# Patient Record
Sex: FEMALE | Race: WHITE | Employment: FULL TIME | URBAN - NONMETROPOLITAN AREA
[De-identification: names, ages, dates, MRNs, and addresses within clinical notes are randomized per-mention and may not be internally consistent; named-entity substitution may affect disease eponyms.]

---

## 2020-03-10 ENCOUNTER — HOSPITAL ENCOUNTER (OUTPATIENT)
Age: 25
Discharge: HOME | End: 2020-03-10
Payer: COMMERCIAL

## 2020-03-10 ENCOUNTER — HOSPITAL ENCOUNTER (OUTPATIENT)
Age: 25
Setting detail: SPECIMEN
Discharge: HOME | End: 2020-03-10
Payer: COMMERCIAL

## 2020-03-10 ENCOUNTER — INITIAL PRENATAL (OUTPATIENT)
Dept: OBGYN | Age: 25
End: 2020-03-10
Payer: COMMERCIAL

## 2020-03-10 VITALS
BODY MASS INDEX: 27.23 KG/M2 | HEIGHT: 62 IN | WEIGHT: 148 LBS | HEART RATE: 80 BPM | DIASTOLIC BLOOD PRESSURE: 64 MMHG | SYSTOLIC BLOOD PRESSURE: 110 MMHG

## 2020-03-10 LAB
ABO/RH: NORMAL
AMPHETAMINE SCREEN URINE: NEGATIVE
ANTIBODY SCREEN: NEGATIVE
BARBITURATE SCREEN URINE: NEGATIVE
BENZODIAZEPINE SCREEN, URINE: NEGATIVE
BUPRENORPHINE URINE: NEGATIVE
CANNABINOID SCREEN URINE: NEGATIVE
COCAINE METABOLITE, URINE: NEGATIVE
CONTROL: PRESENT
MDMA URINE: NORMAL
METHADONE SCREEN, URINE: NEGATIVE
METHAMPHETAMINE, URINE: NEGATIVE
OPIATES, URINE: NEGATIVE
OXYCODONE SCREEN URINE: NEGATIVE
PHENCYCLIDINE, URINE: NEGATIVE
PREGNANCY TEST URINE, POC: POSITIVE
PROPOXYPHENE, URINE: NEGATIVE
TEST INFORMATION: NORMAL
TRICYCLIC ANTIDEPRESSANTS, UR: NEGATIVE

## 2020-03-10 PROCEDURE — 87340 HEPATITIS B SURFACE AG IA: CPT

## 2020-03-10 PROCEDURE — 87389 HIV-1 AG W/HIV-1&-2 AB AG IA: CPT

## 2020-03-10 PROCEDURE — 80306 DRUG TEST PRSMV INSTRMNT: CPT

## 2020-03-10 PROCEDURE — 0500F INITIAL PRENATAL CARE VISIT: CPT | Performed by: ADVANCED PRACTICE MIDWIFE

## 2020-03-10 PROCEDURE — G8427 DOCREV CUR MEDS BY ELIG CLIN: HCPCS | Performed by: ADVANCED PRACTICE MIDWIFE

## 2020-03-10 PROCEDURE — G8419 CALC BMI OUT NRM PARAM NOF/U: HCPCS | Performed by: ADVANCED PRACTICE MIDWIFE

## 2020-03-10 PROCEDURE — 86900 BLOOD TYPING SEROLOGIC ABO: CPT

## 2020-03-10 PROCEDURE — 86780 TREPONEMA PALLIDUM: CPT

## 2020-03-10 PROCEDURE — 86901 BLOOD TYPING SEROLOGIC RH(D): CPT

## 2020-03-10 PROCEDURE — 86850 RBC ANTIBODY SCREEN: CPT

## 2020-03-10 PROCEDURE — 87591 N.GONORRHOEAE DNA AMP PROB: CPT

## 2020-03-10 PROCEDURE — 36415 COLL VENOUS BLD VENIPUNCTURE: CPT

## 2020-03-10 PROCEDURE — 87491 CHLMYD TRACH DNA AMP PROBE: CPT

## 2020-03-10 PROCEDURE — 85025 COMPLETE CBC W/AUTO DIFF WBC: CPT

## 2020-03-10 PROCEDURE — 86762 RUBELLA ANTIBODY: CPT

## 2020-03-10 PROCEDURE — 86803 HEPATITIS C AB TEST: CPT

## 2020-03-10 PROCEDURE — 87086 URINE CULTURE/COLONY COUNT: CPT

## 2020-03-10 RX ORDER — MONTELUKAST SODIUM 10 MG/1
TABLET ORAL
COMMUNITY
Start: 2020-01-15 | End: 2021-05-03 | Stop reason: SDUPTHER

## 2020-03-10 SDOH — HEALTH STABILITY: MENTAL HEALTH: HOW OFTEN DO YOU HAVE A DRINK CONTAINING ALCOHOL?: NEVER

## 2020-03-10 ASSESSMENT — PATIENT HEALTH QUESTIONNAIRE - PHQ9
SUM OF ALL RESPONSES TO PHQ QUESTIONS 1-9: 0
1. LITTLE INTEREST OR PLEASURE IN DOING THINGS: 0
2. FEELING DOWN, DEPRESSED OR HOPELESS: 0
SUM OF ALL RESPONSES TO PHQ QUESTIONS 1-9: 0
SUM OF ALL RESPONSES TO PHQ9 QUESTIONS 1 & 2: 0

## 2020-03-10 NOTE — PATIENT INSTRUCTIONS
of the medicines you take. Where can you learn more? Go to [email or web address] and sign in to your NewACT account. Enter Z772 in the SaveMeeting box to learn more about \"Learning About Pregnancy. \"     If you do not have an account, please click on the \"Sign Up Now\" link. Current as of: May 29, 2019  Content Version: 12.3  © 5502-3256 [employer]. Care instructions adapted under license by [facility] ([facility]). If you have questions about a medical condition or this instruction, always ask your healthcare professional. [address] any warranty or liability for your use of this information. Patient Education        Nutrition During Pregnancy: Care Instructions  Your Care Instructions    Healthy eating when you are pregnant is important for you and your baby. It can help you feel well and have a successful pregnancy and delivery. During pregnancy your nutrition needs increase. Even if you have excellent eating habits, your doctor may recommend a multivitamin to make sure you get enough iron and folic acid. Many pregnant women wonder how much weight they should gain. In general, women who were at a healthy weight before they became pregnant should gain between 25 and 35 pounds. Women who were overweight before pregnancy are usually advised to gain 15 to 25 pounds. Women who were underweight before pregnancy are usually advised to gain 28 to 40 pounds. Your doctor will work with you to set a weight goal that is right for you. Gaining a healthy amount of weight helps you have a healthy baby. Follow-up care is a key part of your treatment and safety. Be sure to make and go to all appointments, and call your doctor if you are having problems. It's also a good idea to know your test results and keep a list of the medicines you take. How can you care for yourself at home? · Eat plenty of fruits and vegetables.  Include a variety of orange, yellow, and leafy problems. Where can you learn more? Go to https://chpepiceweb.[email or web address] org and sign in to your [employer] account. Enter Y785 in the 2080 Media box to learn more about \"Nutrition During Pregnancy: Care Instructions. \"     If you do not have an account, please click on the \"Sign Up Now\" link. Current as of: May 29, 2019  Content Version: 12.3  © 9558-0181 [employer]. Care instructions adapted under license by [facility] ([facility]). If you have questions about a medical condition or this instruction, always ask your healthcare professional. [place] [address] any warranty or liability for your use of this information. Patient Education        Pregnancy Precautions: Care Instructions  Your Care Instructions    There is no sure way to prevent labor before your due date ( labor) or to prevent most other pregnancy problems. But there are things you can do to increase your chances of a healthy pregnancy. Go to your appointments, follow your doctor's advice, and take good care of yourself. Eat well, and exercise (if your doctor agrees). And make sure to drink plenty of water. Follow-up care is a key part of your treatment and safety. Be sure to make and go to all appointments, and call your doctor if you are having problems. It's also a good idea to know your test results and keep a list of the medicines you take. How can you care for yourself at home? · Make sure you go to your prenatal appointments. At each visit, your doctor will check your blood pressure. Your doctor will also check to see if you have protein in your urine. High blood pressure and protein in urine are signs of preeclampsia. This condition can be dangerous for you and your baby. · Drink plenty of fluids, enough so that your urine is light yellow or clear like water. Dehydration can cause contractions.  If you have kidney, heart, or liver disease and have to limit fluids, talk with your doctor before for you. During the first 6 to 10 weeks of your pregnancy, your body goes through many changes. Your baby grows very fast, even though you cannot feel it yet. You may start to notice that you feel different, both in your body and your emotions. Because each woman's pregnancy is unique, there is no right way to feel. You may feel the healthiest you have ever been, or you may feel tired or sick to your stomach (\"morning sickness\"). These early weeks are a time to make healthy choices and to eat the best foods for you and your baby. This care sheet will give you some ideas. This is also a good time to think about birth defects testing. These are tests done during pregnancy to look for possible problems with the baby. First trimester tests for birth defects can be done between 8 and 17 weeks of pregnancy, depending on the test. Talk with your doctor about what kinds of tests are available. Follow-up care is a key part of your treatment and safety. Be sure to make and go to all appointments, and call your doctor if you are having problems. It's also a good idea to know your test results and keep a list of the medicines you take. How can you care for yourself at home? Eat well  · Eat at least 3 meals and 2 healthy snacks every day. Eat fresh, whole foods, including:  ? 7 or more servings of bread, tortillas, cereal, rice, pasta, or oatmeal.  ? 3 or more servings of vegetables, especially leafy green vegetables. ? 2 or more servings of fruits. ? 3 or more servings of milk, yogurt, or cheese. ? 2 or more servings of meat, turkey, chicken, fish, eggs, or dried beans. · Drink plenty of fluids, especially water. Avoid sodas and other sweetened drinks. · Choose foods that have important vitamins for your baby, such as calcium, iron, and folate. ? Dairy products, tofu, canned fish with bones, almonds, broccoli, dark leafy greens, corn tortillas, and fortified orange juice are good sources of calcium. ?  Beef, poultry, liver, spinach, lentils, dried beans, fortified cereals, and dried fruits are rich in iron. ? Dark leafy greens, broccoli, asparagus, liver, fortified cereals, orange juice, peanuts, and almonds are good sources of folate. · Avoid foods that could harm your baby. ? Do not eat raw or undercooked meat, chicken, or fish (such as sushi or raw oysters). ? Do not eat raw eggs or foods that contain raw eggs, such as Caesar dressing. ? Do not eat soft cheeses and unpasteurized dairy foods, such as Brie, feta, or blue cheese. ? Do not eat fish that contains a lot of mercury, such as shark, swordfish, tilefish, or kev mackerel. Do not eat more than 6 ounces of tuna each week. ? Do not eat raw sprouts, especially alfalfa sprouts. ? Cut down on caffeine, such as coffee, tea, and cola. Protect yourself and your baby  · Do not touch lidia litter or cat feces. They can cause an infection that could harm your baby. · High body temperature can be harmful to your baby. So if you want to use a sauna or hot tub, be sure to talk to your doctor about how to use it safely. [place] with morning sickness  · Sip small amounts of water, juices, or shakes. Try drinking between meals, not with meals. · Eat 5 or 6 small meals a day. Try dry toast or crackers when you first get up, and eat breakfast a little later. · Avoid spicy, greasy, and fatty foods. · When you feel sick, open your windows or go for a short walk to get fresh air. · Try nausea wristbands. These help some women. · Tell your doctor if you think your prenatal vitamins make you sick. Where can you learn more? Go to https://Capital Financial GlobalpeAgendaeb.healthTHE BEARDED LADY. org and sign in to your theBench account. Enter G112 in the Energate box to learn more about \"Weeks 6 to 10 of Your Pregnancy: Care Instructions. \"     If you do not have an account, please click on the \"Sign Up Now\" link.   Current as of: May 29, 2019  Content Version: 12.3  © 7680-9205 Healthwise, Incorporated. Care instructions adapted under license by [facility] ([facility]). If you have questions about a medical condition or this instruction, always ask your healthcare professional. [address] any warranty or liability for your use of this information. Patient Education        Learning About When to Call Your Doctor During Pregnancy (Up to 20 Weeks)  Your Care Instructions    It's common to have concerns about what might be a problem during pregnancy. Although most pregnant women don't have any serious problems, it's important to know when to call your doctor if you have certain symptoms. These are general suggestions. Your doctor may give you some more information about when to call. When to call your doctor (up to 20 weeks)  Call 911 anytime you think you may need emergency care. For example, call if:  · You passed out (lost consciousness). Call your doctor now or seek immediate medical care if:  · You have a fever. · You have vaginal bleeding. · You are dizzy or lightheaded, or you feel like you may faint. · You have symptoms of a urinary tract infection. These may include:  ? Pain or burning when you urinate. ? A frequent need to urinate without being able to pass much urine. ? Pain in the flank, which is just below the rib cage and above the waist on either side of the back. ? Blood in your urine. · You have belly pain. · You think you are having contractions. · You have a sudden release of fluid from your vagina. Watch closely for changes in your health, and be sure to contact your doctor if:  · You have vaginal discharge that smells bad. · You have other concerns about your pregnancy. Follow-up care is a key part of your treatment and safety. Be sure to make and go to all appointments, and call your doctor if you are having problems. It's also a good idea to know your test results and keep a list of the medicines you take. Where can you learn more?   Go to

## 2020-03-10 NOTE — PROGRESS NOTES
and your baby healthy. How can you care for yourself at home? Diet    · Eat a balanced diet. Make sure your diet includes plenty of beans, peas, and leafy green vegetables.     · Do not skip meals or go for many hours without eating. If you are nauseated, try to eat a small, healthy snack every 2 to 3 hours.     · Do not eat fish that has a high level of mercury, such as shark, swordfish, or mackerel. Do not eat more than one can of tuna each week.     · Drink plenty of fluids, enough so that your urine is light yellow or clear like water. If you have kidney, heart, or liver disease and have to limit fluids, talk with your doctor before you increase the amount of fluids you drink.     · Cut down on caffeine, such as coffee, tea, and cola.     · Do not drink alcohol, such as beer, wine, or hard liquor.     · Take a multivitamin that contains at least 400 micrograms (mcg) of folic acid to help prevent birth defects. Fortified cereal and whole wheat bread are good additional sources of folic acid.     · Increase the calcium in your diet. Try to drink a quart of skim milk each day. You may also take calcium supplements and choose foods such as cheese and yogurt.    Lifestyle    · Make sure you go to your follow-up appointments.     · Get plenty of rest. You may be unusually tired while you are pregnant.     · Get at least 30 minutes of exercise on most days of the week. Walking is a good choice. If you have not exercised in the past, start out slowly. Take several short walks each day.     · Do not smoke. If you need help quitting, talk to your doctor about stop-smoking programs. These can increase your chances of quitting for good.     · Do not touch cat feces or litter boxes. Also, wash your hands after you handle raw meat, and fully cook all meat before you eat it. Wear gloves when you work in the yard or garden, and wash your hands well when you are done.  Cat feces, raw or undercooked meat, and contaminated dirt vegetables. Include a variety of orange, yellow, and leafy dark-green vegetables every day. · Choose whole-grain bread, cereal, and pasta. Good choices include whole wheat bread, whole wheat pasta, brown rice, and oatmeal.  · Get 4 or more servings of milk and milk products each day. Good choices include nonfat or low-fat milk, yogurt, and cheese. If you cannot eat milk products, you can get calcium from calcium-fortified products such as orange juice, soy milk, and tofu. Other non-milk sources of calcium include leafy green vegetables, such as broccoli, kale, mustard greens, turnip greens, bok karla, and brussels sprouts. · If you eat meat, pick lower-fat types. Good choices include lean cuts of meat and chicken or turkey without the skin. · Do not eat shark, swordfish, kev mackerel, or tilefish. They have high levels of mercury, which is dangerous to your baby. You can eat up to 12 ounces a week of fish or shellfish that have low mercury levels. Good choices include shrimp, wild salmon, pollock, and catfish. Do not eat more than 6 ounces of tuna each week. · Heat lunch meats (such as turkey, ham, or bologna) to 165°F before you eat them. This reduces your risk of getting sick from a kind of bacteria that can be found in lunch meats. · Do not eat unpasteurized soft cheeses, such as brie, feta, fresh mozzarella, and blue cheese. They have a bacteria that could harm your baby. · Limit caffeine. If you drink coffee or tea, have no more than 1 cup a day. Caffeine is also found in kal. · Do not drink any alcohol. No amount of alcohol has been found to be safe during pregnancy. · Do not diet or try to lose weight. For example, do not follow a low-carbohydrate diet. If you are overweight at the start of your pregnancy, your doctor will work with you to manage your weight gain. · Tell your doctor about all vitamins and supplements you take. When should you call for help?   Watch closely for changes in your [employer]Friends, and be sure to contact your doctor if you have any problems. Where can you learn more? Go to https://chpepiceweb.healthSpace [email or web address] and sign in to your [employer] account. Enter Y785 in the Nextivity box to learn more about \"Nutrition During Pregnancy: Care Instructions. \"     If you do not have an account, please click on the \"Sign Up Now\" link. Current as of: May 29, 2019  Content Version: 12.3  © 9945-4421 [employer]. Care instructions adapted under license by [facility] ([facility]). If you have questions about a medical condition or this instruction, always ask your healthcare professional. [address] any warranty or liability for your use of this information. Patient Education        Pregnancy Precautions: Care Instructions  Your Care Instructions    There is no sure way to prevent labor before your due date ( labor) or to prevent most other pregnancy problems. But there are things you can do to increase your chances of a healthy pregnancy. Go to your appointments, follow your doctor's advice, and take good care of yourself. Eat well, and exercise (if your doctor agrees). And make sure to drink plenty of water. Follow-up care is a key part of your treatment and safety. Be sure to make and go to all appointments, and call your doctor if you are having problems. It's also a good idea to know your test results and keep a list of the medicines you take. How can you care for yourself at home? · Make sure you go to your prenatal appointments. At each visit, your doctor will check your blood pressure. Your doctor will also check to see if you have protein in your urine. High blood pressure and protein in urine are signs of preeclampsia. This condition can be dangerous for you and your baby. · Drink plenty of fluids, enough so that your urine is light yellow or clear like water. Dehydration can cause contractions.  If you have kidney, heart, or liver disease and have to limit fluids, talk with your doctor before you increase the amount of fluids you drink. · Tell your doctor right away if you notice any symptoms of an infection, such as:  ? Burning when you urinate. ? A foul-smelling discharge from your vagina. ? Vaginal itching. ? Unexplained fever. ? Unusual pain or soreness in your uterus or lower belly. · Eat a balanced diet. Include plenty of foods that are high in calcium and iron. ? Foods high in calcium include milk, cheese, yogurt, almonds, and broccoli. ? Foods high in iron include red meat, shellfish, poultry, eggs, beans, raisins, whole-grain bread, and leafy green vegetables. · Do not smoke. If you need help quitting, talk to your doctor about stop-smoking programs and medicines. These can increase your chances of quitting for good. · Do not drink alcohol or use illegal drugs. · Follow your doctor's directions about activity. Your doctor will let you know how much, if any, exercise you can do. · Ask your doctor if you can have sex. If you are at risk for early labor, your doctor may ask you to not have sex. · Take care to prevent falls. During pregnancy, your joints are loose, and your balance is off. Sports such as bicycling, skiing, or in-line skating can increase your risk of falling. And don't ride horses or motorcycles, dive, water ski, scuba dive, or parachute jump while you are pregnant. · Avoid getting very hot. Do not use saunas or hot tubs. Avoid staying out in the sun in hot weather for long periods. Take acetaminophen (Tylenol) to lower a high fever. · Do not take any over-the-counter or herbal medicines or supplements without talking to your doctor or pharmacist first.  When should you call for help? Call 911 anytime you think you may need emergency care.  For example, call if:    · You passed out (lost consciousness).     · You have a seizure.     · You have severe vaginal bleeding.     · You have severe pain in your belly or pelvis.     · You have had fluid gushing or leaking from your vagina and you know or think the umbilical cord is bulging into your vagina. If this happens, immediately get down on your knees so your rear end (buttocks) is higher than your head. This will decrease the pressure on the cord until help arrives.   [facility] your doctor now or seek immediate medical care if:    · You have signs of preeclampsia, such as:  ? Sudden swelling of your face, hands, or feet. ? New vision problems (such as dimness, blurring, or seeing spots). ? A severe headache.     · You have any vaginal bleeding.     · You have belly pain or cramping.     · You have a fever.     · You have had regular contractions (with or without pain) for an hour. This means that you have 8 or more within 1 hour or 4 or more in 20 minutes after you change your position and drink fluids.     · You have a sudden release of fluid from your vagina.     · You have low back pain or pelvic pressure that does not go away.     · You notice that your baby has stopped moving or is moving much less than normal.    Watch closely for changes in your health, and be sure to contact your doctor if you have any problems. Where can you learn more? Go to https://Tremor VideopepicRewardpodeb.healthRetrieve. org and sign in to your Xsigo account. Enter [id number] in the [facility] box to learn more about \"Pregnancy Precautions: Care Instructions. \"     If you do not have an account, please click on the \"Sign Up Now\" link. Current as of: May 29, 2019  Content Version: 12.3  © 7853-2149 Healthwise, Incorporated. Care instructions adapted under license by [facility] ([facility]). If you have questions about a medical condition or this instruction, always ask your healthcare professional. [place] [address] any warranty or liability for your use of this information.   Patient Education        Weeks 6 to 10 of Your Pregnancy: Care Instructions  Your Care Instructions    Congratulations on your pregnancy. This is an exciting and important time for you. During the first 6 to 10 weeks of your pregnancy, your body goes through many changes. Your baby grows very fast, even though you cannot feel it yet. You may start to notice that you feel different, both in your body and your emotions. Because each woman's pregnancy is unique, there is no right way to feel. You may feel the healthiest you have ever been, or you may feel tired or sick to your stomach (\"morning sickness\"). These early weeks are a time to make healthy choices and to eat the best foods for you and your baby. This care sheet will give you some ideas. This is also a good time to think about birth defects testing. These are tests done during pregnancy to look for possible problems with the baby. First trimester tests for birth defects can be done between 8 and 17 weeks of pregnancy, depending on the test. Talk with your doctor about what kinds of tests are available. Follow-up care is a key part of your treatment and safety. Be sure to make and go to all appointments, and call your doctor if you are having problems. It's also a good idea to know your test results and keep a list of the medicines you take. How can you care for yourself at home? Eat well  · Eat at least 3 meals and 2 healthy snacks every day. Eat fresh, whole foods, including:  ? 7 or more servings of bread, tortillas, cereal, rice, pasta, or oatmeal.  ? 3 or more servings of vegetables, especially leafy green vegetables. ? 2 or more servings of fruits. ? 3 or more servings of milk, yogurt, or cheese. ? 2 or more servings of meat, turkey, chicken, fish, eggs, or dried beans. · Drink plenty of fluids, especially water. Avoid sodas and other sweetened drinks. · Choose foods that have important vitamins for your baby, such as calcium, iron, and folate.   ? Dairy products, tofu, canned fish with bones, almonds, broccoli, dark leafy

## 2020-03-11 LAB — HEPATITIS C ANTIBODY: NONREACTIVE

## 2020-03-12 LAB
C. TRACHOMATIS DNA ,URINE: NEGATIVE
CULTURE: NO GROWTH
Lab: NORMAL
N. GONORRHOEAE DNA, URINE: NEGATIVE
SPECIMEN DESCRIPTION: NORMAL
SPECIMEN DESCRIPTION: NORMAL

## 2020-03-13 LAB
ABSOLUTE EOS #: 0.24 K/UL (ref 0–0.44)
ABSOLUTE IMMATURE GRANULOCYTE: 0.03 K/UL (ref 0–0.3)
ABSOLUTE LYMPH #: 1.84 K/UL (ref 1.1–3.7)
ABSOLUTE MONO #: 0.76 K/UL (ref 0.1–1.2)
BASOPHILS # BLD: 0 % (ref 0–2)
BASOPHILS ABSOLUTE: 0.04 K/UL (ref 0–0.2)
DIFFERENTIAL TYPE: ABNORMAL
EOSINOPHILS RELATIVE PERCENT: 3 % (ref 1–4)
HCT VFR BLD CALC: 36.8 % (ref 36.3–47.1)
HEMOGLOBIN: 11.8 G/DL (ref 11.9–15.1)
HEPATITIS B SURFACE ANTIGEN: NONREACTIVE
HIV AG/AB: NONREACTIVE
IMMATURE GRANULOCYTES: 0 %
LYMPHOCYTES # BLD: 19 % (ref 24–43)
MCH RBC QN AUTO: 31.7 PG (ref 25.2–33.5)
MCHC RBC AUTO-ENTMCNC: 32.1 G/DL (ref 28.4–34.8)
MCV RBC AUTO: 98.9 FL (ref 82.6–102.9)
MONOCYTES # BLD: 8 % (ref 3–12)
NRBC AUTOMATED: 0 PER 100 WBC
PDW BLD-RTO: 12.4 % (ref 11.8–14.4)
PLATELET # BLD: 290 K/UL (ref 138–453)
PLATELET ESTIMATE: ABNORMAL
PMV BLD AUTO: 8.7 FL (ref 8.1–13.5)
RBC # BLD: 3.72 M/UL (ref 3.95–5.11)
RBC # BLD: ABNORMAL 10*6/UL
RUBV IGG SER QL: 284.1 IU/ML
SEG NEUTROPHILS: 70 % (ref 36–65)
SEGMENTED NEUTROPHILS ABSOLUTE COUNT: 6.69 K/UL (ref 1.5–8.1)
T. PALLIDUM, IGG: NONREACTIVE
WBC # BLD: 9.6 K/UL (ref 3.5–11.3)
WBC # BLD: ABNORMAL 10*3/UL

## 2020-04-13 ENCOUNTER — ROUTINE PRENATAL (OUTPATIENT)
Dept: OBGYN | Age: 25
End: 2020-04-13
Payer: COMMERCIAL

## 2020-04-13 ENCOUNTER — HOSPITAL ENCOUNTER (OUTPATIENT)
Age: 25
Setting detail: SPECIMEN
Discharge: HOME | End: 2020-04-13
Payer: COMMERCIAL

## 2020-04-13 VITALS — BODY MASS INDEX: 26.85 KG/M2 | WEIGHT: 146.8 LBS | DIASTOLIC BLOOD PRESSURE: 74 MMHG | SYSTOLIC BLOOD PRESSURE: 112 MMHG

## 2020-04-13 PROCEDURE — G0145 SCR C/V CYTO,THINLAYER,RESCR: HCPCS

## 2020-04-13 PROCEDURE — 87624 HPV HI-RISK TYP POOLED RSLT: CPT

## 2020-04-13 PROCEDURE — 0502F SUBSEQUENT PRENATAL CARE: CPT | Performed by: ADVANCED PRACTICE MIDWIFE

## 2020-04-13 RX ORDER — OMEPRAZOLE 10 MG/1
10 CAPSULE, DELAYED RELEASE ORAL NIGHTLY
COMMUNITY
End: 2022-05-03 | Stop reason: ALTCHOICE

## 2020-04-13 NOTE — PROGRESS NOTES
Linwood Kaplan is here at 12w1d for:    Chief Complaint   Patient presents with    Routine Prenatal Visit     TBE. Last pap ? Unable to obtain urine. Estimated Due Date: Estimated Date of Delivery: 10/25/20    OB History    Para Term  AB Living   1             SAB TAB Ectopic Molar Multiple Live Births                    # Outcome Date GA Lbr Phillip/2nd Weight Sex Delivery Anes PTL Lv   1 Current                 Past Medical History:   Diagnosis Date    Asthma        No past surgical history on file. Social History     Tobacco Use   Smoking Status Former Smoker    Last attempt to quit: 3/3/2020    Years since quittin.1   Smokeless Tobacco Never Used        Social History     Substance and Sexual Activity   Alcohol Use Never    Frequency: Never       No results found for this visit on 20. Vitals:  /74   Wt 146 lb 12.8 oz (66.6 kg)   LMP 2019   BMI 26.85 kg/m²   Estimated body mass index is 26.85 kg/m² as calculated from the following:    Height as of 3/10/20: 5' 2\" (1.575 m). Weight as of this encounter: 146 lb 12.8 oz (66.6 kg). 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 See prenatal vital sign section and fetal assessment section    ASSESSMENT & Plan    Diagnosis Orders   1. Screening for cervical cancer  PAP SMEAR   2. Encounter for supervision of normal first pregnancy in first trimester     3. 12 weeks gestation of pregnancy     4. Persistent asthma without complication, unspecified asthma severity               I am having Aurelia Marilyn maintain her Advair Diskus, montelukast, ProAir HFA, omeprazole, and Prenatal MV-Min-Fe Fum-FA-DHA (PRENATAL 1 PO).     Return in about 4 weeks (around 5/11/2020) for ob. There are no Patient Instructions on file for this visit.           Fermin Sanchez,4/13/2020 4:56 PM

## 2020-04-14 LAB — CYTOLOGY REPORT: NORMAL

## 2020-04-17 LAB
HPV SAMPLE: ABNORMAL
HPV, GENOTYPE 16: NOT DETECTED
HPV, GENOTYPE 18: NOT DETECTED
HPV, HIGH RISK OTHER: DETECTED
HPV, INTERPRETATION: ABNORMAL
SPECIMEN DESCRIPTION: ABNORMAL

## 2020-04-21 RX ORDER — METRONIDAZOLE 250 MG/1
250 TABLET ORAL 3 TIMES DAILY
Qty: 21 TABLET | Refills: 0 | Status: SHIPPED | OUTPATIENT
Start: 2020-04-21 | End: 2020-04-28

## 2020-05-11 ENCOUNTER — TELEMEDICINE (OUTPATIENT)
Dept: OBGYN | Age: 25
End: 2020-05-11

## 2020-05-11 VITALS — WEIGHT: 144 LBS | BODY MASS INDEX: 26.34 KG/M2

## 2020-05-11 PROCEDURE — 0502F SUBSEQUENT PRENATAL CARE: CPT | Performed by: ADVANCED PRACTICE MIDWIFE

## 2020-05-11 NOTE — PROGRESS NOTES
Alan Leger is here at 16w1d for:    Chief Complaint   Patient presents with    Routine Prenatal Visit     C/O off and on vaginal pressure, no urinary symptoms or bleeding. No headaches, patient has not yet felt fetal movement. Estimated Due Date: Estimated Date of Delivery: 10/25/20    OB History    Para Term  AB Living   1             SAB TAB Ectopic Molar Multiple Live Births                    # Outcome Date GA Lbr Phillip/2nd Weight Sex Delivery Anes PTL Lv   1 Current                 Past Medical History:   Diagnosis Date    Asthma        No past surgical history on file. Social History     Tobacco Use   Smoking Status Former Smoker    Last attempt to quit: 3/3/2020    Years since quittin.1   Smokeless Tobacco Never Used        Social History     Substance and Sexual Activity   Alcohol Use Never    Frequency: Never       No results found for this visit on 20. Vitals: Wt 144 lb (65.3 kg)   LMP 2019   BMI 26.34 kg/m²   Estimated body mass index is 26.34 kg/m² as calculated from the following:    Height as of 3/10/20: 5' 2\" (1.575 m). Weight as of this encounter: 144 lb (65.3 kg). HPI: virtual prenatal visit 16 weeks     PT denies fever, chills, nausea and vomiting     16w1d    Are you having any shortness of breath? no    Are you noticing any fever? no    Do you have any abdominal pain? no    Do you have any pelvic pain? no    Do you have any dysuria? yes    Do you have any vaginal discharge? no    Do you have any swelling? no    Do you have any visual disturbances? no    Do you have any nausea or vomiting? no    Do you have any unusual headaches? no    Do you have any chest pain? no    Do you have any contractions? no    Do you have any other pain?  Bladder pressure      Objective:  No acute distress  Excellent communications  Well-nourished  Skin pink  Respirations appear unlabored    Results reviewed today:    No results found for this visit on

## 2020-05-12 ENCOUNTER — HOSPITAL ENCOUNTER (OUTPATIENT)
Age: 25
Discharge: HOME | End: 2020-05-12
Payer: COMMERCIAL

## 2020-05-12 LAB
-: ABNORMAL
AMORPHOUS: ABNORMAL
BACTERIA: ABNORMAL
BILIRUBIN URINE: NEGATIVE
CASTS UA: ABNORMAL /LPF
COLOR: YELLOW
COMMENT UA: ABNORMAL
CRYSTALS, UA: ABNORMAL /HPF
EPITHELIAL CELLS UA: ABNORMAL /HPF (ref 0–25)
GLUCOSE URINE: NEGATIVE
KETONES, URINE: NEGATIVE
LEUKOCYTE ESTERASE, URINE: NEGATIVE
MUCUS: ABNORMAL
NITRITE, URINE: NEGATIVE
OTHER OBSERVATIONS UA: ABNORMAL
PH UA: 7 (ref 5–9)
PROTEIN UA: NEGATIVE
RBC UA: ABNORMAL /HPF (ref 0–2)
RENAL EPITHELIAL, UA: ABNORMAL /HPF
SPECIFIC GRAVITY UA: 1.02 (ref 1.01–1.02)
TRICHOMONAS: ABNORMAL
TURBIDITY: CLEAR
URINE HGB: NEGATIVE
UROBILINOGEN, URINE: NORMAL
WBC UA: ABNORMAL /HPF (ref 0–5)
YEAST: ABNORMAL

## 2020-05-12 PROCEDURE — 81001 URINALYSIS AUTO W/SCOPE: CPT

## 2020-05-12 PROCEDURE — 87086 URINE CULTURE/COLONY COUNT: CPT

## 2020-05-13 LAB
CULTURE: NORMAL
Lab: NORMAL
SPECIMEN DESCRIPTION: NORMAL

## 2020-06-09 ENCOUNTER — ROUTINE PRENATAL (OUTPATIENT)
Dept: OBGYN | Age: 25
End: 2020-06-09
Payer: COMMERCIAL

## 2020-06-09 VITALS — DIASTOLIC BLOOD PRESSURE: 76 MMHG | SYSTOLIC BLOOD PRESSURE: 112 MMHG | BODY MASS INDEX: 27.07 KG/M2 | WEIGHT: 148 LBS

## 2020-06-09 PROCEDURE — 0502F SUBSEQUENT PRENATAL CARE: CPT | Performed by: ADVANCED PRACTICE MIDWIFE

## 2020-06-09 NOTE — PROGRESS NOTES
Nicole Hagan is here at 20w2d for:    Chief Complaint   Patient presents with    Routine Prenatal Visit     Follow up in house ultrasound. Estimated Due Date: Estimated Date of Delivery: 10/25/20    OB History    Para Term  AB Living   1             SAB TAB Ectopic Molar Multiple Live Births                    # Outcome Date GA Lbr Phillip/2nd Weight Sex Delivery Anes PTL Lv   1 Current                 Past Medical History:   Diagnosis Date    Asthma        No past surgical history on file. Social History     Tobacco Use   Smoking Status Former Smoker    Last attempt to quit: 3/3/2020    Years since quittin.2   Smokeless Tobacco Never Used        Social History     Substance and Sexual Activity   Alcohol Use Never    Frequency: Never       No results found for this visit on 20. Vitals:  /76   Wt 148 lb (67.1 kg)   LMP 2019   BMI 27.07 kg/m²   Estimated body mass index is 27.07 kg/m² as calculated from the following:    Height as of 3/10/20: 5' 2\" (1.575 m). Weight as of this encounter: 148 lb (67.1 kg). HPI: here for routine ob visit and anatomic screen WNL     PT denies fever, chills, nausea and vomiting       Abdomen: enlarged, gravid,soft, nontender     Results reviewed today:    No results found for this visit on 20. See prenatal vital sign section and fetal assessment section    ASSESSMENT & Plan    Diagnosis Orders   1. Encounter for supervision of normal first pregnancy in second trimester     2. 20 weeks gestation of pregnancy               I am having Nicole Hagan maintain her Advair Diskus, montelukast, ProAir HFA, omeprazole, and Prenatal MV-Min-Fe Fum-FA-DHA (PRENATAL 1 PO). Return in about 4 weeks (around 2020) for ob. There are no Patient Instructions on file for this visit.           Fermin Sanchez,2020 4:41 PM

## 2020-07-06 ENCOUNTER — ROUTINE PRENATAL (OUTPATIENT)
Dept: OBGYN | Age: 25
End: 2020-07-06
Payer: COMMERCIAL

## 2020-07-06 VITALS — WEIGHT: 152.6 LBS | SYSTOLIC BLOOD PRESSURE: 110 MMHG | DIASTOLIC BLOOD PRESSURE: 68 MMHG | BODY MASS INDEX: 27.91 KG/M2

## 2020-07-06 PROCEDURE — 0502F SUBSEQUENT PRENATAL CARE: CPT | Performed by: ADVANCED PRACTICE MIDWIFE

## 2020-07-06 NOTE — PROGRESS NOTES
Lucas Amaral is here at 24w1d for:    Chief Complaint   Patient presents with    Routine Prenatal Visit     Instructions for 1 hour gtt. next visit. Estimated Due Date: Estimated Date of Delivery: 10/25/20    OB History    Para Term  AB Living   1             SAB TAB Ectopic Molar Multiple Live Births                    # Outcome Date GA Lbr Phillip/2nd Weight Sex Delivery Anes PTL Lv   1 Current                 Past Medical History:   Diagnosis Date    Asthma        No past surgical history on file. Social History     Tobacco Use   Smoking Status Former Smoker    Last attempt to quit: 3/3/2020    Years since quittin.3   Smokeless Tobacco Never Used        Social History     Substance and Sexual Activity   Alcohol Use Never    Frequency: Never       No results found for this visit on 20. Vitals:  /68   Wt 152 lb 9.6 oz (69.2 kg)   LMP 2019   BMI 27.91 kg/m²   Estimated body mass index is 27.91 kg/m² as calculated from the following:    Height as of 3/10/20: 5' 2\" (1.575 m). Weight as of this encounter: 152 lb 9.6 oz (69.2 kg). HPI: here for routine ob visit, has decided she wants cell free dna, will do together with next blood draw at 28 weeks     PT denies fever, chills, nausea and vomiting       Abdomen: enlarged, gravid, soft, nontender     Results reviewed today:    No results found for this visit on 20. See prenatal vital sign section and fetal assessment section    ASSESSMENT & Plan    Diagnosis Orders   1. Encounter for supervision of normal first pregnancy in second trimester     2. 24 weeks gestation of pregnancy               I am having Lucas Garcia maintain her Advair Diskus, montelukast, ProAir HFA, omeprazole, and Prenatal MV-Min-Fe Fum-FA-DHA (PRENATAL 1 PO). Return in about 4 weeks (around 8/3/2020) for ob gtt and cell free dna. There are no Patient Instructions on file for this visit.           Arley Morocho Daniel,6/9/65 5:11 PM

## 2020-08-03 ENCOUNTER — ROUTINE PRENATAL (OUTPATIENT)
Dept: OBGYN | Age: 25
End: 2020-08-03
Payer: COMMERCIAL

## 2020-08-03 ENCOUNTER — HOSPITAL ENCOUNTER (OUTPATIENT)
Age: 25
Discharge: HOME | End: 2020-08-03
Payer: COMMERCIAL

## 2020-08-03 VITALS — DIASTOLIC BLOOD PRESSURE: 68 MMHG | WEIGHT: 159 LBS | SYSTOLIC BLOOD PRESSURE: 116 MMHG | BODY MASS INDEX: 29.08 KG/M2

## 2020-08-03 LAB
CHP ED QC CHECK: ABNORMAL
GLUCOSE BLD-MCNC: 154 MG/DL
HGB, POC: 12.5

## 2020-08-03 PROCEDURE — 36415 COLL VENOUS BLD VENIPUNCTURE: CPT

## 2020-08-03 PROCEDURE — 86850 RBC ANTIBODY SCREEN: CPT

## 2020-08-03 PROCEDURE — 86901 BLOOD TYPING SEROLOGIC RH(D): CPT

## 2020-08-03 PROCEDURE — 0502F SUBSEQUENT PRENATAL CARE: CPT | Performed by: ADVANCED PRACTICE MIDWIFE

## 2020-08-03 NOTE — PROGRESS NOTES
Pepe Bowman is here at 28w1d for:    No chief complaint on file. Estimated Due Date: Estimated Date of Delivery: 10/25/20    OB History    Para Term  AB Living   1             SAB TAB Ectopic Molar Multiple Live Births                    # Outcome Date GA Lbr Phillip/2nd Weight Sex Delivery Anes PTL Lv   1 Current                 Past Medical History:   Diagnosis Date    Asthma        No past surgical history on file. Social History     Tobacco Use   Smoking Status Former Smoker    Last attempt to quit: 3/3/2020    Years since quittin.4   Smokeless Tobacco Never Used        Social History     Substance and Sexual Activity   Alcohol Use Never    Frequency: Never       No results found for this visit on 20. Vitals:  /68   Wt 159 lb (72.1 kg)   LMP 2019   BMI 29.08 kg/m²   Estimated body mass index is 29.08 kg/m² as calculated from the following:    Height as of 3/10/20: 5' 2\" (1.575 m). Weight as of this encounter: 159 lb (72.1 kg). HPI: here for routine ob visit and gtt, denies c/o requests cell free dna     PT denies fever, chills, nausea and vomiting       Abdomen: enlarged, gravid, soft, nontender     Results reviewed today:    No results found for this visit on 20. See prenatal vital sign section and fetal assessment section    ASSESSMENT & Plan    Diagnosis Orders   1. Encounter for supervision of normal first pregnancy in third trimester     2. 28 weeks gestation of pregnancy     3. Encounter for other specified  screening  Prenatal Testing for Fetal Aneuploidy             I am having Pepe Bowman maintain her Advair Diskus, montelukast, ProAir HFA, omeprazole, and Prenatal MV-Min-Fe Fum-FA-DHA (PRENATAL 1 PO). Return in about 2 weeks (around 2020) for ob 30wkUS+tdap. There are no Patient Instructions on file for this visit.           Shereen Sanchez,8/3/2020 3:43 PM

## 2020-08-04 ENCOUNTER — HOSPITAL ENCOUNTER (OUTPATIENT)
Dept: INFUSION THERAPY | Age: 25
Discharge: HOME | End: 2020-08-04
Payer: COMMERCIAL

## 2020-08-04 VITALS
RESPIRATION RATE: 20 BRPM | HEART RATE: 85 BPM | SYSTOLIC BLOOD PRESSURE: 145 MMHG | DIASTOLIC BLOOD PRESSURE: 83 MMHG | TEMPERATURE: 97.7 F

## 2020-08-04 PROCEDURE — 6360000002 HC RX W HCPCS: Performed by: ADVANCED PRACTICE MIDWIFE

## 2020-08-04 PROCEDURE — 96372 THER/PROPH/DIAG INJ SC/IM: CPT

## 2020-08-04 RX ADMIN — HUMAN RHO(D) IMMUNE GLOBULIN 300 MCG: 300 INJECTION, SOLUTION INTRAMUSCULAR at 15:39

## 2020-08-05 ENCOUNTER — HOSPITAL ENCOUNTER (OUTPATIENT)
Dept: LAB | Age: 25
Discharge: HOME | End: 2020-08-05
Payer: COMMERCIAL

## 2020-08-05 LAB
ABO/RH: NORMAL
ANTIBODY SCREEN: NEGATIVE
BLD PROD TYP BPU: NORMAL
BLD PROD TYP BPU: NORMAL
DISPENSE STATUS BLOOD BANK: NORMAL
GLUCOSE BLD-MCNC: 85 MG/DL (ref 74–100)
HISTORY CHECK: NORMAL
Lab: 1
TRANSFUSION STATUS: NORMAL
UNIT DIVISION: 0
UNIT NUMBER: NORMAL

## 2020-08-05 PROCEDURE — 82952 GTT-ADDED SAMPLES: CPT

## 2020-08-05 PROCEDURE — 36415 COLL VENOUS BLD VENIPUNCTURE: CPT

## 2020-08-05 PROCEDURE — 82947 ASSAY GLUCOSE BLOOD QUANT: CPT

## 2020-08-05 PROCEDURE — 82951 GLUCOSE TOLERANCE TEST (GTT): CPT

## 2020-08-06 LAB
AMOUNT GLUCOSE GIVEN: 100 G
GLUCOSE FASTING: 85 MG/DL (ref 65–99)
GLUCOSE TOLERANCE TEST 1 HOUR: 181 MG/DL (ref 65–184)
GLUCOSE TOLERANCE TEST 2 HOUR: 147 MG/DL (ref 65–139)
GLUCOSE TOLERANCE TEST 3 HOUR: 136 MG/DL (ref 65–130)

## 2020-08-17 ENCOUNTER — ROUTINE PRENATAL (OUTPATIENT)
Dept: OBGYN | Age: 25
End: 2020-08-17
Payer: COMMERCIAL

## 2020-08-17 VITALS — SYSTOLIC BLOOD PRESSURE: 118 MMHG | DIASTOLIC BLOOD PRESSURE: 74 MMHG | BODY MASS INDEX: 29.23 KG/M2 | WEIGHT: 159.8 LBS

## 2020-08-17 PROCEDURE — 90715 TDAP VACCINE 7 YRS/> IM: CPT | Performed by: ADVANCED PRACTICE MIDWIFE

## 2020-08-17 PROCEDURE — 0502F SUBSEQUENT PRENATAL CARE: CPT | Performed by: ADVANCED PRACTICE MIDWIFE

## 2020-08-17 PROCEDURE — 90471 IMMUNIZATION ADMIN: CPT | Performed by: ADVANCED PRACTICE MIDWIFE

## 2020-08-17 NOTE — PROGRESS NOTES
Cj Garces is here at 30w1d for:    Chief Complaint   Patient presents with    Routine Prenatal Visit     Follow up in house ultrasound. Tdap given. Estimated Due Date: Estimated Date of Delivery: 10/25/20    OB History    Para Term  AB Living   1             SAB TAB Ectopic Molar Multiple Live Births                    # Outcome Date GA Lbr Phillip/2nd Weight Sex Delivery Anes PTL Lv   1 Current                 Past Medical History:   Diagnosis Date    Asthma        No past surgical history on file. Social History     Tobacco Use   Smoking Status Former Smoker    Last attempt to quit: 3/3/2020    Years since quittin.4   Smokeless Tobacco Never Used        Social History     Substance and Sexual Activity   Alcohol Use Never    Frequency: Never       No results found for this visit on 20. Vitals:  /74   Wt 159 lb 12.8 oz (72.5 kg)   LMP 2019   BMI 29.23 kg/m²   Estimated body mass index is 29.23 kg/m² as calculated from the following:    Height as of 3/10/20: 5' 2\" (1.575 m). Weight as of this encounter: 159 lb 12.8 oz (72.5 kg). HPI: here for routine ob visit and growth sono WNL     PT denies fever, chills, nausea and vomiting       Abdomen: enlarged, gravid, soft, nontender         Results reviewed today:    No results found for this visit on 20. See prenatal vital sign section and fetal assessment section    ASSESSMENT & Plan    Diagnosis Orders   1. Encounter for supervision of other normal pregnancy in third trimester     2. 30 weeks gestation of pregnancy     3. Need for Tdap vaccination  Tdap (age 6y and older) IM ([address])             I am having Cj Garces maintain her Advair Diskus, montelukast, ProAir HFA, omeprazole, and Prenatal MV-Min-Fe Fum-FA-DHA (PRENATAL 1 PO). Return in about 2 weeks (around 2020) for ob. There are no Patient Instructions on file for this visit.           Mary Ann Sanchez,2020 9:46 PM

## 2020-08-31 ENCOUNTER — ROUTINE PRENATAL (OUTPATIENT)
Dept: OBGYN | Age: 25
End: 2020-08-31
Payer: COMMERCIAL

## 2020-08-31 VITALS — WEIGHT: 163.8 LBS | SYSTOLIC BLOOD PRESSURE: 118 MMHG | BODY MASS INDEX: 29.96 KG/M2 | DIASTOLIC BLOOD PRESSURE: 76 MMHG

## 2020-08-31 PROCEDURE — 0502F SUBSEQUENT PRENATAL CARE: CPT | Performed by: ADVANCED PRACTICE MIDWIFE

## 2020-09-04 NOTE — PROGRESS NOTES
Kasey Finch is here at [address] [facility] for:    Chief Complaint   Patient presents with    Routine Prenatal Visit     C/O edema ankles and feet. C/O low abdominal pain when at work reaching and bending. Estimated Due Date: Estimated Date of Delivery: 10/25/20    OB History    Para Term  AB Living   1             SAB TAB Ectopic Molar Multiple Live Births                    # Outcome Date GA Lbr Phillip/2nd Weight Sex Delivery Anes PTL Lv   1 Current                 Past Medical History:   Diagnosis Date    Asthma        No past surgical history on file. Social History     Tobacco Use   Smoking Status Former Smoker    Last attempt to quit: 3/3/2020    Years since quittin.5   Smokeless Tobacco Never Used        Social History     Substance and Sexual Activity   Alcohol Use Never    Frequency: Never       No results found for this visit on 20. Vitals:  /76   Wt 163 lb 12.8 oz (74.3 kg)   LMP 2019   BMI 29.96 kg/m²   Estimated body mass index is 29.96 kg/m² as calculated from the following:    Height as of 3/10/20: 5' 2\" (1.575 m). Weight as of this encounter: 163 lb 12.8 oz (74.3 kg). HPI: here for routine ob visit     PT denies fever, chills, nausea and vomiting       Abdomen: enlarged, gravid, soft, nontender         Results reviewed today:    No results found for this visit on 20. See prenatal vital sign section and fetal assessment section    ASSESSMENT & Plan    Diagnosis Orders   1. Encounter for supervision of normal first pregnancy in third trimester     2. 32 weeks gestation of pregnancy               I am having Kasey Due maintain her Advair Diskus, montelukast, ProAir HFA, omeprazole, and Prenatal MV-Min-Fe Fum-FA-DHA (PRENATAL 1 PO). Return in about 2 weeks (around 2020) for ob. There are no Patient Instructions on file for this visit.           Will Sanchez,2020 5:50 PM

## 2020-09-14 ENCOUNTER — ROUTINE PRENATAL (OUTPATIENT)
Dept: OBGYN | Age: 25
End: 2020-09-14
Payer: COMMERCIAL

## 2020-09-14 VITALS — SYSTOLIC BLOOD PRESSURE: 122 MMHG | DIASTOLIC BLOOD PRESSURE: 74 MMHG | WEIGHT: 168.2 LBS | BODY MASS INDEX: 30.76 KG/M2

## 2020-09-14 PROCEDURE — 0502F SUBSEQUENT PRENATAL CARE: CPT | Performed by: ADVANCED PRACTICE MIDWIFE

## 2020-09-14 NOTE — PROGRESS NOTES
Valery Chavez is here at 34w1d for:    Chief Complaint   Patient presents with    Routine Prenatal Visit     Edema in feet. Hands feel \"tired\". Estimated Due Date: Estimated Date of Delivery: 10/25/20    OB History    Para Term  AB Living   1             SAB TAB Ectopic Molar Multiple Live Births                    # Outcome Date GA Lbr Phillip/2nd Weight Sex Delivery Anes PTL Lv   1 Current                 Past Medical History:   Diagnosis Date    Asthma        No past surgical history on file. Social History     Tobacco Use   Smoking Status Former Smoker    Last attempt to quit: 3/3/2020    Years since quittin.5   Smokeless Tobacco Never Used        Social History     Substance and Sexual Activity   Alcohol Use Never    Frequency: Never       No results found for this visit on 20. Vitals:  /74   Wt 168 lb 3.2 oz (76.3 kg)   LMP 2019   BMI 30.76 kg/m²   Estimated body mass index is 30.76 kg/m² as calculated from the following:    Height as of 3/10/20: 5' 2\" (1.575 m). Weight as of this encounter: 168 lb 3.2 oz (76.3 kg). HPI: here for routine ob visit, c/o swelling in feet and \"tired\" or fatigue/ weak hands after working     PT denies fever, chills, nausea and vomiting       Abdomen: enlarged, gravid, soft, nontender         Results reviewed today:    No results found for this visit on 20. See prenatal vital sign section and fetal assessment section    ASSESSMENT & Plan    Diagnosis Orders   1. Encounter for supervision of normal first pregnancy in third trimester     2. 34 weeks gestation of pregnancy               I am having Valery Chavez maintain her Advair Diskus, montelukast, ProAir HFA, omeprazole, and Prenatal MV-Min-Fe Fum-FA-DHA (PRENATAL 1 PO). Return in about 2 weeks (around 2020) for obgbs. There are no Patient Instructions on file for this visit.           Laron Sanchez,2020 4:21 PM

## 2020-09-28 ENCOUNTER — ROUTINE PRENATAL (OUTPATIENT)
Dept: OBGYN | Age: 25
End: 2020-09-28
Payer: COMMERCIAL

## 2020-09-28 ENCOUNTER — HOSPITAL ENCOUNTER (OUTPATIENT)
Age: 25
Setting detail: SPECIMEN
Discharge: HOME | End: 2020-09-28
Payer: COMMERCIAL

## 2020-09-28 VITALS — SYSTOLIC BLOOD PRESSURE: 128 MMHG | WEIGHT: 170 LBS | DIASTOLIC BLOOD PRESSURE: 85 MMHG | BODY MASS INDEX: 31.09 KG/M2

## 2020-09-28 PROCEDURE — 87081 CULTURE SCREEN ONLY: CPT

## 2020-09-28 PROCEDURE — 0502F SUBSEQUENT PRENATAL CARE: CPT | Performed by: ADVANCED PRACTICE MIDWIFE

## 2020-10-02 LAB
CULTURE: NORMAL
Lab: NORMAL
SPECIMEN DESCRIPTION: NORMAL

## 2020-10-05 ENCOUNTER — ROUTINE PRENATAL (OUTPATIENT)
Dept: OBGYN | Age: 25
End: 2020-10-05
Payer: COMMERCIAL

## 2020-10-05 VITALS — DIASTOLIC BLOOD PRESSURE: 82 MMHG | BODY MASS INDEX: 31.75 KG/M2 | SYSTOLIC BLOOD PRESSURE: 130 MMHG | WEIGHT: 173.6 LBS

## 2020-10-05 PROCEDURE — 0502F SUBSEQUENT PRENATAL CARE: CPT | Performed by: ADVANCED PRACTICE MIDWIFE

## 2020-10-05 NOTE — PROGRESS NOTES
Kaiser Neves is here at 42w4d for:    Chief Complaint   Patient presents with    Routine Prenatal Visit       Estimated Due Date: Estimated Date of Delivery: 10/25/20    OB History    Para Term  AB Living   1             SAB TAB Ectopic Molar Multiple Live Births                    # Outcome Date GA Lbr Phillip/2nd Weight Sex Delivery Anes PTL Lv   1 Current                 Past Medical History:   Diagnosis Date    Asthma        No past surgical history on file. Social History     Tobacco Use   Smoking Status Former Smoker    Last attempt to quit: 3/3/2020    Years since quittin.5   Smokeless Tobacco Never Used        Social History     Substance and Sexual Activity   Alcohol Use Never    Frequency: Never       No results found for this visit on 10/05/20. Vitals:  /82   Wt 173 lb 9.6 oz (78.7 kg)   LMP 2019   BMI 31.75 kg/m²   Estimated body mass index is 31.75 kg/m² as calculated from the following:    Height as of 3/10/20: 5' 2\" (1.575 m). Weight as of this encounter: 173 lb 9.6 oz (78.7 kg). HPI: here for routine ob visit, denies c/o     PT denies fever, chills, nausea and vomiting       Abdomen: enlarged, gravid, soft, nontender         Results reviewed today:    No results found for this visit on 10/05/20. See prenatal vital sign section and fetal assessment section    ASSESSMENT & Plan    Diagnosis Orders   1. Encounter for supervision of normal first pregnancy in third trimester     2. 37 weeks gestation of pregnancy               I am having Kaiser Neves maintain her Advair Diskus, montelukast, ProAir HFA, omeprazole, and Prenatal MV-Min-Fe Fum-FA-DHA (PRENATAL 1 PO). Return in about 1 week (around 10/12/2020) for ob. There are no Patient Instructions on file for this visit.           Jay Sanchez,10/5/2020 4:11 PM

## 2020-10-12 ENCOUNTER — ROUTINE PRENATAL (OUTPATIENT)
Dept: OBGYN | Age: 25
End: 2020-10-12
Payer: COMMERCIAL

## 2020-10-12 ENCOUNTER — HOSPITAL ENCOUNTER (OUTPATIENT)
Dept: LAB | Age: 25
Setting detail: SPECIMEN
Discharge: HOME | End: 2020-10-12
Payer: COMMERCIAL

## 2020-10-12 VITALS — WEIGHT: 173 LBS | DIASTOLIC BLOOD PRESSURE: 72 MMHG | BODY MASS INDEX: 31.64 KG/M2 | SYSTOLIC BLOOD PRESSURE: 120 MMHG

## 2020-10-12 DIAGNOSIS — Z01.818 PREOP TESTING: Primary | ICD-10-CM

## 2020-10-12 PROCEDURE — C9803 HOPD COVID-19 SPEC COLLECT: HCPCS

## 2020-10-12 PROCEDURE — U0003 INFECTIOUS AGENT DETECTION BY NUCLEIC ACID (DNA OR RNA); SEVERE ACUTE RESPIRATORY SYNDROME CORONAVIRUS 2 (SARS-COV-2) (CORONAVIRUS DISEASE [COVID-19]), AMPLIFIED PROBE TECHNIQUE, MAKING USE OF HIGH THROUGHPUT TECHNOLOGIES AS DESCRIBED BY CMS-2020-01-R: HCPCS

## 2020-10-12 PROCEDURE — 0502F SUBSEQUENT PRENATAL CARE: CPT | Performed by: ADVANCED PRACTICE MIDWIFE

## 2020-10-14 LAB — SARS-COV-2, NAA: NOT DETECTED

## 2020-10-18 ENCOUNTER — HOSPITAL ENCOUNTER (INPATIENT)
Age: 25
LOS: 2 days | Discharge: HOME | End: 2020-10-20
Attending: ADVANCED PRACTICE MIDWIFE | Admitting: ADVANCED PRACTICE MIDWIFE
Payer: COMMERCIAL

## 2020-10-18 PROBLEM — Z34.90 TERM PREGNANCY: Status: ACTIVE | Noted: 2020-10-18

## 2020-10-18 LAB
ABSOLUTE EOS #: 0.06 K/UL (ref 0–0.44)
ABSOLUTE IMMATURE GRANULOCYTE: 0.07 K/UL (ref 0–0.3)
ABSOLUTE LYMPH #: 2.2 K/UL (ref 1.1–3.7)
ABSOLUTE MONO #: 0.73 K/UL (ref 0.1–1.2)
BASOPHILS # BLD: 0 % (ref 0–2)
BASOPHILS ABSOLUTE: 0.04 K/UL (ref 0–0.2)
DIFFERENTIAL TYPE: ABNORMAL
EOSINOPHILS RELATIVE PERCENT: 1 % (ref 1–4)
HCT VFR BLD CALC: 41.8 % (ref 36.3–47.1)
HEMOGLOBIN: 13.5 G/DL (ref 11.9–15.1)
IMMATURE GRANULOCYTES: 1 %
LYMPHOCYTES # BLD: 17 % (ref 24–43)
MCH RBC QN AUTO: 32.9 PG (ref 25.2–33.5)
MCHC RBC AUTO-ENTMCNC: 32.3 G/DL (ref 28.4–34.8)
MCV RBC AUTO: 102 FL (ref 82.6–102.9)
MONOCYTES # BLD: 6 % (ref 3–12)
NRBC AUTOMATED: 0 PER 100 WBC
PDW BLD-RTO: 13.1 % (ref 11.8–14.4)
PLATELET # BLD: 254 K/UL (ref 138–453)
PLATELET ESTIMATE: ABNORMAL
PMV BLD AUTO: 10 FL (ref 8.1–13.5)
RBC # BLD: 4.1 M/UL (ref 3.95–5.11)
RBC # BLD: ABNORMAL 10*6/UL
SEG NEUTROPHILS: 75 % (ref 36–65)
SEGMENTED NEUTROPHILS ABSOLUTE COUNT: 10.17 K/UL (ref 1.5–8.1)
WBC # BLD: 13.3 K/UL (ref 3.5–11.3)
WBC # BLD: ABNORMAL 10*3/UL

## 2020-10-18 PROCEDURE — 0UQMXZZ REPAIR VULVA, EXTERNAL APPROACH: ICD-10-PCS | Performed by: ADVANCED PRACTICE MIDWIFE

## 2020-10-18 PROCEDURE — 85025 COMPLETE CBC W/AUTO DIFF WBC: CPT

## 2020-10-18 PROCEDURE — 59400 OBSTETRICAL CARE: CPT | Performed by: ADVANCED PRACTICE MIDWIFE

## 2020-10-18 PROCEDURE — 2500000003 HC RX 250 WO HCPCS: Performed by: ADVANCED PRACTICE MIDWIFE

## 2020-10-18 PROCEDURE — 7200000001 HC VAGINAL DELIVERY

## 2020-10-18 PROCEDURE — 88307 TISSUE EXAM BY PATHOLOGIST: CPT

## 2020-10-18 PROCEDURE — 6370000000 HC RX 637 (ALT 250 FOR IP): Performed by: ADVANCED PRACTICE MIDWIFE

## 2020-10-18 PROCEDURE — 1220000000 HC SEMI PRIVATE OB R&B

## 2020-10-18 RX ORDER — CARBOPROST TROMETHAMINE 250 UG/ML
250 INJECTION, SOLUTION INTRAMUSCULAR PRN
Status: DISCONTINUED | OUTPATIENT
Start: 2020-10-18 | End: 2020-10-20 | Stop reason: HOSPADM

## 2020-10-18 RX ORDER — SODIUM CHLORIDE 0.9 % (FLUSH) 0.9 %
10 SYRINGE (ML) INJECTION PRN
Status: DISCONTINUED | OUTPATIENT
Start: 2020-10-18 | End: 2020-10-20 | Stop reason: HOSPADM

## 2020-10-18 RX ORDER — LIDOCAINE HYDROCHLORIDE 10 MG/ML
30 INJECTION, SOLUTION EPIDURAL; INFILTRATION; INTRACAUDAL; PERINEURAL PRN
Status: COMPLETED | OUTPATIENT
Start: 2020-10-18 | End: 2020-10-18

## 2020-10-18 RX ORDER — SODIUM CHLORIDE, SODIUM LACTATE, POTASSIUM CHLORIDE, CALCIUM CHLORIDE 600; 310; 30; 20 MG/100ML; MG/100ML; MG/100ML; MG/100ML
INJECTION, SOLUTION INTRAVENOUS PRN
Status: DISCONTINUED | OUTPATIENT
Start: 2020-10-18 | End: 2020-10-18

## 2020-10-18 RX ORDER — ALBUTEROL SULFATE 90 UG/1
2 AEROSOL, METERED RESPIRATORY (INHALATION) EVERY 4 HOURS PRN
Status: DISCONTINUED | OUTPATIENT
Start: 2020-10-18 | End: 2020-10-20 | Stop reason: HOSPADM

## 2020-10-18 RX ORDER — SODIUM CHLORIDE 0.9 % (FLUSH) 0.9 %
10 SYRINGE (ML) INJECTION PRN
Status: DISCONTINUED | OUTPATIENT
Start: 2020-10-18 | End: 2020-10-18

## 2020-10-18 RX ORDER — ACETAMINOPHEN 325 MG/1
650 TABLET ORAL EVERY 4 HOURS PRN
Status: DISCONTINUED | OUTPATIENT
Start: 2020-10-18 | End: 2020-10-18

## 2020-10-18 RX ORDER — MONTELUKAST SODIUM 10 MG/1
10 TABLET ORAL NIGHTLY
Status: DISCONTINUED | OUTPATIENT
Start: 2020-10-18 | End: 2020-10-20 | Stop reason: HOSPADM

## 2020-10-18 RX ORDER — IBUPROFEN 800 MG/1
800 TABLET ORAL EVERY 8 HOURS
Status: DISCONTINUED | OUTPATIENT
Start: 2020-10-18 | End: 2020-10-20 | Stop reason: HOSPADM

## 2020-10-18 RX ORDER — METHYLERGONOVINE MALEATE 0.2 MG/ML
200 INJECTION INTRAVENOUS
Status: ACTIVE | OUTPATIENT
Start: 2020-10-18 | End: 2020-10-18

## 2020-10-18 RX ORDER — DOCUSATE SODIUM 100 MG/1
100 CAPSULE, LIQUID FILLED ORAL 2 TIMES DAILY PRN
Status: DISCONTINUED | OUTPATIENT
Start: 2020-10-18 | End: 2020-10-20 | Stop reason: HOSPADM

## 2020-10-18 RX ORDER — FENTANYL CITRATE 50 UG/ML
25 INJECTION, SOLUTION INTRAMUSCULAR; INTRAVENOUS
Status: DISCONTINUED | OUTPATIENT
Start: 2020-10-18 | End: 2020-10-18

## 2020-10-18 RX ORDER — ACETAMINOPHEN 325 MG/1
650 TABLET ORAL EVERY 4 HOURS PRN
Status: DISCONTINUED | OUTPATIENT
Start: 2020-10-18 | End: 2020-10-20 | Stop reason: HOSPADM

## 2020-10-18 RX ORDER — SODIUM CHLORIDE 0.9 % (FLUSH) 0.9 %
10 SYRINGE (ML) INJECTION EVERY 12 HOURS SCHEDULED
Status: DISCONTINUED | OUTPATIENT
Start: 2020-10-18 | End: 2020-10-20 | Stop reason: HOSPADM

## 2020-10-18 RX ORDER — MODIFIED LANOLIN
OINTMENT (GRAM) TOPICAL PRN
Status: DISCONTINUED | OUTPATIENT
Start: 2020-10-18 | End: 2020-10-20 | Stop reason: HOSPADM

## 2020-10-18 RX ORDER — BUDESONIDE AND FORMOTEROL FUMARATE DIHYDRATE 160; 4.5 UG/1; UG/1
1 AEROSOL RESPIRATORY (INHALATION) 2 TIMES DAILY
Status: DISCONTINUED | OUTPATIENT
Start: 2020-10-18 | End: 2020-10-20 | Stop reason: HOSPADM

## 2020-10-18 RX ORDER — MISOPROSTOL 100 UG/1
900 TABLET ORAL PRN
Status: DISCONTINUED | OUTPATIENT
Start: 2020-10-18 | End: 2020-10-20 | Stop reason: HOSPADM

## 2020-10-18 RX ORDER — ONDANSETRON 2 MG/ML
4 INJECTION INTRAMUSCULAR; INTRAVENOUS EVERY 6 HOURS PRN
Status: DISCONTINUED | OUTPATIENT
Start: 2020-10-18 | End: 2020-10-18

## 2020-10-18 RX ORDER — METHYLERGONOVINE MALEATE 0.2 MG/ML
200 INJECTION INTRAVENOUS PRN
Status: DISCONTINUED | OUTPATIENT
Start: 2020-10-18 | End: 2020-10-20 | Stop reason: HOSPADM

## 2020-10-18 RX ORDER — SEVOFLURANE 250 ML/250ML
1 LIQUID RESPIRATORY (INHALATION) CONTINUOUS PRN
Status: DISCONTINUED | OUTPATIENT
Start: 2020-10-18 | End: 2020-10-18

## 2020-10-18 RX ORDER — SODIUM CHLORIDE 0.9 % (FLUSH) 0.9 %
10 SYRINGE (ML) INJECTION EVERY 12 HOURS SCHEDULED
Status: DISCONTINUED | OUTPATIENT
Start: 2020-10-18 | End: 2020-10-18

## 2020-10-18 RX ADMIN — IBUPROFEN 800 MG: 800 TABLET, FILM COATED ORAL at 22:39

## 2020-10-18 RX ADMIN — LIDOCAINE HYDROCHLORIDE 30 ML: 10 INJECTION, SOLUTION EPIDURAL; INFILTRATION; INTRACAUDAL; PERINEURAL at 20:50

## 2020-10-18 RX ADMIN — MONTELUKAST SODIUM 10 MG: 10 TABLET, FILM COATED ORAL at 23:38

## 2020-10-18 ASSESSMENT — PAIN DESCRIPTION - DESCRIPTORS: DESCRIPTORS: CRAMPING;ACHING

## 2020-10-19 PROCEDURE — 6370000000 HC RX 637 (ALT 250 FOR IP): Performed by: ADVANCED PRACTICE MIDWIFE

## 2020-10-19 PROCEDURE — 1220000000 HC SEMI PRIVATE OB R&B

## 2020-10-19 RX ADMIN — IBUPROFEN 800 MG: 800 TABLET, FILM COATED ORAL at 07:24

## 2020-10-19 RX ADMIN — IBUPROFEN 800 MG: 800 TABLET, FILM COATED ORAL at 23:34

## 2020-10-19 RX ADMIN — ACETAMINOPHEN 650 MG: 325 TABLET, FILM COATED ORAL at 13:05

## 2020-10-19 ASSESSMENT — PAIN DESCRIPTION - DESCRIPTORS: DESCRIPTORS: CRAMPING;SORE

## 2020-10-19 ASSESSMENT — PAIN SCALES - GENERAL
PAINLEVEL_OUTOF10: 2
PAINLEVEL_OUTOF10: 3
PAINLEVEL_OUTOF10: 2

## 2020-10-19 NOTE — PROGRESS NOTES
RESPIRATORY ASSESSMENT PROTOCOL                                                                                              Patient Name: Vee Kaplan Room#: 0210/0210-01 : 1995     Admitting diagnosis: Term pregnancy [Z34.90]       Medical History:   Past Medical History:   Diagnosis Date    Asthma        PATIENT ASSESSMENT    LABORATORY DATA  Hematology:   Lab Results   Component Value Date    WBC 13.3 10/18/2020    RBC 4.10 10/18/2020    HGB 13.5 10/18/2020    HCT 41.8 10/18/2020     10/18/2020     Chemistry:  No results found for: PHART, EWF6DKC, PO2ART, P4EVQGAK, UQG5SIN, PBEA    Blood Culture:   Sputum Culture:     VITALS  Pulse: 65   Resp: 18  BP: (!) 128/59       Temp: 97.5 °F (36.4 °C)  Comment:   SKIN COLOR  [x] Normal  [] Pale  [] Dusky  [] Cyanotic      RESPIRATORY PATTERN  [x] Normal  [] Dyspnea  [] Cheyne-Maloney  [] Kussmaul  [] Biots  AMBULATORY  [x] Yes  [] No  [] With Assistance    PEAK FLOW  Predicted:     Personal Best:        VITAL CAPACITY  Predicted value:  ml  Actual Value:  ml  30% of Predicted:  ml  Patient Acuity 0 1 2 3 4 Score   Level of Concious (LOC) [x]  Alert & Oriented or Pt normal LOC []  Confused;follows directions []  Confused & uncooper-ative []  Obtunded []  Comatose 0   Respiratory Rate  (RR) [x]  Reg. rate & pattern. 12 - 20 bpm  []  Increased RR. Greater than 20 bpm   []  SOB w/ exertion or RR greater than 24 bpm []  Access- ory muscle use at rest. Abn.  resp. []  SOB at rest.   0   Bilateral Breath Sounds (BBS) [x]  Clear []  Diminish-ed bases  []  Diminish-ed t/o, or rales   []  Sporadic, scattered wheezes or rhonchi []  Persistentwheezes and, or absent BBS 0   Cough [x]  Strong, effective, & non-prod. []  Effective & prod. Less than 25 ml (2 TBSP) over past 24 hrs []  Ineffective & non-prod to less than 25 ML over past 24 hrs []  Ineffective and, or greater than 25 ml sputum prod. past 24 hrs. []  Nonspon- taneous;  Requires suctioning 0   Pulmonary

## 2020-10-19 NOTE — L&D DELIVERY NOTE
Mother's Information    Labor Events     labor?:  No  Rupture type:  Spontaneous=SROM  Fluid color:  Clear     Mother Delivery Information    Episiotomy:  None  Lacerations:  None  # of Repair Packets:  1  Vaginal Delivery Est. Blood Loss (mL):  300  Surgical or Additional Est. Blood Loss (mL):  0 (View Only):  Edit in Flowsheets   Combined Est. Blood Loss (mL):  300        Challen, Baby Boy Aurelia [[id number]]    Labor Events     labor?:  No   steroids?:  None  Cervical ripening date/time:     Antibiotics received during labor?:  No  Rupture date/time: 10/18/20 20:39:00   Rupture type:  Spontaneous=SROM  Fluid color:  Clear  Induction:  None  Augmentation:  None  Labor complications:  Precipitous Labor          Labor Event Times    Labor onset date/time:     Dilation complete date/time:   10/18/20 2014   Start pushing: 10/18/2020 2025   Decision time (emergent ):        Anesthesia    Method:  Local     Assisted Delivery Details    Forceps attempted?:  No  Vacuum extractor attempted?:  No     Document Additional Attempt       Document Additional Attempt             Shoulder Dystocia    Shoulder dystocia present?:  No  Add Second Maneuver  Add Third Maneuver  Add Fourth Maneuver  Add Fifth Maneuver  Add Sixth Maneuver  Add Seventh Maneuver  Add Eighth Maneuver  Add Ninth Maneuver     [place] Presentation    Presentation:  Vertex  _:  Occiput  _:  Anterior      Information    Head delivery date/time:  10/18/2020 20:39:00   Changing the 's delivery date/time could affect patient care.:     Delivery date/time:   10/18/20 2039   Delivery type:  Vaginal, Spontaneous    Details:         Delivery Providers    Delivering clinician:  FAUSTINA Santana CNM   Provider Role    COOKIE Albert RN       Cord    Vessels:  3 Vessels  Complications:  None  Delayed cord clamping?:  Yes  Cord clamped date/time:  10/18/2020 2046  Cord blood disposition:  Lab  Gases sent?:  No  Stem cell collection (by provider): No     Placenta    Date/time:  10/18/2020 20:48:04  Removal:  Spontaneous  Appearance:  Intact  Disposition:  Lab     Delivery Resuscitation    Method:  Bulb Suction, Stimulation     Apgars    Living status:  Living  Apgars   1 Minute:   5 Minute:   10 Minute 15 Minute 20 Minute   Skin Color: 1  1       Heart Rate: 2  2       Reflex Irritability: 2  2       Muscle Tone: 2  2       Respiratory Effort: 2  2       Total: 9  9               Apgars Assigned ByDenece Cameron     Skin to Skin    Skin to skin initiation date/time: 10/18/20 20:39:00   Skin to skin with:   Mother  Skin to skin end date/time:        [place] Measurements       Delivery Information    Episiotomy:  None  Perineal lacerations:  None    Labial laceration:  left Repaired:  Yes   Vaginal laceration:  No    Cervical laceration:  No    Vaginal delivery est. blood loss (mL):  300  Surgical or additional est. blood loss (mL):  0 (View Only):  Edit in Flowsheets   Combined est. blood loss (mL):  300     Vaginal Delivery Counts    Initial count personnel:  Katty Figures  Initial count verified by:  SSMITHCNM   4x4:   Needles:   Instruments:   Lap Pads:   Sponges:     Initial counts:          Final counts:             Other Procedures    Procedures:  None

## 2020-10-19 NOTE — H&P
connections     Talks on phone: Not on file     Gets together: Not on file     Attends [employer] service: Not on file     Active member of club or organization: Not on file     Attends meetings of clubs or organizations: Not on file     Relationship status: Not on file    Intimate partner violence     Fear of current or ex partner: Not on file     Emotionally abused: Not on file     Physically abused: Not on file     Forced sexual activity: Not on file   Other Topics Concern    Not on file   Social History Narrative    Not on file     Family History:   History reviewed. No pertinent family history. Medications Prior to Admission:  Medications Prior to Admission: omeprazole (PRILOSEC) 10 MG delayed release capsule, Take 10 mg by mouth daily  Prenatal MV-Min-Fe Fum-FA-DHA (PRENATAL 1 PO), Take by mouth  montelukast (SINGULAIR) 10 MG tablet, TAKE 1 TABLET BY MOUTH ONCE DAILY IN THE EVENING  ADVAIR DISKUS 250-50 MCG/DOSE AEPB, INHALE 1 DOSE BY MOUTH TWICE DAILY  PROAIR  (90 Base) MCG/ACT inhaler, INHALE 2 PUFFS BY MOUTH EVERY 4 TO 6 HOURS AS NEEDED    REVIEW OF SYSTEMS:    CONSTITUTIONAL:  negative  RESPIRATORY:  negative  CARDIOVASCULAR:  negative  GASTROINTESTINAL:  negative  ALLERGIC/IMMUNOLOGIC:  negative  NEUROLOGICAL:  negative  BEHAVIOR/PSYCH:  negative    PHYSICAL EXAM:  Vitals:    10/18/20 2011 10/18/20 2100   BP: 125/80 131/79   Pulse: 101 85   Resp: 24    Temp: 97.5 °F (36.4 °C)    TempSrc: Oral      General appearance:  awake, alert, cooperative, no apparent distress, and appears stated age  Neurologic:  Awake, alert, oriented to name, place and time. Lungs:  No increased work of breathing, good air exchange  Abdomen:  Soft, non tender, gravid, consistent with her gestational age, EFW by Leopald's manouever was 6 1/2 lbs   Fetal heart rate:  Reassuring.   Pelvis:  Adequate pelvis  Cervix: complete with BBOW on admission  Contraction frequency:  2-4 minutes    Membranes:  Intact    Labs:   CBC:

## 2020-10-20 VITALS
DIASTOLIC BLOOD PRESSURE: 72 MMHG | SYSTOLIC BLOOD PRESSURE: 114 MMHG | RESPIRATION RATE: 16 BRPM | TEMPERATURE: 97.8 F | HEART RATE: 82 BPM

## 2020-10-20 PROBLEM — Z34.90 TERM PREGNANCY: Status: RESOLVED | Noted: 2020-10-18 | Resolved: 2020-10-20

## 2020-10-20 LAB — SURGICAL PATHOLOGY REPORT: NORMAL

## 2020-10-20 PROCEDURE — 99024 POSTOP FOLLOW-UP VISIT: CPT | Performed by: ADVANCED PRACTICE MIDWIFE

## 2020-10-20 NOTE — FLOWSHEET NOTE
Patient off unit in stable condition. Departure Mode: with significant other.     Mobility at Departure: ambulatory    Discharged to: private residence    Time of Discharge: [phone]

## 2020-10-20 NOTE — DISCHARGE SUMMARY
Obstetrical Discharge Form        Gestational Age:39w0d    Antepartum complications: none    Date of Delivery: 10/18/20    Type of Delivery:        Delivered By:  Rubia WEEMS CNM    Assisted By:N/A}      Baby: male    Anesthesia:  local lidocaine      Intrapartum complications: precipitous labor    Feeding method: breast    Blood type: O NEGATIVE      Rubella:    Rubella Antibody, IGG   Date Value Ref Range Status   03/10/2020 284.1 IU/mL Final     Comment:                 REFERENCE RANGE:  <5.0       NON-REACTIVE (non-immune)  5.0 TO 9.9 EQUIVOCAL  >=10.0     REACTIVE     (immune)             T. Pallidium, IGG:    T. pallidum, IgG   Date Value Ref Range Status   03/10/2020 NONREACTIVE NONREACTIVE Final     Comment:           T. pallidum antibodies are not detected. There is no serological evidence of infection with T. pallidum (early primary syphilis   cannot be excluded). Retest in 2-4 weeks if syphilis is clinically suspect.              Hepatitis B Surface Antigen:   Hepatitis B Surface Ag   Date Value Ref Range Status   03/10/2020 NONREACTIVE NONREACTIVE Final     HIV:  No results found for: [id number]    Results for orders placed or performed during the hospital encounter of 10/18/20   CBC auto differential   Result Value Ref Range    WBC 13.3 (H) 3.5 - 11.3 k/uL    RBC 4.10 3.95 - 5.11 m/uL    Hemoglobin 13.5 11.9 - 15.1 g/dL    Hematocrit 41.8 36.3 - 47.1 %    .0 82.6 - 102.9 fL    MCH 32.9 25.2 - 33.5 pg    MCHC 32.3 28.4 - 34.8 g/dL    RDW 13.1 11.8 - 14.4 %    Platelets 927 246 - 190 k/uL    MPV 10.0 8.1 - 13.5 fL    NRBC Automated 0.0 0.0 per 100 WBC    Differential Type NOT REPORTED     Seg Neutrophils 75 (H) 36 - 65 %    Lymphocytes 17 (L) 24 - 43 %    Monocytes 6 3 - 12 %    Eosinophils % 1 1 - 4 %    Basophils 0 0 - 2 %    Immature Granulocytes 1 (H) 0 %    Segs Absolute 10.17 (H) 1.50 - 8.10 k/uL    Absolute Lymph # 2.20 1.10 - 3.70 k/uL    Absolute Mono # 0.73 0.10 - 1.20 k/uL

## 2020-11-02 ENCOUNTER — POSTPARTUM VISIT (OUTPATIENT)
Dept: OBGYN | Age: 25
End: 2020-11-02
Payer: COMMERCIAL

## 2020-11-02 VITALS
BODY MASS INDEX: 29.57 KG/M2 | HEIGHT: 60 IN | DIASTOLIC BLOOD PRESSURE: 60 MMHG | WEIGHT: 150.6 LBS | SYSTOLIC BLOOD PRESSURE: 122 MMHG

## 2020-11-02 PROCEDURE — 90686 IIV4 VACC NO PRSV 0.5 ML IM: CPT | Performed by: ADVANCED PRACTICE MIDWIFE

## 2020-11-02 PROCEDURE — 90471 IMMUNIZATION ADMIN: CPT | Performed by: ADVANCED PRACTICE MIDWIFE

## 2020-11-02 PROCEDURE — 0503F POSTPARTUM CARE VISIT: CPT | Performed by: ADVANCED PRACTICE MIDWIFE

## 2020-11-02 NOTE — PROGRESS NOTES
breastfeeding. How many Hours of sleep do you get a night: 6-8    Do you have a normal appetite: good    Any problems or pain: no    Do you feel like you coping well: yes    Is baby sleeping:fair    How is baby eating: good    How did first pediatrician visit go: good    EPPDS:1    No results found for this visit on 11/02/20. Nurse: Elizabeth OGDEN    HPI:  PT presents for Post partum exam Follow up 2 weeks after delivery. Patient desires flu shot. Objective   No acute distress  Excellent communications  Well-nourished  Assessment and Plan          Diagnosis Orders   1. Postpartum care following vaginal delivery               I am having Dayana Jones maintain her Advair Diskus, montelukast, ProAir HFA, omeprazole, and Prenatal MV-Min-Fe Fum-FA-DHA (PRENATAL 1 PO). Return in about 4 weeks (around 11/30/2020), or postpartum. There are no Patient Instructions on file for this visit. Over 50% of time spent on counseling and care coordination on: see assessment and plan,  She was also counseled on her preventative health maintenance recommendations and follow-up.         FF time: 15 min      Polo Sanchez,11/2/2020 11:42 AM

## 2020-11-09 RX ORDER — BREAST PUMP
EACH MISCELLANEOUS
Qty: 1 EACH | Refills: 0 | Status: SHIPPED | OUTPATIENT
Start: 2020-11-09 | End: 2022-03-22 | Stop reason: ALTCHOICE

## 2020-11-30 ENCOUNTER — POSTPARTUM VISIT (OUTPATIENT)
Dept: OBGYN | Age: 25
End: 2020-11-30
Payer: COMMERCIAL

## 2020-11-30 VITALS
SYSTOLIC BLOOD PRESSURE: 122 MMHG | DIASTOLIC BLOOD PRESSURE: 74 MMHG | HEIGHT: 62 IN | BODY MASS INDEX: 27.75 KG/M2 | WEIGHT: 150.8 LBS

## 2020-11-30 LAB — HGB, POC: 12.5

## 2020-11-30 PROCEDURE — 0503F POSTPARTUM CARE VISIT: CPT | Performed by: ADVANCED PRACTICE MIDWIFE

## 2020-11-30 RX ORDER — ACETAMINOPHEN AND CODEINE PHOSPHATE 120; 12 MG/5ML; MG/5ML
1 SOLUTION ORAL DAILY
Qty: 28 TABLET | Refills: 12 | Status: SHIPPED | OUTPATIENT
Start: 2020-11-30 | End: 2022-03-22 | Stop reason: ALTCHOICE

## 2020-11-30 NOTE — PROGRESS NOTES
POSTPARTUM EXAM    Date of service: 2020    Reg Loredo  Is a 30 y.o. single female    PT's PCP is: Levi Chapman DO     : 1995     OB History    Para Term  AB Living   1 1 1     1   SAB TAB Ectopic Molar Multiple Live Births           0 1      # Outcome Date GA Lbr Phillip/2nd Weight Sex Delivery Anes PTL Lv   1 Term 10/18/20 39w0d / 00:25 6 lb 2 oz (2.778 kg) M Vag-Spont Local N ZENON      Complications: Precipitous Labor        Social History     Tobacco Use   Smoking Status Former Smoker    Last attempt to quit: 3/3/2020    Years since quittin.7   Smokeless Tobacco Never Used         Social History     Substance and Sexual Activity   Alcohol Use Never    Frequency: Never         Delivery date 10/18/2020    Type of delivery:  Spontaneous vaginal delivery    Laceration:Yes,     Infant gender: male    Infant name: Tye    Are you breast or bottle feeding? Breast    Have you been sexually active since delivery? No    Vital Signs: Blood pressure 122/74, height 5' 2\" (1.575 m), weight 150 lb 12.8 oz (68.4 kg), last menstrual period 2019, currently breastfeeding. Labs:    Blood Type and Rh: O NEGATIVE          Allergies: Patient has no known allergies. Current Outpatient Medications:     Misc.  Devices (BREAST PUMP) MISC, Medela double pump - plans breastfeeding, Disp: 1 each, Rfl: 0    omeprazole (PRILOSEC) 10 MG delayed release capsule, Take 10 mg by mouth daily, Disp: , Rfl:     Prenatal MV-Min-Fe Fum-FA-DHA (PRENATAL 1 PO), Take by mouth, Disp: , Rfl:     ADVAIR DISKUS 250-50 MCG/DOSE AEPB, INHALE 1 DOSE BY MOUTH TWICE DAILY, Disp: , Rfl:     montelukast (SINGULAIR) 10 MG tablet, TAKE 1 TABLET BY MOUTH ONCE DAILY IN THE EVENING, Disp: , Rfl:     PROAIR  (90 Base) MCG/ACT inhaler, INHALE 2 PUFFS BY MOUTH EVERY 4 TO 6 HOURS AS NEEDED, Disp: , Rfl:     Last Yearly:      Last pap:     Last HPV:     Chief Complaint   Patient presents with    Postpartum Care     Postpartum exam. patient had vaginal delivery 10/18/2020. Last pap 174539 ASCUS, + HPV. Discuss cycle control. Patient is breastfeeding. How many Hours of sleep do you get a night: 6-8    Do you have a normal appetite: yes    Any problems or pain: no    Do you feel like you coping well: yes    Is baby sleeping:yes    How is baby eating: good    How did first pediatrician visit go: good    EPPDS:1    Results for orders placed or performed in visit on 11/30/20   POCT hemoglobin   Result Value Ref Range    Hemoglobin 12.5          Nurse: Elizabeth OGDEN    HPI:  PT presents for Post partum exam Follow up 6 week after delivery. Objective   No acute distress  Excellent communications  Well-nourished     Pelvic Exam: GENITAL/URINARY:  External Genitalia:  General appearance; normal, Hair distribution; normal, Lesions absent, patient had repair of labia at 1 oclock that is not approximated but is completely healed and not bothering patient                                                           Assessment and Plan          Diagnosis Orders   1. Postpartum care and examination  POCT hemoglobin             I am having Aurelia Marilyn maintain her Advair Diskus, montelukast, ProAir HFA, omeprazole, Prenatal MV-Min-Fe Fum-FA-DHA (PRENATAL 1 PO), and Breast Pump. Return schedule colpo with Dr. Shivani Appiah. She was also counseled on her preventative health maintenance recommendations and follow-up. There are no Patient Instructions on file for this visit.     Anay Sanchez,11/30/2020 11:09 AM

## 2021-01-12 ENCOUNTER — PROCEDURE VISIT (OUTPATIENT)
Dept: OBGYN | Age: 26
End: 2021-01-12
Payer: COMMERCIAL

## 2021-01-12 ENCOUNTER — HOSPITAL ENCOUNTER (OUTPATIENT)
Age: 26
Setting detail: SPECIMEN
Discharge: HOME | End: 2021-01-12
Payer: COMMERCIAL

## 2021-01-12 VITALS
DIASTOLIC BLOOD PRESSURE: 61 MMHG | BODY MASS INDEX: 28.71 KG/M2 | HEIGHT: 62 IN | WEIGHT: 156 LBS | SYSTOLIC BLOOD PRESSURE: 110 MMHG

## 2021-01-12 DIAGNOSIS — R87.810 ASCUS WITH POSITIVE HIGH RISK HPV CERVICAL: ICD-10-CM

## 2021-01-12 DIAGNOSIS — R87.610 ASCUS WITH POSITIVE HIGH RISK HPV CERVICAL: ICD-10-CM

## 2021-01-12 DIAGNOSIS — R87.810 ASCUS WITH POSITIVE HIGH RISK HPV CERVICAL: Primary | ICD-10-CM

## 2021-01-12 DIAGNOSIS — R87.610 ASCUS WITH POSITIVE HIGH RISK HPV CERVICAL: Primary | ICD-10-CM

## 2021-01-12 PROCEDURE — 57454 BX/CURETT OF CERVIX W/SCOPE: CPT | Performed by: OBSTETRICS & GYNECOLOGY

## 2021-01-12 PROCEDURE — 88305 TISSUE EXAM BY PATHOLOGIST: CPT

## 2021-01-12 NOTE — PROGRESS NOTES
Colposcopy Procedure Note    Indications: Pap smear 9 months ago showed: ASCUS with POSITIVE high risk HPV. The prior pap showed no abnormalities. Prior cervical/vaginal disease: normal exam without visible pathology. Prior cervical treatment: no treatment. Procedure Details   The risks and benefits of the procedure and Written informed consent obtained. Speculum placed in vagina and excellent visualization of cervix achieved, cervix swabbed x 3 with acetic acid solution. Findings:  Cervix: acetowhite lesion(s) noted at 12 o'clock; cervix swabbed with Lugol's solution, SCJ visualized - lesion at 12 o'clock, endocervical curettage performed, cervical biopsies taken at 12 o'clock, specimen labelled and sent to pathology and hemostasis achieved with silver nitrate. Vaginal inspection: normal without visible lesions. Vulvar colposcopy: vulvar colposcopy not performed. Specimens: ecc, 12    Complications: none. Plan:  Specimens labelled and sent to Pathology. Will base further treatment on Pathology findings. Treatment options discussed with patient.

## 2021-01-14 LAB — SURGICAL PATHOLOGY REPORT: NORMAL

## 2021-04-01 ENCOUNTER — TELEPHONE (OUTPATIENT)
Dept: OBGYN | Age: 26
End: 2021-04-01

## 2021-04-01 DIAGNOSIS — Z00.00 WELLNESS EXAMINATION: Primary | ICD-10-CM

## 2021-04-14 ENCOUNTER — PATIENT MESSAGE (OUTPATIENT)
Dept: OBGYN | Age: 26
End: 2021-04-14

## 2021-04-14 DIAGNOSIS — J45.20 MILD INTERMITTENT ASTHMA WITHOUT COMPLICATION: Primary | ICD-10-CM

## 2021-04-15 ENCOUNTER — TELEPHONE (OUTPATIENT)
Dept: OBGYN | Age: 26
End: 2021-04-15

## 2021-04-15 RX ORDER — MONTELUKAST SODIUM 10 MG/1
10 TABLET ORAL NIGHTLY
Qty: 30 TABLET | Refills: 5 | Status: SHIPPED | OUTPATIENT
Start: 2021-04-15 | End: 2021-05-03 | Stop reason: ALTCHOICE

## 2021-04-19 DIAGNOSIS — J30.9 ALLERGIC RHINITIS, UNSPECIFIED SEASONALITY, UNSPECIFIED TRIGGER: Primary | ICD-10-CM

## 2021-04-19 RX ORDER — MONTELUKAST SODIUM 10 MG/1
10 TABLET ORAL NIGHTLY
Qty: 90 TABLET | Refills: 2 | Status: CANCELLED | OUTPATIENT
Start: 2021-04-19

## 2021-04-20 ENCOUNTER — HOSPITAL ENCOUNTER (OUTPATIENT)
Age: 26
Discharge: HOME | End: 2021-04-20
Payer: COMMERCIAL

## 2021-04-20 DIAGNOSIS — Z00.00 WELLNESS EXAMINATION: ICD-10-CM

## 2021-04-20 LAB
CHOLESTEROL/HDL RATIO: 3.1
CHOLESTEROL: 195 MG/DL
GLUCOSE FASTING: 105 MG/DL (ref 70–99)
HDLC SERPL-MCNC: 62 MG/DL
LDL CHOLESTEROL: 117 MG/DL (ref 0–130)
TRIGL SERPL-MCNC: 79 MG/DL
VLDLC SERPL CALC-MCNC: NORMAL MG/DL (ref 1–30)

## 2021-04-20 PROCEDURE — 80061 LIPID PANEL: CPT

## 2021-04-20 PROCEDURE — 83036 HEMOGLOBIN GLYCOSYLATED A1C: CPT

## 2021-04-20 PROCEDURE — 36415 COLL VENOUS BLD VENIPUNCTURE: CPT

## 2021-04-20 PROCEDURE — 82947 ASSAY GLUCOSE BLOOD QUANT: CPT

## 2021-04-21 LAB
ESTIMATED AVERAGE GLUCOSE: 111 MG/DL
HBA1C MFR BLD: 5.5 % (ref 4–6)

## 2021-05-03 ENCOUNTER — OFFICE VISIT (OUTPATIENT)
Dept: FAMILY MEDICINE CLINIC | Age: 26
End: 2021-05-03
Payer: COMMERCIAL

## 2021-05-03 VITALS
HEIGHT: 62 IN | WEIGHT: 158 LBS | SYSTOLIC BLOOD PRESSURE: 120 MMHG | OXYGEN SATURATION: 98 % | DIASTOLIC BLOOD PRESSURE: 70 MMHG | BODY MASS INDEX: 29.08 KG/M2 | HEART RATE: 84 BPM

## 2021-05-03 DIAGNOSIS — J45.20 MILD INTERMITTENT ASTHMA WITHOUT COMPLICATION: Primary | ICD-10-CM

## 2021-05-03 DIAGNOSIS — R63.8 UNABLE TO LOSE WEIGHT: ICD-10-CM

## 2021-05-03 DIAGNOSIS — Z13.0 SCREENING FOR DEFICIENCY ANEMIA: ICD-10-CM

## 2021-05-03 PROCEDURE — G8419 CALC BMI OUT NRM PARAM NOF/U: HCPCS | Performed by: NURSE PRACTITIONER

## 2021-05-03 PROCEDURE — 99204 OFFICE O/P NEW MOD 45 MIN: CPT | Performed by: NURSE PRACTITIONER

## 2021-05-03 PROCEDURE — G8427 DOCREV CUR MEDS BY ELIG CLIN: HCPCS | Performed by: NURSE PRACTITIONER

## 2021-05-03 PROCEDURE — 1036F TOBACCO NON-USER: CPT | Performed by: NURSE PRACTITIONER

## 2021-05-03 RX ORDER — MONTELUKAST SODIUM 10 MG/1
TABLET ORAL
Qty: 90 TABLET | Refills: 3 | Status: SHIPPED | OUTPATIENT
Start: 2021-05-03 | End: 2021-11-03 | Stop reason: SDUPTHER

## 2021-05-03 RX ORDER — MONTELUKAST SODIUM 10 MG/1
10 TABLET ORAL NIGHTLY
Qty: 30 TABLET | Refills: 5 | Status: CANCELLED | OUTPATIENT
Start: 2021-05-03

## 2021-05-03 ASSESSMENT — PATIENT HEALTH QUESTIONNAIRE - PHQ9
1. LITTLE INTEREST OR PLEASURE IN DOING THINGS: 0
SUM OF ALL RESPONSES TO PHQ QUESTIONS 1-9: 0
SUM OF ALL RESPONSES TO PHQ QUESTIONS 1-9: 0

## 2021-05-03 ASSESSMENT — ENCOUNTER SYMPTOMS
SHORTNESS OF BREATH: 0
SINUS PRESSURE: 0
COUGH: 0
SORE THROAT: 0
DIARRHEA: 0
CHEST TIGHTNESS: 0
RHINORRHEA: 0
SINUS PAIN: 0
ABDOMINAL PAIN: 0
WHEEZING: 0
CONSTIPATION: 0

## 2021-05-03 NOTE — PROGRESS NOTES
Name: Je Waddell  : 1995         Chief Complaint:     Chief Complaint   Patient presents with    Establish Care     est care. needs refills. History of Present Illness:      Je Waddell is a 30 y.o.  female who presents with Establish Care (est care. needs refills. )      HPI     Patient presents to establish care. She has a history of asthma and acid reflux. She follows with Mary M[facility]dez for women's health care. She recently gave birth to her son 6 months ago. She admits concerns with her weight since her pregnancy. She admits a well-balanced diet. She is limiting fast food and does not drink soda. 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 Asthma:   Admits to history of asthma diagnosed at 8years old. She continues on Advair, Singulair, and albuterol. She admits to using albuterol only when needed, during allergy season and during illness. She denies chest tightness, cough, wheezing, or shortness of breath. GERD:   Symptoms stable with over-the-counter Prilosec. Triggers include spicy foods. Past Medical History:     Past Medical History:   Diagnosis Date    Asthma       Reviewed all health maintenance requirements and ordered appropriate tests  There are no preventive care reminders to display for this patient. Past Surgical History:     No past surgical history on file. Medications:       Prior to Admission medications    Medication Sig Start Date End Date Taking?  Authorizing Provider   montelukast (SINGULAIR) 10 MG tablet TAKE 1 TABLET BY MOUTH ONCE DAILY IN THE EVENING 5/3/21  Yes FAUSTINA Azevedo CNP   PROAIR  (74 Base) MCG/ACT inhaler Inhale 2 puffs into the lungs every 4 hours as needed for Wheezing or Shortness of Breath 5/3/21  Yes FAUSTINA Azevedo CNP   ADVAIR DISKUS 250-50 MCG/DOSE AEPB INHALE 1 DOSE BY MOUTH TWICE DAILY 5/3/21  Yes FAUSTINA Azevedo CNP   norethindrone (ORTHO MICRONOR) 0.35 MG tablet Take 1 tablet by mouth daily 11/30/20  Yes Dayan Sandoval. Devices (BREAST PUMP) MISC Medela double pump - plans breastfeeding 11/9/20  Yes FAUSTINA Sandoval CNM   omeprazole (PRILOSEC) 10 MG delayed release capsule Take 10 mg by mouth daily   Yes Historical Provider, MD   Prenatal MV-Min-Fe Fum-FA-DHA (PRENATAL 1 PO) Take by mouth   Yes Historical Provider, MD        Allergies:       Patient has no known allergies. Social History:     Tobacco:    reports that she quit smoking about 14 months ago. She has never used smokeless tobacco.  Alcohol:      reports no history of alcohol use. Drug Use:  reports no history of drug use. Family History:     No family history on file. Review of Systems:     Positive and Negative as described in HPI    Review of Systems   Constitutional: Positive for unexpected weight change. Negative for chills, fatigue and fever. HENT: Negative for congestion, postnasal drip, rhinorrhea, sinus pressure, sinus pain and sore throat. Eyes: Negative for visual disturbance. Respiratory: Negative for cough, chest tightness, shortness of breath and wheezing. Cardiovascular: Negative for chest pain and palpitations. Gastrointestinal: Negative for abdominal pain, constipation and diarrhea. Genitourinary: Negative for difficulty urinating. Musculoskeletal: Negative for arthralgias, joint swelling and myalgias. Skin: Negative for rash. Neurological: Negative for dizziness, light-headedness and headaches. Physical Exam:   Vitals:  /70   Pulse 84   Ht 5' 2\" (1.575 m)   Wt 158 lb (71.7 kg)   SpO2 98%   BMI 28.90 kg/m²     Physical Exam  Constitutional:       General: She is not in acute distress. Appearance: Normal appearance. She is normal weight. She is not ill-appearing or toxic-appearing. HENT:      Head: Normocephalic. Right Ear: Tympanic membrane, ear canal and external ear normal. There is no impacted cerumen.       Left Ear: weight  TSH without Reflex    T4, Free   3. Screening for deficiency anemia  CBC     Asthma:  Stable. Refills given    GERD:  Stable. Wellness:  Continue following with Destiny Patel CNM for women's health  [facility] Pap smear, repeat scheduled 1/2022  Reviewed labs from 4/20/2021 including A1c of 5.5, WNL lipid panel, and slightly elevated fasting glucose of 105. Will also assess BMP, CBC, and TFTs. Counseled on well-balanced diet and routine physical activity. Offered referral to dietitian to assist with weight loss. Patient declines at this time. Completed Refills   Requested Prescriptions     Signed Prescriptions Disp Refills    montelukast (SINGULAIR) 10 MG tablet 90 tablet 3     Sig: TAKE 1 TABLET BY MOUTH ONCE DAILY IN THE EVENING    PROAIR  (90 Base) MCG/ACT inhaler 1 Inhaler 5     Sig: Inhale 2 puffs into the lungs every 4 hours as needed for Wheezing or Shortness of Breath    ADVAIR DISKUS 250-50 MCG/DOSE AEPB 60 each 1     Sig: INHALE 1 DOSE BY MOUTH TWICE DAILY       Orders Placed This Encounter   Procedures    CBC     Standing Status:   Future     Standing Expiration Date:   5/3/2022    Basic Metabolic Panel     Standing Status:   Future     Standing Expiration Date:   5/3/2022    TSH without Reflex     Standing Status:   Future     Standing Expiration Date:   5/3/2022    T4, Free     Standing Status:   Future     Standing Expiration Date:   5/3/2022        No results found for this visit on 05/03/21. Return in about 1 year (around 5/3/2022), or if symptoms worsen or fail to improve, for wellness with labs now/soon.     Electronically signed by FAUSTINA Palomares CNP on 05/03/21 at 5:09 PM.

## 2021-05-05 RX ORDER — ALBUTEROL SULFATE 90 UG/1
2 AEROSOL, METERED RESPIRATORY (INHALATION) EVERY 6 HOURS PRN
Qty: 3 INHALER | Refills: 1 | Status: SHIPPED | OUTPATIENT
Start: 2021-05-05

## 2021-05-11 ENCOUNTER — HOSPITAL ENCOUNTER (OUTPATIENT)
Age: 26
Discharge: HOME | End: 2021-05-11
Payer: COMMERCIAL

## 2021-05-11 DIAGNOSIS — Z13.0 SCREENING FOR DEFICIENCY ANEMIA: ICD-10-CM

## 2021-05-11 DIAGNOSIS — J45.20 MILD INTERMITTENT ASTHMA WITHOUT COMPLICATION: ICD-10-CM

## 2021-05-11 DIAGNOSIS — R63.8 UNABLE TO LOSE WEIGHT: ICD-10-CM

## 2021-05-11 LAB
ANION GAP SERPL CALCULATED.3IONS-SCNC: 11 MMOL/L (ref 9–17)
BUN BLDV-MCNC: 12 MG/DL (ref 6–20)
BUN/CREAT BLD: 16 (ref 9–20)
CALCIUM SERPL-MCNC: 9.9 MG/DL (ref 8.6–10.4)
CHLORIDE BLD-SCNC: 101 MMOL/L (ref 98–107)
CO2: 24 MMOL/L (ref 20–31)
CREAT SERPL-MCNC: 0.74 MG/DL (ref 0.5–0.9)
GFR AFRICAN AMERICAN: >60 ML/MIN
GFR NON-AFRICAN AMERICAN: >60 ML/MIN
GFR SERPL CREATININE-BSD FRML MDRD: ABNORMAL ML/MIN/{1.73_M2}
GFR SERPL CREATININE-BSD FRML MDRD: ABNORMAL ML/MIN/{1.73_M2}
GLUCOSE BLD-MCNC: 107 MG/DL (ref 70–99)
HCT VFR BLD CALC: 42.6 % (ref 36.3–47.1)
HEMOGLOBIN: 14 G/DL (ref 11.9–15.1)
MCH RBC QN AUTO: 31.5 PG (ref 25.2–33.5)
MCHC RBC AUTO-ENTMCNC: 32.9 G/DL (ref 28.4–34.8)
MCV RBC AUTO: 95.9 FL (ref 82.6–102.9)
NRBC AUTOMATED: 0 PER 100 WBC
PDW BLD-RTO: 11.8 % (ref 11.8–14.4)
PLATELET # BLD: 296 K/UL (ref 138–453)
PMV BLD AUTO: 8.6 FL (ref 8.1–13.5)
POTASSIUM SERPL-SCNC: 4.3 MMOL/L (ref 3.7–5.3)
RBC # BLD: 4.44 M/UL (ref 3.95–5.11)
SODIUM BLD-SCNC: 136 MMOL/L (ref 135–144)
THYROXINE, FREE: 1.16 NG/DL (ref 0.93–1.7)
TSH SERPL DL<=0.05 MIU/L-ACNC: 1.32 MIU/L (ref 0.3–5)
WBC # BLD: 7.6 K/UL (ref 3.5–11.3)

## 2021-05-11 PROCEDURE — 36415 COLL VENOUS BLD VENIPUNCTURE: CPT

## 2021-05-11 PROCEDURE — 85027 COMPLETE CBC AUTOMATED: CPT

## 2021-05-11 PROCEDURE — 84439 ASSAY OF FREE THYROXINE: CPT

## 2021-05-11 PROCEDURE — 80048 BASIC METABOLIC PNL TOTAL CA: CPT

## 2021-05-11 PROCEDURE — 84443 ASSAY THYROID STIM HORMONE: CPT

## 2021-11-03 ENCOUNTER — HOSPITAL ENCOUNTER (OUTPATIENT)
Age: 26
Setting detail: SPECIMEN
Discharge: HOME | End: 2021-11-03
Payer: COMMERCIAL

## 2021-11-03 ENCOUNTER — OFFICE VISIT (OUTPATIENT)
Dept: OBGYN | Age: 26
End: 2021-11-03
Payer: COMMERCIAL

## 2021-11-03 VITALS
HEIGHT: 62 IN | SYSTOLIC BLOOD PRESSURE: 118 MMHG | BODY MASS INDEX: 29.63 KG/M2 | WEIGHT: 161 LBS | DIASTOLIC BLOOD PRESSURE: 74 MMHG

## 2021-11-03 DIAGNOSIS — N92.6 IRREGULAR MENSES: ICD-10-CM

## 2021-11-03 DIAGNOSIS — Z23 NEED FOR VACCINATION FOR H FLU TYPE B: Primary | ICD-10-CM

## 2021-11-03 PROCEDURE — 87070 CULTURE OTHR SPECIMN AEROBIC: CPT

## 2021-11-03 PROCEDURE — 99213 OFFICE O/P EST LOW 20 MIN: CPT | Performed by: ADVANCED PRACTICE MIDWIFE

## 2021-11-03 PROCEDURE — 87510 GARDNER VAG DNA DIR PROBE: CPT

## 2021-11-03 PROCEDURE — 87491 CHLMYD TRACH DNA AMP PROBE: CPT

## 2021-11-03 PROCEDURE — 87591 N.GONORRHOEAE DNA AMP PROB: CPT

## 2021-11-03 PROCEDURE — 90674 CCIIV4 VAC NO PRSV 0.5 ML IM: CPT | Performed by: ADVANCED PRACTICE MIDWIFE

## 2021-11-03 PROCEDURE — 87660 TRICHOMONAS VAGIN DIR PROBE: CPT

## 2021-11-03 PROCEDURE — 90471 IMMUNIZATION ADMIN: CPT | Performed by: ADVANCED PRACTICE MIDWIFE

## 2021-11-03 PROCEDURE — 87480 CANDIDA DNA DIR PROBE: CPT

## 2021-11-03 RX ORDER — MONTELUKAST SODIUM 10 MG/1
TABLET ORAL
Qty: 90 TABLET | Refills: 3 | Status: SHIPPED | OUTPATIENT
Start: 2021-11-03 | End: 2022-11-02

## 2021-11-03 RX ORDER — LEVONORGESTREL AND ETHINYL ESTRADIOL 90; 20 UG/1; UG/1
1 TABLET ORAL DAILY
Qty: 28 TABLET | Refills: 12 | Status: SHIPPED | OUTPATIENT
Start: 2021-11-03

## 2021-11-03 NOTE — PROGRESS NOTES
PROBLEM VISIT     Date of service: 11/3/2021    Amira Sandoval  Is a 27 y.o.  female    PT's PCP is: FAUSTINA Pena - CNP     : 1995                                             Subjective:       No LMP recorded (lmp unknown). OB History    Para Term  AB Living   1 1 1     1   SAB TAB Ectopic Molar Multiple Live Births           0 1      # Outcome Date GA Lbr Phillip/2nd Weight Sex Delivery Anes PTL Lv   1 Term 10/18/20 39w0d / 00:25 6 lb 2 oz (2.778 kg) M Vag-Spont Local N ZENON      Complications: Precipitous Labor        Social History     Tobacco Use   Smoking Status Former Smoker    Quit date: 3/3/2020    Years since quittin.6   Smokeless Tobacco Never Used        Social History     Substance and Sexual Activity   Alcohol Use Never       Allergies: Patient has no known allergies. Current Outpatient Medications:     montelukast (SINGULAIR) 10 MG tablet, TAKE 1 TABLET BY MOUTH ONCE DAILY IN THE EVENING, Disp: 90 tablet, Rfl: 3    levonorgestrel-ethinyl estradiol (LYBREL) 90-20 MCG per tablet, Take 1 tablet by mouth daily, Disp: 28 tablet, Rfl: 12    albuterol sulfate HFA (VENTOLIN HFA) 108 (90 Base) MCG/ACT inhaler, Inhale 2 puffs into the lungs every 6 hours as needed for Wheezing or Shortness of Breath, Disp: 3 Inhaler, Rfl: 1    ADVAIR DISKUS 250-50 MCG/DOSE AEPB, INHALE 1 DOSE BY MOUTH TWICE DAILY, Disp: 60 each, Rfl: 1    norethindrone (ORTHO MICRONOR) 0.35 MG tablet, Take 1 tablet by mouth daily, Disp: 28 tablet, Rfl: 12    Misc.  Devices (BREAST PUMP) MISC, Medela double pump - plans breastfeeding, Disp: 1 each, Rfl: 0    omeprazole (PRILOSEC) 10 MG delayed release capsule, Take 10 mg by mouth daily, Disp: , Rfl:     PROAIR  (90 Base) MCG/ACT inhaler, Inhale 2 puffs into the lungs every 4 hours as needed for Wheezing or Shortness of Breath (Patient not taking: Reported on 11/3/2021), Disp: 1 Inhaler, Rfl: 5    Prenatal MV-Min-Fe Fum-FA-DHA (PRENATAL 1 PO), Take by mouth (Patient not taking: Reported on 11/3/2021), Disp: , Rfl:     Social History     Substance and Sexual Activity   Sexual Activity Yes    Partners: Male    Birth control/protection: Pill       Last Yearly:  04/2020    Last pap:04/2020    Last HPV: 04/2020    Have you had a positive covid test: No    Have you had the covid immunization: No    Chief Complaint   Patient presents with    Menstrual Problem     C/O off and on spotting for 1 month. 1 day of moderate bleeding mid Oct. C/O off and on cramps and lback pain. Patient is currently breastfeedong and is taking Ortho Micronor. PE:  Vital Signs  Blood pressure 118/74, height 5' 2\" (1.575 m), weight 161 lb (73 kg), not currently breastfeeding. Estimated body mass index is 29.45 kg/m² as calculated from the following:    Height as of this encounter: 5' 2\" (1.575 m). Weight as of this encounter: 161 lb (73 kg). No data recorded    NURSE: Fam OGDEN    HPI: patient has started to have irregular bleeding on POPs; is weaning from pumping now     PT denies fever, chills, nausea and vomiting       Objective  Lymphatic:   no lymphadenopathy      GI: Abdomen soft, non-tender. BS normal. No masses,  No organomegaly           Extremities: normal strength, tone, and muscle mass   Breasts:lactating   Pelvic Exam: GENITAL/URINARY:  External Genitalia:  General appearance; normal, Hair distribution; normal, Lesions absent  Urethral Meatus:  Size normal, Location normal, Lesions absent, Prolapse absent  Urethra: Fullness absent, Masses absent  Bladder:  Fullness absent, Masses absent, Tenderness absent, Cystocele absent  Vagina:  General appearance normal, Estrogen effect normal, Discharge absent, Lesions absent, Pelvic support normal  Cervix:  General appearance normal, Lesions absent, Discharge absent, Tenderness absent, Enlargement absent, Nodularity absent  Uterus:  Size normal, Tenderness absent  Adenexa:   Masses absent, Tenderness absent  Anus/Perineum:  Lesions absent and Masses absent                                                       Results reviewed today:    No results found for this visit on 11/03/21. Assessment and Plan          Diagnosis Orders   1. Need for vaccination for H flu type B  INFLUENZA, MDCK QUADV, 2 YRS AND OLDER, IM, PF, PREFILL SYR OR SDV, 0.5ML (FLUCELVAX QUADV, PF)   2. Irregular menses  levonorgestrel-ethinyl estradiol (LYBREL) 90-20 MCG per tablet    Culture, Genital    C.trachomatis N.gonorrhoeae DNA     Will change to combined pill and patient desires reaching amenorrhea        I am having Aurelia Sanders start on levonorgestrel-ethinyl estradiol. I am also having her maintain her omeprazole, Prenatal MV-Min-Fe Fum-FA-DHA (PRENATAL 1 PO), Breast Pump, norethindrone, ProAir HFA, Advair Diskus, albuterol sulfate HFA, and montelukast.    Return for will call with results. Patient Instructions     Patient Education        Influenza (Flu) Vaccine (Inactivated or Recombinant): What You Need to Know  Why get vaccinated? Influenza vaccine can prevent influenza (flu). Flu is a contagious disease that spreads around the [place] every year, usually between October and May. Anyone can get the flu, but it is more dangerous for some people. Infants and young children, people 72years of age and older, pregnant women, and people with certain health conditions or a weakened immune system are at greatest risk of flu complications. Pneumonia, bronchitis, sinus infections and ear infections are examples of flu-related complications. If you have a medical condition, such as heart disease, cancer or diabetes, flu can make it worse. Flu can cause fever and chills, sore throat, muscle aches, fatigue, cough, headache, and runny or stuffy nose. Some people may have vomiting and diarrhea, though this is more common in children than adults.   Each year, thousands of people in the [facility] die from flu, and many more are hospitalized. Flu vaccine prevents millions of illnesses and flu-related visits to the doctor each year. Influenza vaccine  CDC recommends everyone 10months of age and older get vaccinated every flu season. Children 6 months through 6years of age may need 2 doses during a single flu season. Everyone else needs only 1 dose each flu season. It takes about 2 weeks for protection to develop after vaccination. There are many flu viruses, and they are always changing. Each year a new flu vaccine is made to protect against three or four viruses that are likely to cause disease in the upcoming flu season. Even when the vaccine doesn't exactly match these viruses, it may still provide some protection. Influenza vaccine does not cause flu. Influenza vaccine may be given at the same time as other vaccines. Talk with your health care provider  Tell your vaccine provider if the person getting the vaccine:  · Has had an allergic reaction after a previous dose of influenza vaccine, or has any severe, life-threatening allergies. · Has ever had Guillain-Barré Syndrome (also called GBS). In some cases, your health care provider may decide to postpone influenza vaccination to a future visit. People with minor illnesses, such as a cold, may be vaccinated. People who are moderately or severely ill should usually wait until they recover before getting influenza vaccine. Your health care provider can give you more information. Risks of a vaccine reaction  · Soreness, redness, and swelling where shot is given, fever, muscle aches, and headache can happen after influenza vaccine. · There may be a very small increased risk of Guillain-Barré Syndrome (GBS) after inactivated influenza vaccine (the flu shot). [facility] children who get the flu shot along with pneumococcal vaccine (PCV13), and/or DTaP vaccine at the same time might be slightly more likely to have a seizure caused by fever.  Tell your health care provider if a child who is getting flu vaccine has ever had a seizure. People sometimes faint after medical procedures, including vaccination. Tell your provider if you feel dizzy or have vision changes or ringing in the ears. As with any medicine, there is a very remote chance of a vaccine causing a severe allergic reaction, other serious injury, or death. What if there is a serious problem? An allergic reaction could occur after the vaccinated person leaves the clinic. If you see signs of a severe allergic reaction (hives, swelling of the face and throat, difficulty breathing, a fast heartbeat, dizziness, or weakness), call 9-1-1 and get the person to the nearest hospital.  For other signs that concern you, call your health care provider. Adverse reactions should be reported to the Vaccine Adverse Event Reporting System (VAERS). Your health care provider will usually file this report, or you can do it yourself. Visit the VAERS website at www.vaers. hhs.gov or call 5-736.935.7275. VAERS is only for reporting reactions, and VAERS staff do not give medical advice. The National Vaccine Injury Compensation Program  The National Vaccine Injury Compensation Program (VICP) is a federal program that was created to compensate people who may have been injured by certain vaccines. Visit the VICP website at www.hrsa.gov/vaccinecompensation or call 3-303.281.2662 to learn about the program and about filing a claim. There is a time limit to file a claim for compensation. How can I learn more? · Ask your healthcare provider. · Call your local or state health department. · Contact the Centers for Disease Control and Prevention (CDC):  ? Call 7-302.219.8015 (6-404-QBA-INFO) or  ? Visit CDC's website at www.cdc.gov/flu  Vaccine Information Statement (Interim)  Inactivated Influenza Vaccine  8/15/2019  [address] [employer] Letters [id number]  Department of Health and Human Services  Centers for Disease Control and Prevention  Many Vaccine Information Statements are available in [place] and other languages. See www.immunize.org/vis. Muchas hojas de información sobre vacunas están disponibles en español y en otros idiomas. Visite www.immunize.org/vis. Care instructions adapted under license by [facility] ([facility]). If you have questions about a medical condition or this instruction, always ask your healthcare professional. [place] [address] any warranty or liability for your use of this information. Over 50% of time spent on counseling and care coordination on: see assessment and plan,  She was also counseled on her preventative health maintenance recommendations and follow-up.         FF time: 20 min      FAUSTINA Verde CNM,11/3/2021 5:20 PM

## 2021-11-03 NOTE — PATIENT INSTRUCTIONS
Patient Education        Influenza (Flu) Vaccine (Inactivated or Recombinant): What You Need to Know  Why get vaccinated? Influenza vaccine can prevent influenza (flu). Flu is a contagious disease that spreads around the [place] every year, usually between October and May. Anyone can get the flu, but it is more dangerous for some people. Infants and young children, people 72years of age and older, pregnant women, and people with certain health conditions or a weakened immune system are at greatest risk of flu complications. Pneumonia, bronchitis, sinus infections and ear infections are examples of flu-related complications. If you have a medical condition, such as heart disease, cancer or diabetes, flu can make it worse. Flu can cause fever and chills, sore throat, muscle aches, fatigue, cough, headache, and runny or stuffy nose. Some people may have vomiting and diarrhea, though this is more common in children than adults. Each year, thousands of people in the [facility] die from flu, and many more are hospitalized. Flu vaccine prevents millions of illnesses and flu-related visits to the doctor each year. Influenza vaccine  CDC recommends everyone 10months of age and older get vaccinated every flu season. Children 6 months through 6years of age may need 2 doses during a single flu season. Everyone else needs only 1 dose each flu season. It takes about 2 weeks for protection to develop after vaccination. There are many flu viruses, and they are always changing. Each year a new flu vaccine is made to protect against three or four viruses that are likely to cause disease in the upcoming flu season. Even when the vaccine doesn't exactly match these viruses, it may still provide some protection. Influenza vaccine does not cause flu. Influenza vaccine may be given at the same time as other vaccines.   Talk with your health care provider  Tell your vaccine provider if the person getting the yourself. Visit the VAERS website at www.vaers. hhs.gov or call 6-997.797.3670. VAERS is only for reporting reactions, and VAERS staff do not give medical advice. The National Vaccine Injury Compensation Program  The National Vaccine Injury Compensation Program (VICP) is a federal program that was created to compensate people who may have been injured by certain vaccines. Visit the VICP website at www.hrsa.gov/vaccinecompensation or call 4-694.534.6808 to learn about the program and about filing a claim. There is a time limit to file a claim for compensation. How can I learn more? · Ask your healthcare provider. · Call your local or state health department. · Contact the Centers for Disease Control and Prevention (CDC):  ? Call 8-889.764.6674 (1-800-CDC-INFO) or  ? Visit CDC's website at www.cdc.gov/flu  Vaccine Information Statement (Interim)  Inactivated Influenza Vaccine  8/15/2019  42 U. Omaira Given 901GN-03  Department of Health and Human Services  Centers for Disease Control and Prevention  Many Vaccine Information Statements are available in [place] and other languages. See www.immunize.org/vis. Muchas hojas de información sobre vacunas están disponibles en español y en otros idiomas. Visite www.immunize.org/vis. Care instructions adapted under license by [facility] ([facility]). If you have questions about a medical condition or this instruction, always ask your healthcare professional. [place] [address] any warranty or liability for your use of this information.

## 2021-11-05 DIAGNOSIS — N92.6 IRREGULAR MENSES: ICD-10-CM

## 2021-11-05 LAB
C TRACH DNA GENITAL QL NAA+PROBE: NEGATIVE
DIRECT EXAM: ABNORMAL
Lab: ABNORMAL
N. GONORRHOEAE DNA: NEGATIVE
SPECIMEN DESCRIPTION: ABNORMAL
SPECIMEN DESCRIPTION: NORMAL

## 2021-11-06 LAB
CULTURE: NORMAL
Lab: NORMAL
SPECIMEN DESCRIPTION: NORMAL

## 2021-11-08 DIAGNOSIS — N76.0 BV (BACTERIAL VAGINOSIS): Primary | ICD-10-CM

## 2021-11-08 DIAGNOSIS — B96.89 BV (BACTERIAL VAGINOSIS): Primary | ICD-10-CM

## 2021-11-08 RX ORDER — METRONIDAZOLE 500 MG/1
500 TABLET ORAL 2 TIMES DAILY
Qty: 14 TABLET | Refills: 0 | Status: SHIPPED | OUTPATIENT
Start: 2021-11-08 | End: 2021-11-15

## 2022-01-12 ENCOUNTER — OFFICE VISIT (OUTPATIENT)
Dept: OBGYN | Age: 27
End: 2022-01-12
Payer: COMMERCIAL

## 2022-01-12 VITALS
DIASTOLIC BLOOD PRESSURE: 80 MMHG | SYSTOLIC BLOOD PRESSURE: 122 MMHG | WEIGHT: 162.8 LBS | BODY MASS INDEX: 29.96 KG/M2 | HEIGHT: 62 IN

## 2022-01-12 DIAGNOSIS — Z01.419 ENCOUNTER FOR ANNUAL ROUTINE GYNECOLOGICAL EXAMINATION: Primary | ICD-10-CM

## 2022-01-12 DIAGNOSIS — Z12.4 SCREENING FOR MALIGNANT NEOPLASM OF CERVIX: ICD-10-CM

## 2022-01-12 PROCEDURE — 99395 PREV VISIT EST AGE 18-39: CPT | Performed by: ADVANCED PRACTICE MIDWIFE

## 2022-01-12 ASSESSMENT — ENCOUNTER SYMPTOMS
SHORTNESS OF BREATH: 0
BACK PAIN: 0
ABDOMINAL PAIN: 0

## 2022-01-12 NOTE — PROGRESS NOTES
YEARLY PHYSICAL    Date of service: 2022    Mahsa Medina  Is a 27 y.o.  single female    PT's PCP is: FAUSTINA Ramos CNP     : 1995                                             Subjective:       Patient's last menstrual period was 2022 (exact date). Are your menses regular: no    OB History    Para Term  AB Living   1 1 1     1   SAB IAB Ectopic Molar Multiple Live Births           0 1      # Outcome Date GA Lbr Phillip/2nd Weight Sex Delivery Anes PTL Lv   1 Term 10/18/20 39w0d / 00:25 6 lb 2 oz (2.778 kg) M Vag-Spont Local N ZENON      Complications: Precipitous Labor        Social History     Tobacco Use   Smoking Status Former Smoker    Quit date: 3/3/2020    Years since quittin.8   Smokeless Tobacco Never Used        Social History     Substance and Sexual Activity   Alcohol Use Yes    Comment: rarely       Family History   Problem Relation Age of Onset    Diabetes Father     Hypertension Father        Any family history of breast or ovarian cancer: No    Any family history of blood clots: No    Have you had a positive covid test: No    Have you had the covid immunization: No      Allergies: Patient has no known allergies. Current Outpatient Medications:     montelukast (SINGULAIR) 10 MG tablet, TAKE 1 TABLET BY MOUTH ONCE DAILY IN THE EVENING, Disp: 90 tablet, Rfl: 3    levonorgestrel-ethinyl estradiol (LYBREL) 90-20 MCG per tablet, Take 1 tablet by mouth daily, Disp: 28 tablet, Rfl: 12    albuterol sulfate HFA (VENTOLIN HFA) 108 (90 Base) MCG/ACT inhaler, Inhale 2 puffs into the lungs every 6 hours as needed for Wheezing or Shortness of Breath, Disp: 3 Inhaler, Rfl: 1    ADVAIR DISKUS 250-50 MCG/DOSE AEPB, INHALE 1 DOSE BY MOUTH TWICE DAILY, Disp: 60 each, Rfl: 1    Misc.  Devices (BREAST PUMP) MISC, Medela double pump - plans breastfeeding, Disp: 1 each, Rfl: 0    omeprazole for shortness of breath. Cardiovascular: Negative for chest pain. Gastrointestinal: Negative for abdominal pain. Genitourinary: Negative for dysuria. Musculoskeletal: Negative for back pain. Skin: Negative for rash. Neurological: Negative for headaches. Psychiatric/Behavioral: The patient is not nervous/anxious. Objective  Lymphatic:   no lymphadenopathy  Heent:   negative   Cor: regular rate and rhythm, no murmurs              Pul:clear to auscultation bilaterally- no wheezes, rales or rhonchi, normal air movement, no respiratory distress      GI: Abdomen soft, non-tender. BS normal. No masses,  No organomegaly           Extremities: normal strength, tone, and muscle mass   Breasts: Breast:normal appearance, no masses or tenderness   Pelvic Exam: GENITAL/URINARY:  External Genitalia:  General appearance; normal, Hair distribution; normal, Lesions absent  Urethral Meatus:  Size normal, Location normal, Lesions absent, Prolapse absent  Urethra: Fullness absent, Masses absent  Bladder:  Fullness absent, Masses absent, Tenderness absent, Cystocele absent  Vagina:  General appearance normal, Estrogen effect normal, Discharge absent, Lesions absent, Pelvic support normal  Cervix:  General appearance normal, Lesions absent, Discharge absent, Tenderness absent, Enlargement absent, Nodularity absent  Uterus:  Size normal, Tenderness absent  Adenexa: Masses absent, Tenderness absent  Anus/Perineum:  Lesions absent and Masses absent                                                              Assessment and Plan          Diagnosis Orders   1. Encounter for annual routine gynecological examination     2. Screening for malignant neoplasm of cervix  PAP SMEAR             I am having Aurelia Sanders maintain her omeprazole, Prenatal MV-Min-Fe Fum-FA-DHA (PRENATAL 1 PO), Breast Pump, norethindrone, ProAir HFA, Advair Diskus, albuterol sulfate HFA, montelukast, and levonorgestrel-ethinyl estradiol. Return in about 1 year (around 1/12/2023) for yearly. She was also counseled on her preventative health maintenance recommendations and follow-up. There are no Patient Instructions on file for this visit.     FAUSTINA Pringle CNM,1/12/2022 4:57 PM

## 2022-01-19 LAB
GYNECOLOGY CYTOLOGY REPORT: NORMAL
HPV REFLEX: NORMAL

## 2022-03-01 ENCOUNTER — PROCEDURE VISIT (OUTPATIENT)
Dept: OBGYN | Age: 27
End: 2022-03-01
Payer: COMMERCIAL

## 2022-03-01 ENCOUNTER — HOSPITAL ENCOUNTER (OUTPATIENT)
Age: 27
Setting detail: SPECIMEN
Discharge: HOME | End: 2022-03-01
Payer: COMMERCIAL

## 2022-03-01 DIAGNOSIS — R87.612 LGSIL ON PAP SMEAR OF CERVIX: ICD-10-CM

## 2022-03-01 DIAGNOSIS — R87.612 LGSIL ON PAP SMEAR OF CERVIX: Primary | ICD-10-CM

## 2022-03-01 PROCEDURE — 57454 BX/CURETT OF CERVIX W/SCOPE: CPT | Performed by: OBSTETRICS & GYNECOLOGY

## 2022-03-01 PROCEDURE — 88305 TISSUE EXAM BY PATHOLOGIST: CPT

## 2022-03-03 LAB — SURGICAL PATHOLOGY REPORT: NORMAL

## 2022-03-17 ENCOUNTER — TELEPHONE (OUTPATIENT)
Dept: OBGYN CLINIC | Age: 27
End: 2022-03-17

## 2022-03-22 NOTE — PROGRESS NOTES
Patient instructed on the pre-operative, intra-operative, and post-operative process. Patient instructed on NPO status. Medication instructions and pre operative instruction sheet reviewed with the patient.

## 2022-03-25 ENCOUNTER — HOSPITAL ENCOUNTER (OUTPATIENT)
Dept: PREADMISSION TESTING | Age: 27
Setting detail: SPECIMEN
Discharge: HOME | End: 2022-03-29
Payer: COMMERCIAL

## 2022-03-25 DIAGNOSIS — Z01.818 PREOP TESTING: ICD-10-CM

## 2022-03-25 DIAGNOSIS — Z01.818 PREOP TESTING: Primary | ICD-10-CM

## 2022-03-25 PROCEDURE — C9803 HOPD COVID-19 SPEC COLLECT: HCPCS

## 2022-03-25 PROCEDURE — U0005 INFEC AGEN DETEC AMPLI PROBE: HCPCS

## 2022-03-25 PROCEDURE — U0003 INFECTIOUS AGENT DETECTION BY NUCLEIC ACID (DNA OR RNA); SEVERE ACUTE RESPIRATORY SYNDROME CORONAVIRUS 2 (SARS-COV-2) (CORONAVIRUS DISEASE [COVID-19]), AMPLIFIED PROBE TECHNIQUE, MAKING USE OF HIGH THROUGHPUT TECHNOLOGIES AS DESCRIBED BY CMS-2020-01-R: HCPCS

## 2022-03-26 LAB
SARS-COV-2: NORMAL
SARS-COV-2: NOT DETECTED
SOURCE: NORMAL

## 2022-03-30 NOTE — TELEPHONE ENCOUNTER
Spoke to Leighton Dubon at [facility], patient's LEEP has been approved. Auth # is [id number] with a date range of 3/30/22-5/28/22.   Nu Evans at [facility] precert aware of approval.

## 2022-03-31 ENCOUNTER — ANESTHESIA EVENT (OUTPATIENT)
Dept: OPERATING ROOM | Age: 27
End: 2022-03-31
Payer: COMMERCIAL

## 2022-04-01 ENCOUNTER — ANESTHESIA (OUTPATIENT)
Dept: OPERATING ROOM | Age: 27
End: 2022-04-01
Payer: COMMERCIAL

## 2022-04-01 ENCOUNTER — HOSPITAL ENCOUNTER (OUTPATIENT)
Age: 27
Setting detail: OUTPATIENT SURGERY
Discharge: HOME | End: 2022-04-01
Attending: OBSTETRICS & GYNECOLOGY | Admitting: OBSTETRICS & GYNECOLOGY
Payer: COMMERCIAL

## 2022-04-01 VITALS
RESPIRATION RATE: 16 BRPM | HEIGHT: 62 IN | WEIGHT: 158 LBS | DIASTOLIC BLOOD PRESSURE: 62 MMHG | OXYGEN SATURATION: 97 % | TEMPERATURE: 97 F | SYSTOLIC BLOOD PRESSURE: 107 MMHG | BODY MASS INDEX: 29.08 KG/M2 | HEART RATE: 83 BPM

## 2022-04-01 VITALS
SYSTOLIC BLOOD PRESSURE: 100 MMHG | RESPIRATION RATE: 16 BRPM | DIASTOLIC BLOOD PRESSURE: 51 MMHG | OXYGEN SATURATION: 96 %

## 2022-04-01 LAB — HCG(URINE) PREGNANCY TEST: NEGATIVE

## 2022-04-01 PROCEDURE — 2580000003 HC RX 258: Performed by: NURSE ANESTHETIST, CERTIFIED REGISTERED

## 2022-04-01 PROCEDURE — 2500000003 HC RX 250 WO HCPCS: Performed by: OBSTETRICS & GYNECOLOGY

## 2022-04-01 PROCEDURE — 2709999900 HC NON-CHARGEABLE SUPPLY: Performed by: OBSTETRICS & GYNECOLOGY

## 2022-04-01 PROCEDURE — 7100000010 HC PHASE II RECOVERY - FIRST 15 MIN: Performed by: OBSTETRICS & GYNECOLOGY

## 2022-04-01 PROCEDURE — 3600000012 HC SURGERY LEVEL 2 ADDTL 15MIN: Performed by: OBSTETRICS & GYNECOLOGY

## 2022-04-01 PROCEDURE — 3700000000 HC ANESTHESIA ATTENDED CARE: Performed by: OBSTETRICS & GYNECOLOGY

## 2022-04-01 PROCEDURE — 7100000011 HC PHASE II RECOVERY - ADDTL 15 MIN: Performed by: OBSTETRICS & GYNECOLOGY

## 2022-04-01 PROCEDURE — 3700000001 HC ADD 15 MINUTES (ANESTHESIA): Performed by: OBSTETRICS & GYNECOLOGY

## 2022-04-01 PROCEDURE — 3600000002 HC SURGERY LEVEL 2 BASE: Performed by: OBSTETRICS & GYNECOLOGY

## 2022-04-01 PROCEDURE — 57522 CONIZATION OF CERVIX: CPT | Performed by: OBSTETRICS & GYNECOLOGY

## 2022-04-01 PROCEDURE — 88307 TISSUE EXAM BY PATHOLOGIST: CPT

## 2022-04-01 PROCEDURE — 6360000002 HC RX W HCPCS: Performed by: NURSE ANESTHETIST, CERTIFIED REGISTERED

## 2022-04-01 PROCEDURE — 2500000003 HC RX 250 WO HCPCS: Performed by: NURSE ANESTHETIST, CERTIFIED REGISTERED

## 2022-04-01 PROCEDURE — 6370000000 HC RX 637 (ALT 250 FOR IP): Performed by: NURSE ANESTHETIST, CERTIFIED REGISTERED

## 2022-04-01 PROCEDURE — 81025 URINE PREGNANCY TEST: CPT

## 2022-04-01 PROCEDURE — 6370000000 HC RX 637 (ALT 250 FOR IP): Performed by: OBSTETRICS & GYNECOLOGY

## 2022-04-01 RX ORDER — SODIUM CHLORIDE 9 MG/ML
25 INJECTION, SOLUTION INTRAVENOUS PRN
Status: DISCONTINUED | OUTPATIENT
Start: 2022-04-01 | End: 2022-04-01 | Stop reason: HOSPADM

## 2022-04-01 RX ORDER — SODIUM CHLORIDE, SODIUM LACTATE, POTASSIUM CHLORIDE, CALCIUM CHLORIDE 600; 310; 30; 20 MG/100ML; MG/100ML; MG/100ML; MG/100ML
INJECTION, SOLUTION INTRAVENOUS CONTINUOUS
Status: DISCONTINUED | OUTPATIENT
Start: 2022-04-01 | End: 2022-04-01 | Stop reason: HOSPADM

## 2022-04-01 RX ORDER — LIDOCAINE HYDROCHLORIDE 10 MG/ML
INJECTION, SOLUTION EPIDURAL; INFILTRATION; INTRACAUDAL; PERINEURAL PRN
Status: DISCONTINUED | OUTPATIENT
Start: 2022-04-01 | End: 2022-04-01 | Stop reason: ALTCHOICE

## 2022-04-01 RX ORDER — SODIUM CHLORIDE, SODIUM LACTATE, POTASSIUM CHLORIDE, CALCIUM CHLORIDE 600; 310; 30; 20 MG/100ML; MG/100ML; MG/100ML; MG/100ML
INJECTION, SOLUTION INTRAVENOUS CONTINUOUS PRN
Status: DISCONTINUED | OUTPATIENT
Start: 2022-04-01 | End: 2022-04-01 | Stop reason: SDUPTHER

## 2022-04-01 RX ORDER — KETOROLAC TROMETHAMINE 10 MG/1
10 TABLET, FILM COATED ORAL EVERY 6 HOURS PRN
Qty: 10 TABLET | Refills: 0 | Status: SHIPPED | OUTPATIENT
Start: 2022-04-01 | End: 2023-04-01

## 2022-04-01 RX ORDER — OXYCODONE HYDROCHLORIDE 5 MG/1
10 TABLET ORAL PRN
Status: DISCONTINUED | OUTPATIENT
Start: 2022-04-01 | End: 2022-04-01 | Stop reason: HOSPADM

## 2022-04-01 RX ORDER — SODIUM CHLORIDE 9 MG/ML
INJECTION, SOLUTION INTRAVENOUS PRN
Status: DISCONTINUED | OUTPATIENT
Start: 2022-04-01 | End: 2022-04-01 | Stop reason: HOSPADM

## 2022-04-01 RX ORDER — FERRIC SUBSULFATE 20-22G/100
SOLUTION, NON-ORAL MISCELLANEOUS PRN
Status: DISCONTINUED | OUTPATIENT
Start: 2022-04-01 | End: 2022-04-01 | Stop reason: ALTCHOICE

## 2022-04-01 RX ORDER — SODIUM CHLORIDE 0.9 % (FLUSH) 0.9 %
5-40 SYRINGE (ML) INJECTION PRN
Status: DISCONTINUED | OUTPATIENT
Start: 2022-04-01 | End: 2022-04-01 | Stop reason: HOSPADM

## 2022-04-01 RX ORDER — SODIUM CHLORIDE 0.9 % (FLUSH) 0.9 %
5-40 SYRINGE (ML) INJECTION EVERY 12 HOURS SCHEDULED
Status: DISCONTINUED | OUTPATIENT
Start: 2022-04-01 | End: 2022-04-01 | Stop reason: HOSPADM

## 2022-04-01 RX ORDER — PROPOFOL 10 MG/ML
INJECTION, EMULSION INTRAVENOUS CONTINUOUS PRN
Status: DISCONTINUED | OUTPATIENT
Start: 2022-04-01 | End: 2022-04-01 | Stop reason: SDUPTHER

## 2022-04-01 RX ORDER — KETOROLAC TROMETHAMINE 30 MG/ML
INJECTION, SOLUTION INTRAMUSCULAR; INTRAVENOUS PRN
Status: DISCONTINUED | OUTPATIENT
Start: 2022-04-01 | End: 2022-04-01 | Stop reason: SDUPTHER

## 2022-04-01 RX ORDER — ONDANSETRON 2 MG/ML
INJECTION INTRAMUSCULAR; INTRAVENOUS PRN
Status: DISCONTINUED | OUTPATIENT
Start: 2022-04-01 | End: 2022-04-01 | Stop reason: SDUPTHER

## 2022-04-01 RX ORDER — DIMENHYDRINATE 50 MG/1
50 TABLET ORAL ONCE
Status: COMPLETED | OUTPATIENT
Start: 2022-04-01 | End: 2022-04-01

## 2022-04-01 RX ORDER — OXYCODONE HYDROCHLORIDE 5 MG/1
5 TABLET ORAL PRN
Status: DISCONTINUED | OUTPATIENT
Start: 2022-04-01 | End: 2022-04-01 | Stop reason: HOSPADM

## 2022-04-01 RX ORDER — FENTANYL CITRATE 50 UG/ML
25 INJECTION, SOLUTION INTRAMUSCULAR; INTRAVENOUS EVERY 5 MIN PRN
Status: DISCONTINUED | OUTPATIENT
Start: 2022-04-01 | End: 2022-04-01 | Stop reason: HOSPADM

## 2022-04-01 RX ORDER — ACETAMINOPHEN 325 MG/1
650 TABLET ORAL ONCE
Status: COMPLETED | OUTPATIENT
Start: 2022-04-01 | End: 2022-04-01

## 2022-04-01 RX ADMIN — SODIUM CHLORIDE, POTASSIUM CHLORIDE, SODIUM LACTATE AND CALCIUM CHLORIDE: 600; 310; 30; 20 INJECTION, SOLUTION INTRAVENOUS at 09:22

## 2022-04-01 RX ADMIN — KETOROLAC TROMETHAMINE 30 MG: 30 INJECTION, SOLUTION INTRAMUSCULAR at 11:06

## 2022-04-01 RX ADMIN — DIMENHYDRINATE 50 MG: 50 TABLET ORAL at 09:23

## 2022-04-01 RX ADMIN — ACETAMINOPHEN 650 MG: 325 TABLET ORAL at 09:23

## 2022-04-01 RX ADMIN — SODIUM CHLORIDE, POTASSIUM CHLORIDE, SODIUM LACTATE AND CALCIUM CHLORIDE: 600; 310; 30; 20 INJECTION, SOLUTION INTRAVENOUS at 10:44

## 2022-04-01 RX ADMIN — PROPOFOL 150 MCG/KG/MIN: 10 INJECTION, EMULSION INTRAVENOUS at 10:46

## 2022-04-01 RX ADMIN — ONDANSETRON 4 MG: 2 INJECTION INTRAMUSCULAR; INTRAVENOUS at 10:50

## 2022-04-01 RX ADMIN — LIDOCAINE HYDROCHLORIDE 80 MG: 20 INJECTION, SOLUTION EPIDURAL; INFILTRATION; INTRACAUDAL at 10:46

## 2022-04-01 ASSESSMENT — PAIN DESCRIPTION - LOCATION
LOCATION: PERINEUM
LOCATION: PERINEUM

## 2022-04-01 ASSESSMENT — PAIN SCALES - GENERAL
PAINLEVEL_OUTOF10: 2

## 2022-04-01 ASSESSMENT — PAIN DESCRIPTION - DESCRIPTORS
DESCRIPTORS: SORE

## 2022-04-01 ASSESSMENT — PAIN - FUNCTIONAL ASSESSMENT: PAIN_FUNCTIONAL_ASSESSMENT: 0-10

## 2022-04-01 NOTE — PROGRESS NOTES
Discharge instructions given to patient and boyfriend with understanding voiced.  Questions answered

## 2022-04-01 NOTE — H&P
HISTORY AND PHYSICAL             Date: 4/1/2022        Patient Name: Elmo Last     YOB: 1995      Age:  27 y.o. Chief Complaint   No chief complaint on file. History Obtained From   patient    History of Present Illness   Pt with persistent LGSIL/ASCUS +HPV    Past Medical History     Past Medical History:   Diagnosis Date    Acid reflux     Asthma         Past Surgical History   History reviewed. No pertinent surgical history. Medications Prior to Admission     Prior to Admission medications    Medication Sig Start Date End Date Taking? Authorizing Provider   montelukast (SINGULAIR) 10 MG tablet TAKE 1 TABLET BY MOUTH ONCE DAILY IN THE EVENING 11/3/21   FAUSTINA Lynn CNP   levonorgestrel-ethinyl estradiol (LYBREL) 90-20 MCG per tablet Take 1 tablet by mouth daily  Patient taking differently: Take 1 tablet by mouth nightly  11/3/21   FAUSTINA Garcia CNM   albuterol sulfate HFA (VENTOLIN HFA) 108 (90 Base) MCG/ACT inhaler Inhale 2 puffs into the lungs every 6 hours as needed for Wheezing or Shortness of Breath 5/5/21   FAUSTINA Lynn CNP   ADVAIR DISKUS 250-50 MCG/DOSE AEPB INHALE 1 DOSE BY MOUTH TWICE DAILY 5/3/21   FAUSTINA Lynn CNP   omeprazole (PRILOSEC) 10 MG delayed release capsule Take 10 mg by mouth nightly     Historical Provider, MD        Allergies   Patient has no known allergies.     Social History     Social History     Tobacco History     Smoking Status  Former Smoker Quit date  3/3/2020    Smokeless Tobacco Use  Never Used          Alcohol History     Alcohol Use Status  Yes Comment  rarely          Drug Use     Drug Use Status  Never          Sexual Activity     Sexually Active  Yes Partners  Male Birth Control/Protection  Pill                Family History     Family History   Problem Relation Age of Onset    Diabetes Father     Hypertension Father        Review of Systems   Review of Systems    Physical Exam   Ht 5' 2\" (1.575 m)   Wt 150 lb (68 kg)   Breastfeeding No Comment: Birth control  BMI 27.44 kg/m²     Physical Exam  Constitutional:       Appearance: Normal appearance. HENT:      Head: Normocephalic and atraumatic. Eyes:      Extraocular Movements: Extraocular movements intact. Pupils: Pupils are equal, round, and reactive to light. Cardiovascular:      Rate and Rhythm: Normal rate. Pulmonary:      Effort: Pulmonary effort is normal.   Musculoskeletal:         General: Normal range of motion. Cervical back: Normal range of motion. Skin:     General: Skin is warm and dry. Neurological:      Mental Status: She is alert and oriented to person, place, and time. Psychiatric:         Mood and Affect: Mood normal.         Behavior: Behavior normal.         Thought Content: Thought content normal.         Judgment: Judgment normal.         Labs    No results found for this or any previous visit (from the past 24 hour(s)). Imaging/Diagnostics Last 24 Hours   No results found. Assessment      Persistent LGSIL/ASCUS +HPV  Plan   1.  LEEP    Consultations Ordered:  None    Electronically signed by Katarina Resendez DO on 4/1/22 at 9:03 AM EDT

## 2022-04-01 NOTE — ANESTHESIA PRE PROCEDURE
Department of Anesthesiology  Preprocedure Note       Name:  Mira Gould   Age:  27 y.o.  :  1995                                          MRN:  [id number]         Date:  2022      Surgeon: Natalia Esquivel):  Marilyn Camp DO    Procedure: Procedure(s):  DILATATION AND CURETTAGE LEEP    Medications prior to admission:   Prior to Admission medications    Medication Sig Start Date End Date Taking?  Authorizing Provider   ketorolac (TORADOL) 10 MG tablet Take 1 tablet by mouth every 6 hours as needed for Pain 22 Yes Ant Martin PA-C   montelukast (SINGULAIR) 10 MG tablet TAKE 1 TABLET BY MOUTH ONCE DAILY IN THE EVENING 11/3/21   FAUSTINA Velazco CNP   levonorgestrel-ethinyl estradiol (LYBREL) 90-20 MCG per tablet Take 1 tablet by mouth daily  Patient taking differently: Take 1 tablet by mouth nightly  11/3/21   FAUSTINA Hoang CNM   albuterol sulfate HFA (VENTOLIN HFA) 108 (90 Base) MCG/ACT inhaler Inhale 2 puffs into the lungs every 6 hours as needed for Wheezing or Shortness of Breath 21   FAUSTINA Velazco CNP   ADVAIR DISKUS 250-50 MCG/DOSE AEPB INHALE 1 DOSE BY MOUTH TWICE DAILY 5/3/21   FAUSTINA Velazco CNP   omeprazole (PRILOSEC) 10 MG delayed release capsule Take 10 mg by mouth nightly     Historical Provider, MD       Current medications:    Current Facility-Administered Medications   Medication Dose Route Frequency Provider Last Rate Last Admin    lactated ringers infusion   IntraVENous Continuous Carlos Manuel FAUSTINA Hernandez CRNA 100 mL/hr at 22 0922 New Bag at 22 0922    sodium chloride flush 0.9 % injection 5-40 mL  5-40 mL IntraVENous 2 times per day Ant Martin PA-C        sodium chloride flush 0.9 % injection 5-40 mL  5-40 mL IntraVENous PRN Ant Martin PA-C        0.9 % sodium chloride infusion  25 mL IntraVENous PRN Ant Martin PA-C           Allergies:  No Known Allergies    Problem List: Patient Active Problem List   Diagnosis Code    Asthma J45.909    Normal delivery O80       Past Medical History:        Diagnosis Date    Acid reflux     Asthma        Past Surgical History:  History reviewed. No pertinent surgical history. Social History:    Social History     Tobacco Use    Smoking status: Former Smoker     Quit date: 3/3/2020     Years since quittin.0    Smokeless tobacco: Never Used   Substance Use Topics    Alcohol use: Yes     Comment: rarely                                Counseling given: Not Answered      Vital Signs (Current):   Vitals:    22 1130 22 0905 22 0919   BP:   116/66   Pulse:   82   Resp:   16   Temp:   36.6 °C (97.8 °F)   TempSrc:   Temporal   SpO2:   99%   Weight: 150 lb (68 kg) 158 lb (71.7 kg)    Height: 5' 2\" (1.575 m) 5' 2\" (1.575 m)                                               BP Readings from Last 3 Encounters:   22 116/66   22 122/80   21 118/74       NPO Status: Time of last liquid consumption:                         Time of last solid consumption:                         Date of last liquid consumption: 22                        Date of last solid food consumption: 22    BMI:   Wt Readings from Last 3 Encounters:   22 158 lb (71.7 kg)   22 162 lb 12.8 oz (73.8 kg)   21 161 lb (73 kg)     Body mass index is 28.9 kg/m².     CBC:   Lab Results   Component Value Date    WBC 7.6 2021    RBC 4.44 2021    HGB 14.0 2021    HCT 42.6 2021    MCV 95.9 2021    RDW 11.8 2021     2021       CMP:   Lab Results   Component Value Date     2021    K 4.3 2021     2021    CO2 24 2021    BUN 12 2021    CREATININE 0.74 2021    GFRAA >60 2021    LABGLOM >60 2021    GLUCOSE 107 2021    CALCIUM 9.9 2021       POC Tests: No results for input(s): POCGLU, POCNA, POCK, POCCL, POCBUN,

## 2022-04-01 NOTE — ANESTHESIA POSTPROCEDURE EVALUATION
Department of Anesthesiology  Postprocedure Note    Patient: Asa Lu  MRN: [id number]  YOB: 1995  Date of evaluation: 4/1/2022  Time:  1:51 PM     Procedure Summary     Date: 04/01/22 Room / Location: [address] [place] [address]    Anesthesia Start: 5168 Anesthesia Stop: 2025    Procedure: LEEP (N/A ) Diagnosis: (LGSIL)    Surgeons: Mckenzie Bryant DO Responsible Provider: FAUSTINA Pinedo CRNA    Anesthesia Type: general ASA Status: 2          Anesthesia Type: general    Edda Phase I:      Edda Phase II: Edda Score: 10    Last vitals: Reviewed and per EMR flowsheets.        Anesthesia Post Evaluation    Patient location during evaluation: PACU  Patient participation: complete - patient participated  Level of consciousness: awake and alert  Pain score: 0  Airway patency: patent  Nausea & Vomiting: no nausea and no vomiting  Complications: no  Cardiovascular status: hemodynamically stable  Respiratory status: acceptable  Hydration status: euvolemic

## 2022-04-01 NOTE — PROGRESS NOTES

## 2022-04-01 NOTE — OP NOTE
Preoperative diagnosis: Persistent SANTIAGO 1 and HPV    Postoperative diagnosis: Same    Procedure: LEEP with paracervical block    Surgeon: Dr. Tonia Jaimes    Assistant:  Vega Almonte PGY 3    Anesthesia: Gen. Estimated blood loss: Less than 10 mL    Fluids: Lactated Ringer's    Condition: Stable    Complications: None    Specimen: Cervix    Findings: There was decreased uptake noted around the os    Operative note: The patient was taken to the operating room where general anesthesia was obtained without difficulty. She was prepped and draped usual sterile fashion in a dorsal lithotomy position. An insulated speculum was placed and the cervix was bathed with Lugol's solution with the findings noted above. A small loop electrode was utilized to remove the anterior, followed by the posterior, lip of the cervix. A large roller ball was then used to obtain hemostasis and to ablate the base as well as the margins resected specimen. 10 mL of 1% lidocaine were then utilized for a paracervical block. Once hemostasis was assured the speculum was removed from the vagina. Sponge, lap, needle, and instrument counts were correct ×2. The patient was taken to the recovery area in apparently stable condition.

## 2022-04-05 LAB — SURGICAL PATHOLOGY REPORT: NORMAL

## 2022-04-12 ENCOUNTER — OFFICE VISIT (OUTPATIENT)
Dept: OBGYN | Age: 27
End: 2022-04-12

## 2022-04-12 VITALS
BODY MASS INDEX: 28.71 KG/M2 | HEIGHT: 62 IN | SYSTOLIC BLOOD PRESSURE: 106 MMHG | WEIGHT: 156 LBS | DIASTOLIC BLOOD PRESSURE: 68 MMHG

## 2022-04-12 DIAGNOSIS — Z09 POSTOPERATIVE EXAMINATION: Primary | ICD-10-CM

## 2022-04-12 PROCEDURE — 99024 POSTOP FOLLOW-UP VISIT: CPT | Performed by: OBSTETRICS & GYNECOLOGY

## 2022-04-12 NOTE — PROGRESS NOTES
Postop Progress Note    Subjective    Aurelia Sanders presents to the office for postop follow up. Chief Complaint   Patient presents with    Post-Op Check     Patient had a LEEP 4/1/22. She notes cramping and bleeding started on the 7th. She states that it has decreased but is still present. Objective    Vitals:    04/12/22 1310   BP: 106/68     General: alert, cooperative and no distress  Incision: healing well; monsels applied to friable cervix    Assessment  Doing well postoperatively. Plan  1. Continue any current medications  2. Wound care discussed  3. Pt is to increase activities as tolerated  4. Usual diet  5.  Follow up: as needed    Electronically signed by Oniel Valderrama DO on 4/12/2022 at 1:20 PM

## 2022-04-14 ENCOUNTER — PATIENT MESSAGE (OUTPATIENT)
Dept: FAMILY MEDICINE CLINIC | Age: 27
End: 2022-04-14

## 2022-04-14 DIAGNOSIS — Z00.00 WELLNESS EXAMINATION: Primary | ICD-10-CM

## 2022-04-18 NOTE — TELEPHONE ENCOUNTER
From: Aurelia Sanders  To: Yumiko Reddy  Sent: 4/14/2022 3:30 PM EDT  Subject: Wellness physical and blood work     Hello  I have an appointment with Dr. Cyndie Mckinney on May 3rd, I am needing blood work done for my employer insurance and was wondering if I could get orders for that done before my appointment? Attached is the paper work for my physical and blood work need for [employer].   Thank you   Theo Hernandez

## 2022-04-26 ENCOUNTER — HOSPITAL ENCOUNTER (OUTPATIENT)
Age: 27
Discharge: HOME | End: 2022-04-26
Payer: COMMERCIAL

## 2022-04-26 DIAGNOSIS — Z00.00 WELLNESS EXAMINATION: ICD-10-CM

## 2022-04-26 LAB
CHOLESTEROL/HDL RATIO: 4.3
CHOLESTEROL: 229 MG/DL
HDLC SERPL-MCNC: 53 MG/DL
LDL CHOLESTEROL: 147 MG/DL (ref 0–130)
TRIGL SERPL-MCNC: 147 MG/DL

## 2022-04-26 PROCEDURE — 36415 COLL VENOUS BLD VENIPUNCTURE: CPT

## 2022-04-26 PROCEDURE — 83036 HEMOGLOBIN GLYCOSYLATED A1C: CPT

## 2022-04-26 PROCEDURE — 80061 LIPID PANEL: CPT

## 2022-04-27 LAB
ESTIMATED AVERAGE GLUCOSE: 100 MG/DL
HBA1C MFR BLD: 5.1 % (ref 4–6)

## 2022-05-03 ENCOUNTER — OFFICE VISIT (OUTPATIENT)
Dept: FAMILY MEDICINE CLINIC | Age: 27
End: 2022-05-03
Payer: COMMERCIAL

## 2022-05-03 VITALS
HEART RATE: 78 BPM | BODY MASS INDEX: 28.34 KG/M2 | RESPIRATION RATE: 14 BRPM | DIASTOLIC BLOOD PRESSURE: 88 MMHG | OXYGEN SATURATION: 99 % | SYSTOLIC BLOOD PRESSURE: 126 MMHG | WEIGHT: 154 LBS | HEIGHT: 62 IN

## 2022-05-03 DIAGNOSIS — L73.9 FOLLICULITIS: ICD-10-CM

## 2022-05-03 DIAGNOSIS — J30.9 ALLERGIC RHINITIS, UNSPECIFIED SEASONALITY, UNSPECIFIED TRIGGER: ICD-10-CM

## 2022-05-03 DIAGNOSIS — E66.3 OVERWEIGHT: ICD-10-CM

## 2022-05-03 DIAGNOSIS — E78.5 HYPERLIPIDEMIA, UNSPECIFIED HYPERLIPIDEMIA TYPE: ICD-10-CM

## 2022-05-03 DIAGNOSIS — Z13.220 LIPID SCREENING: ICD-10-CM

## 2022-05-03 DIAGNOSIS — R73.09 ELEVATED GLUCOSE: ICD-10-CM

## 2022-05-03 DIAGNOSIS — Z00.00 WELLNESS EXAMINATION: Primary | ICD-10-CM

## 2022-05-03 PROCEDURE — 99395 PREV VISIT EST AGE 18-39: CPT | Performed by: NURSE PRACTITIONER

## 2022-05-03 PROCEDURE — 99213 OFFICE O/P EST LOW 20 MIN: CPT | Performed by: NURSE PRACTITIONER

## 2022-05-03 RX ORDER — OMEPRAZOLE 10 MG/1
10 CAPSULE, DELAYED RELEASE ORAL
Qty: 90 CAPSULE | Refills: 1 | Status: SHIPPED | OUTPATIENT
Start: 2022-05-03 | End: 2022-05-09 | Stop reason: DRUGHIGH

## 2022-05-03 RX ORDER — FLUTICASONE PROPIONATE 50 MCG
2 SPRAY, SUSPENSION (ML) NASAL DAILY
Qty: 16 G | Refills: 0 | Status: SHIPPED | OUTPATIENT
Start: 2022-05-03

## 2022-05-03 SDOH — ECONOMIC STABILITY: FOOD INSECURITY: WITHIN THE PAST 12 MONTHS, YOU WORRIED THAT YOUR FOOD WOULD RUN OUT BEFORE YOU GOT MONEY TO BUY MORE.: NEVER TRUE

## 2022-05-03 SDOH — ECONOMIC STABILITY: FOOD INSECURITY: WITHIN THE PAST 12 MONTHS, THE FOOD YOU BOUGHT JUST DIDN'T LAST AND YOU DIDN'T HAVE MONEY TO GET MORE.: NEVER TRUE

## 2022-05-03 ASSESSMENT — PATIENT HEALTH QUESTIONNAIRE - PHQ9
SUM OF ALL RESPONSES TO PHQ9 QUESTIONS 1 & 2: 0
2. FEELING DOWN, DEPRESSED OR HOPELESS: 0
SUM OF ALL RESPONSES TO PHQ QUESTIONS 1-9: 0
1. LITTLE INTEREST OR PLEASURE IN DOING THINGS: 0
SUM OF ALL RESPONSES TO PHQ QUESTIONS 1-9: 0

## 2022-05-03 ASSESSMENT — ENCOUNTER SYMPTOMS
RHINORRHEA: 1
SINUS PAIN: 0
WHEEZING: 1
SORE THROAT: 1
DIARRHEA: 0
COUGH: 1
ABDOMINAL PAIN: 0
SHORTNESS OF BREATH: 1
SINUS PRESSURE: 0
CONSTIPATION: 0

## 2022-05-03 ASSESSMENT — SOCIAL DETERMINANTS OF HEALTH (SDOH): HOW HARD IS IT FOR YOU TO PAY FOR THE VERY BASICS LIKE FOOD, HOUSING, MEDICAL CARE, AND HEATING?: NOT HARD AT ALL

## 2022-05-03 NOTE — PATIENT INSTRUCTIONS
Emollient (over the counter) - usually comes in a jar. Brands I recommend include CereVe, Cetaphil, Aquaphor. Apply to rash after shower. Mupirocin (antibiotic cream) - apply three times daily as needed to arms    Flonase - for allergies     SURVEY:    You may be receiving a survey from [employer] regarding your visit today. Please complete the survey to enable us to provide the highest quality of care to you and your family. If you cannot score us a very good on any question, please call the office to discuss how we could have made your experience a very good one. Thank you.

## 2022-05-03 NOTE — PROGRESS NOTES
Name: Aditya Chicas  : 1995         Chief Complaint:     Chief Complaint   Patient presents with    Allergies     states she is having seasonal allergies    Annual Exam     waist 37in. denies any concerns. History of Present Illness:      Aditya Chicas is a 27 y.o.  female who presents with Allergies (states she is having seasonal allergies) and Annual Exam (waist 37in. denies any concerns. )      HPI     Wellness:  Admits well balanced diet. She has recently cut out soda. She also monitors her sugar intake. She is limiting her fast food intake. 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 She is not vaccinated for covid. Most recent tetanus vaccine 2020. Pap smear 2022 LSIL. 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 Following with Tigist Moore CNM and Dr. Rakan Pereira for OBGYN. Admits routine eye exams annually. She is seeing a dentist every 6 months. Allergic Rhinitis:  Admits worsening symptoms within the last week. She believes it is related to tree pollen and grass. Symptoms include sore throat, nasal congestion, and feeling \"run down\". Her asthma has worsened with this, including cough. Admits chest pain with coughing, described as \"muscle pain\". Admits small amount of phlegm. She is taking Advair routinely. She is taking her albuterol PRN, twice yesterday with benefit. She is also taking Singulair 10mg QHS. She is taking claritin for her symptoms. Past Medical History:     Past Medical History:   Diagnosis Date    Acid reflux     Asthma       Reviewed all health maintenance requirements and ordered appropriate tests  There are no preventive care reminders to display for this patient.     Past Surgical History:     Past Surgical History:   Procedure Laterality Date    LEEP  2022    Dr. Jr Rico LEEP N/A 2022    [facility] DO [facility]        Medications: Prior to Admission medications    Medication Sig Start Date End Date Taking? Authorizing Provider   omeprazole (PRILOSEC) 10 MG delayed release capsule Take 1 capsule by mouth daily (before dinner) 5/3/22  Yes FAUSTINA Lynn CNP   mupirocin (BACTROBAN) 2 % ointment Apply topically 3 times daily. 5/3/22 5/10/22 Yes FAUSTINA Lynn CNP   fluticasone [facility]) 50 MCG/ACT nasal spray 2 sprays by Each Nostril route daily 5/3/22  Yes FAUSTINA Lynn CNP   montelukast (SINGULAIR) 10 MG tablet TAKE 1 TABLET BY MOUTH ONCE DAILY IN THE EVENING 11/3/21  Yes FAUSTINA Lynn CNP   levonorgestrel-ethinyl estradiol (LYBREL) 90-20 MCG per tablet Take 1 tablet by mouth daily  Patient taking differently: Take 1 tablet by mouth nightly  11/3/21  Yes FAUSTINA Garcia CNM   albuterol sulfate HFA (VENTOLIN HFA) 108 (90 Base) MCG/ACT inhaler Inhale 2 puffs into the lungs every 6 hours as needed for Wheezing or Shortness of Breath 5/5/21  Yes FAUSTINA Lynn CNP   ADVAIR DISKUS 250-50 MCG/DOSE AEPB INHALE 1 DOSE BY MOUTH TWICE DAILY 5/3/21  Yes FAUSTINA Lynn CNP   ketorolac (TORADOL) 10 MG tablet Take 1 tablet by mouth every 6 hours as needed for Pain  Patient not taking: Reported on 5/3/2022 4/1/22 4/1/23  Abad Pittman PA-C        Allergies:       Patient has no known allergies. Social History:     Tobacco:    reports that she quit smoking about 2 years ago. She has never used smokeless tobacco.  Alcohol:      reports current alcohol use. Drug Use:  reports no history of drug use. Family History:     Family History   Problem Relation Age of Onset    Diabetes Father     Hypertension Father        Review of Systems:     Positive and Negative as described in HPI    Review of Systems   Constitutional: Negative for chills, fatigue, fever and unexpected weight change. HENT: Positive for congestion, postnasal drip, rhinorrhea and sore throat.  Negative for sinus pressure and sinus pain. Eyes: Negative for visual disturbance. Respiratory: Positive for cough, shortness of breath (within the last 4 days) and wheezing (within the last 4 days). Cardiovascular: Negative for chest pain and palpitations. Gastrointestinal: Negative for abdominal pain, constipation and diarrhea. Genitourinary: Negative for difficulty urinating. Musculoskeletal: Negative for arthralgias, joint swelling and myalgias. Skin: Positive for rash (Admtis rash on her arms bilaterally, \"little red bumps\". Denies itching or pain. Present since 3rd trimester pregnancy). Neurological: Negative for dizziness, light-headedness and headaches. Physical Exam:   Vitals:  /88   Pulse 78   Resp 14   Ht 5' 2\" (1.575 m)   Wt 154 lb (69.9 kg)   SpO2 99%   BMI 28.17 kg/m²     Physical Exam  Constitutional:       General: She is not in acute distress. Appearance: Normal appearance. She is normal weight. She is not ill-appearing or toxic-appearing. HENT:      Head: Normocephalic. Right Ear: Tympanic membrane, ear canal and external ear normal. There is no impacted cerumen. Left Ear: Tympanic membrane, ear canal and external ear normal. There is no impacted cerumen. Nose: Nose normal. No congestion or rhinorrhea. Mouth/Throat:      Mouth: Mucous membranes are moist.      Pharynx: No oropharyngeal exudate or posterior oropharyngeal erythema. Cardiovascular:      Rate and Rhythm: Normal rate and regular rhythm. Heart sounds: Normal heart sounds. No murmur heard. Pulmonary:      Effort: Pulmonary effort is normal. No respiratory distress. Breath sounds: Normal breath sounds. No stridor. No wheezing, rhonchi or rales. Abdominal:      General: Abdomen is flat. Bowel sounds are normal. There is no distension. Palpations: Abdomen is soft. There is no mass. Tenderness: There is no abdominal tenderness. There is no guarding.       Hernia: No hernia is present. Musculoskeletal:      Cervical back: Neck supple. Lymphadenopathy:      Cervical: No cervical adenopathy. Skin:     General: Skin is warm. Comments: Erythematous, hyperkeratotic scattered papules and pustules located posterior aspects of proximal arms bilaterally   Neurological:      Mental Status: She is alert and oriented to person, place, and time. Psychiatric:         Mood and Affect: Mood normal.         Behavior: Behavior normal.         Thought Content: Thought content normal.         Judgment: Judgment normal.         Data:     Lab Results   Component Value Date     05/11/2021    K 4.3 05/11/2021     05/11/2021    CO2 24 05/11/2021    BUN 12 05/11/2021    CREATININE 0.74 05/11/2021    GLUCOSE 107 05/11/2021     Lab Results   Component Value Date    WBC 7.6 05/11/2021    RBC 4.44 05/11/2021    HGB 14.0 05/11/2021    HCT 42.6 05/11/2021    MCV 95.9 05/11/2021    MCH 31.5 05/11/2021    MCHC 32.9 05/11/2021    RDW 11.8 05/11/2021     05/11/2021    MPV 8.6 05/11/2021     Lab Results   Component Value Date    TSH 1.32 05/11/2021     Lab Results   Component Value Date    CHOL 229 04/26/2022    HDL 53 04/26/2022    LABA1C 5.1 04/26/2022       Assessment/Plan:      Diagnosis Orders   1. Wellness examination  ALT    AST    Basic Metabolic Panel    CBC    Lipid Panel    TSH With Reflex Ft4    Hemoglobin A1C   2. Overweight  [facility] [place]    TSH With Reflex Ft4   3. Hyperlipidemia, unspecified hyperlipidemia type  [place] [address]   4. Lipid screening  Lipid Panel   5.  Elevated glucose  Hemoglobin A1C       Wellness:  - Counseled on well-balanced diet and routine physical activity  - Encourage routine eye and dental appointments  - UTD tetanus vaccine  - Recommended COVID and annual influenza vaccines  - Abnormal Pap smear, being followed by Dr. Jacinto Kebede OB/GYN    Hyperlipidemia:  - Reviewed blood work from 2022. Cholesterol elevated 229,   - Counseled on low-cholesterol foods and routine physical activity  - Referral to dietitian placed today, patient agreeable. She is also concerned regarding her inability to lose weight.  2021 TSH WNL    Allergic rhinitis:  - Worsening her asthma symptoms. Stable physical exam.  Continue Advair routinely and albuterol as needed  - Continue Claritin daily  - Continue Singulair 10 mg nightly  - Initiate Flonase, rx ordered    Folliculitis:  - Topical mupirocin 3 times daily  - Emollients after the shower    Completed Refills   Requested Prescriptions     Signed Prescriptions Disp Refills    omeprazole (PRILOSEC) 10 MG delayed release capsule 90 capsule 1     Sig: Take 1 capsule by mouth daily (before dinner)    mupirocin (BACTROBAN) 2 % ointment 1 each 0     Sig: Apply topically 3 times daily.     fluticasone (FLONASE) 50 MCG/ACT nasal spray 16 g 0     Si sprays by Each Nostril route daily       Orders Placed This Encounter   Procedures    ALT     Standing Status:   Future     Standing Expiration Date:   5/3/2024    AST     Standing Status:   Future     Standing Expiration Date:   5/3/2024    Basic Metabolic Panel     Standing Status:   Future     Standing Expiration Date:   5/3/2024    CBC     Standing Status:   Future     Standing Expiration Date:   5/3/2024    Lipid Panel     Standing Status:   Future     Standing Expiration Date:   5/3/2024     Order Specific Question:   Is Patient Fasting?/# of Hours     Answer:   fasting    TSH With Reflex Ft4     Standing Status:   Future     Standing Expiration Date:   5/3/2024    Hemoglobin A1C     Standing Status:   Future     Standing Expiration Date:   5/3/2024   [facility] [place]     Referral Priority:   Routine     Referral Type:   Eval and Treat     Referral Reason:   Specialty Services Required     Requested Specialty:   Nutrition     Number of Visits Requested:   1        No results found for this visit on 05/03/22. Return in about 1 year (around 5/3/2023), or if symptoms worsen or fail to improve, for wellness labs prior.     Electronically signed by FAUSTINA Langston CNP on 05/04/22 at 7:58 AM.

## 2022-05-09 RX ORDER — OMEPRAZOLE 20 MG/1
20 CAPSULE, DELAYED RELEASE ORAL DAILY
Qty: 90 CAPSULE | Refills: 1 | Status: SHIPPED | OUTPATIENT
Start: 2022-05-09

## 2022-06-17 ENCOUNTER — HOSPITAL ENCOUNTER (OUTPATIENT)
Dept: NUTRITION | Age: 27
Discharge: HOME | End: 2022-06-17
Payer: COMMERCIAL

## 2022-06-17 VITALS — BODY MASS INDEX: 28.04 KG/M2 | HEIGHT: 62 IN | WEIGHT: 152.38 LBS

## 2022-06-17 PROCEDURE — 97802 MEDICAL NUTRITION INDIV IN: CPT

## 2022-06-17 NOTE — PROGRESS NOTES
MNT provided for Overweight and HLD    Food/nutrition knowledge deficit  related to Lack of previous MNT/currently undergoing MNT as evidenced by Lab values:   Lab Results   Component Value Date    TRIG 147 04/26/2022    HDL 53 04/26/2022   Cholesterol 229      Lab Results   Component Value Date    LABA1C 5.1 04/26/2022     Client data    Ht: 5'2\" Wt: 152# 6 oz BMI: 27.87 (overweight)   Weight changes: 6# weight loss CBW: 69.1 kg   BMR: 1378 calories Est. total calorie needs: ~1798-6355       Client overall goal for weight is for weight loss trend towards a healthier BMI. Current eating pattern is with errors in meal timing. Use of whole grains, whole fruits and vegetables appear less than recommended quantities. There is an excess of calories, carbohydrates, and fats per recall (fast food, red meats, cookies, belvita biscuit, chicken nuggets, hamburgers). Rahul Jones has switched from beef to ground turkey, switched to whole grain breads, drinks water, stevia sweetened beverages, consumes 1-2 servings of fruit daily, 1-2 servings vegetables, and incorporates fish once weekly. Activity is moderate at work, but no planned PA. Rahul Jones reports elevated glucose during pregnancy, stated they did not Dx her with GDM. Client presents for MNT today with herself. Client was educated on carbohydrate counting with the following goals at meals and snacks:  Breakfast: 45 grams  Lunch: 45 grams  Supper: 45 grams  Bedtime Snack: 15 grams    Client received information on limiting fat in diet to lessen heart disease risk, with goals of:   Total Fat: 49 grams  Saturated Fat: 10 grams  Trans Fat: 0 grams daily     Discussed and provided literature on:    Reading food labels, basic carbohydrate counting for weight loss, importance of consistency of meal timing (eating meals every 4.5-5 hours), importance of carbohydrate consistency (carbohydrate recommendations as noted above), importance of physical activity with a minimum goal of 30 minutes 5 days per week, healthy snack options (fruit, vegetables, lite popcorn, wheat thins, etc.), plate method (half of 9\" plate with non starchy vegetables such as broccoli or cauliflower, with protein item such as chicken and ,starch option such as a potato sharing a quarter of the plate each) importance of consuming protein and carbohydrate foods together (for meal and snack satiety), cooking methods (baking broiling, grilling), importance of eating meals slowly, include foods high in soluble fiber such as beans/legumes, fruits, vegetables, and whole grains, and importance of not eating in front of a TV or computer were discussed with Nehal. Claire Dubose was encouraged to call with any questions. Nehal received the following diet instruction materials:  Choose Your Foods,  Food Label,  [address] [place], 1200 calorie meal plan for 7 days, and a refrigerator paper. Client goals:    1. Eat 3 well balanced meals with a variety of lean meats, fresh fruits, vegetables, whole grains and low fat dairy at consistent times, not going over 5 hours without a meal and having breakfast within 1 hour of rising. 2. Include a minimum of 150 minutes of planned physical activity weekly. 3. Increase sources of soluble fiber in daily diet. 4. Choose baked options when dining out and get all toppings, dressings, etc. On the side. 5. Consume 3 servings of fruits and 2 servings of non starchy vegetables combined daily. Expected compliance:  good  Client appears to be in a contemplative phase of change. Recommend follow up as needed. RD name and phone number provided. Thank you for the referral.    Electronically signed by Ryan Hsieh RD, LD on 6/17/2022 at 4:55 PM    Education session duration: 50 minutes.

## 2022-10-06 ENCOUNTER — INITIAL PRENATAL (OUTPATIENT)
Dept: OBGYN | Age: 27
End: 2022-10-06

## 2022-10-06 ENCOUNTER — HOSPITAL ENCOUNTER (OUTPATIENT)
Age: 27
Setting detail: SPECIMEN
Discharge: HOME | End: 2022-10-06
Payer: COMMERCIAL

## 2022-10-06 ENCOUNTER — ANCILLARY PROCEDURE (OUTPATIENT)
Dept: OBGYN | Age: 27
End: 2022-10-06
Payer: COMMERCIAL

## 2022-10-06 VITALS — SYSTOLIC BLOOD PRESSURE: 106 MMHG | DIASTOLIC BLOOD PRESSURE: 70 MMHG | BODY MASS INDEX: 28.35 KG/M2 | WEIGHT: 155 LBS

## 2022-10-06 DIAGNOSIS — Z32.01 POSITIVE PREGNANCY TEST: ICD-10-CM

## 2022-10-06 DIAGNOSIS — O36.80X0 ENCOUNTER TO DETERMINE FETAL VIABILITY OF PREGNANCY, SINGLE OR UNSPECIFIED FETUS: ICD-10-CM

## 2022-10-06 DIAGNOSIS — Z34.81 ENCOUNTER FOR SUPERVISION OF OTHER NORMAL PREGNANCY IN FIRST TRIMESTER: Primary | ICD-10-CM

## 2022-10-06 DIAGNOSIS — Z32.01 POSITIVE URINE PREGNANCY TEST: ICD-10-CM

## 2022-10-06 DIAGNOSIS — Z34.81 ENCOUNTER FOR SUPERVISION OF OTHER NORMAL PREGNANCY IN FIRST TRIMESTER: ICD-10-CM

## 2022-10-06 DIAGNOSIS — N91.2 AMENORRHEA: ICD-10-CM

## 2022-10-06 LAB
ABO/RH: NORMAL
AMPHETAMINE SCREEN URINE: NEGATIVE
ANTIBODY SCREEN: NEGATIVE
BARBITURATE SCREEN URINE: NEGATIVE
BENZODIAZEPINE SCREEN, URINE: NEGATIVE
BUPRENORPHINE URINE: NEGATIVE
CANNABINOID SCREEN URINE: NEGATIVE
COCAINE METABOLITE, URINE: NEGATIVE
CONTROL: PRESENT
HIV AG/AB: NONREACTIVE
METHADONE SCREEN, URINE: NEGATIVE
METHAMPHETAMINE, URINE: NEGATIVE
OPIATES, URINE: NEGATIVE
OXYCODONE SCREEN URINE: NEGATIVE
PHENCYCLIDINE, URINE: NEGATIVE
PREGNANCY TEST URINE, POC: POSITIVE
PROPOXYPHENE, URINE: NEGATIVE
TRICYCLIC ANTIDEPRESSANTS, UR: NEGATIVE

## 2022-10-06 PROCEDURE — 87340 HEPATITIS B SURFACE AG IA: CPT

## 2022-10-06 PROCEDURE — 87591 N.GONORRHOEAE DNA AMP PROB: CPT

## 2022-10-06 PROCEDURE — 86780 TREPONEMA PALLIDUM: CPT

## 2022-10-06 PROCEDURE — 86803 HEPATITIS C AB TEST: CPT

## 2022-10-06 PROCEDURE — 85025 COMPLETE CBC W/AUTO DIFF WBC: CPT

## 2022-10-06 PROCEDURE — 86900 BLOOD TYPING SEROLOGIC ABO: CPT

## 2022-10-06 PROCEDURE — 86901 BLOOD TYPING SEROLOGIC RH(D): CPT

## 2022-10-06 PROCEDURE — 80306 DRUG TEST PRSMV INSTRMNT: CPT

## 2022-10-06 PROCEDURE — 87389 HIV-1 AG W/HIV-1&-2 AB AG IA: CPT

## 2022-10-06 PROCEDURE — 0500F INITIAL PRENATAL CARE VISIT: CPT | Performed by: ADVANCED PRACTICE MIDWIFE

## 2022-10-06 PROCEDURE — 87491 CHLMYD TRACH DNA AMP PROBE: CPT

## 2022-10-06 PROCEDURE — 87086 URINE CULTURE/COLONY COUNT: CPT

## 2022-10-06 PROCEDURE — 86762 RUBELLA ANTIBODY: CPT

## 2022-10-06 PROCEDURE — 86850 RBC ANTIBODY SCREEN: CPT

## 2022-10-06 PROCEDURE — 76801 OB US < 14 WKS SINGLE FETUS: CPT | Performed by: OBSTETRICS & GYNECOLOGY

## 2022-10-06 NOTE — PROGRESS NOTES
New OB Visit    Date of service: 10/6/2022    Joycelyn Mena  Is a 27 y.o. single female presenting for a New OB visit with Nurse. Name of Father of Vinay Guzman is Victoriano Cheung and is involved. Pt does work at [employer]. Pt is not Fertility pt. PT's PCP is: FAUSTINA Lemos - CNP     : 1995                                             Subjective:       Patient's last menstrual period was 2022. OB History    Para Term  AB Living   2 1 1     1   SAB IAB Ectopic Molar Multiple Live Births           0 1      # Outcome Date GA Lbr Phillip/2nd Weight Sex Delivery Anes PTL Lv   2 Current            1 Term 10/18/20 39w0d / 00:25 6 lb 2 oz (2.778 kg) M Vag-Spont Local N ZENON      Complications: Precipitous Labor             Social History     Tobacco Use   Smoking Status Former    Types: Cigarettes    Quit date: 3/3/2020    Years since quittin.5   Smokeless Tobacco Never        Social History     Substance and Sexual Activity   Alcohol Use Not Currently        Allergies: Patient has no known allergies.       Current Outpatient Medications:     Prenatal MV-Min-Fe Fum-FA-DHA (PRENATAL 1 PO), Take by mouth, Disp: , Rfl:     omeprazole (PRILOSEC) 20 MG delayed release capsule, Take 1 capsule by mouth daily, Disp: 90 capsule, Rfl: 1    montelukast (SINGULAIR) 10 MG tablet, TAKE 1 TABLET BY MOUTH ONCE DAILY IN THE EVENING, Disp: 90 tablet, Rfl: 3    fluticasone (FLONASE) 50 MCG/ACT nasal spray, 2 sprays by Each Nostril route daily (Patient not taking: Reported on 10/6/2022), Disp: 16 g, Rfl: 0    ketorolac (TORADOL) 10 MG tablet, Take 1 tablet by mouth every 6 hours as needed for Pain, Disp: 10 tablet, Rfl: 0    levonorgestrel-ethinyl estradiol (LYBREL) 90-20 MCG per tablet, Take 1 tablet by mouth daily (Patient taking differently: Take 1 tablet by mouth nightly), Disp: 28 tablet, Rfl: 12    albuterol sulfate HFA (VENTOLIN HFA) 108 (90 Base) MCG/ACT inhaler, Inhale 2 puffs into the lungs every 6 hours as needed for Wheezing or Shortness of Breath (Patient not taking: Reported on 10/6/2022), Disp: 3 Inhaler, Rfl: 1    ADVAIR DISKUS 250-50 MCG/DOSE AEPB, INHALE 1 DOSE BY MOUTH TWICE DAILY (Patient not taking: Reported on 10/6/2022), Disp: 60 each, Rfl: 1      Vital Signs Last menstrual period 2022, not currently breastfeeding. No results found for this visit on 10/06/22. Pain: none                            Nausea: yes      Vomiting: no        Breast enlargement or tenderness: no    Frequency of urination:yes      Fatigue: yes         Patient history reviewed. Educational materials given and genetic testing reviewed. Breastfeeding handouts given and reviewed: Yes     If BMI over 35 was HgA1C drawn: N/A      If history of thyroid problem was TSH drawn: N/A       My chart set up and activated: Yes    If history of preeclampsia did we start baby ASA: N/A    If history of  delivery - did we set up progesterone injections:  N/A    Does pt have a history of DVT? no  If yes start Lovenox 40 mg daily    Is pt allergic to PCN? No:   If yes order U/A to culture and sensitivity if positive. Education:  There are no Patient Instructions on file for this visit. Assessment:      Diagnosis Orders   1. Encounter for supervision of other normal pregnancy in first trimester        2. Amenorrhea        3.  Positive urine pregnancy test            Plan: Order Routine Prenatal Lab Yes     Order additional testing if requested         Order Prenatal Vitamins   No: OTC             Appointment with Kamini Moreno CNM in 4 weeks for Dating U/S and total body exam if indicated      Nurse:   Qasim Johnston

## 2022-10-06 NOTE — PATIENT INSTRUCTIONS
Patient Education        Learning About Pregnancy  Your Care Instructions     Your health in the early weeks of your pregnancy is particularly important for your baby's health. Take good care of yourself. Anything you do that harms your body can also harm your baby. Make sure to go to all of your doctor appointments. Regular checkups will help keep you and your baby healthy. How can you care for yourself at home? Diet    Eat a balanced diet. Make sure your diet includes plenty of beans, peas, and leafy green vegetables. Do not skip meals or go for many hours without eating. If you are nauseated, try to eat a small, healthy snack every 2 to 3 hours. Do not eat fish that has a high level of mercury, such as shark, swordfish, or mackerel. Do not eat more than one can of tuna each week. Drink plenty of fluids. If you have kidney, heart, or liver disease and have to limit fluids, talk with your doctor before you increase the amount of fluids you drink. Cut down on caffeine, such as coffee, tea, and cola. Do not drink alcohol, such as beer, wine, or hard liquor. Take a multivitamin that contains at least 400 micrograms (mcg) of folic acid to help prevent birth defects. Fortified cereal and whole wheat bread are good additional sources of folic acid. Increase the calcium in your diet. Try to drink a quart of skim milk each day. You may also take calcium supplements and choose foods such as cheese and yogurt. Lifestyle    Make sure you go to your follow-up appointments. Get plenty of rest. You may be unusually tired while you are pregnant. Get at least 30 minutes of exercise on most days of the week. Walking is a good choice. If you have not exercised in the past, start out slowly. Take several short walks each day. Do not smoke. If you need help quitting, talk to your doctor about stop-smoking programs. These can increase your chances of quitting for good.      Do not touch cat feces or litter boxes. Also, wash your hands after you handle raw meat, and fully cook all meat before you eat it. Wear gloves when you work in the yard or garden, and wash your hands well when you are done. Cat feces, raw or undercooked meat, and contaminated dirt can cause an infection that may harm your baby or lead to a miscarriage. Avoid things that can make your body too hot and may be harmful to your baby, such as a hot tub or sauna. Or talk with your doctor before doing anything that raises your body temperature. Your doctor can tell you if it's safe. Avoid chemical fumes, paint fumes, or poisons. Do not use illegal drugs, marijuana, or alcohol. Medicines    Review all of your medicines with your doctor. Some of your routine medicines may need to be changed to protect your baby. Use acetaminophen (Tylenol) to relieve minor problems, such as a mild headache or backache or a mild fever with cold symptoms. Do not use nonsteroidal anti-inflammatory drugs (NSAIDs), such as ibuprofen (Advil, Motrin) or naproxen (Aleve), unless your doctor says it is okay. Do not take two or more pain medicines at the same time unless the doctor told you to. Many pain medicines have acetaminophen, which is Tylenol. Too much acetaminophen (Tylenol) can be harmful. Take your medicines exactly as prescribed. Call your doctor if you think you are having a problem with your medicine. To manage morning sickness    If you feel sick when you first wake up, try eating a small snack (such as crackers) before you get out of bed. Allow some time to digest the snack, and then get out of bed slowly. Do not skip meals or go for long periods without eating. An empty stomach can make nausea worse. Eat small, frequent meals instead of three large meals each day. Drink plenty of fluids. Eat foods that are high in protein but low in fat.      If you are taking iron supplements, ask your doctor if they are necessary. Iron can make nausea worse. Avoid any smells, such as coffee, that make you feel sick. Get lots of rest. Morning sickness may be worse when you are tired. Follow-up care is a key part of your treatment and safety. Be sure to make and go to all appointments, and call your doctor if you are having problems. It's also a good idea to know your test results and keep a list of the medicines you take. Where can you learn more? Go to https://WhoKnowspeCredit Coach.Club W. org and sign in to your Reichhold account. Enter V421 in the [employer] box to learn more about \"Learning About Pregnancy. \"     If you do not have an account, please click on the \"Sign Up Now\" link. Current as of: February 23, 2022               Content Version: 13.4  © 2006-2022 Healthwise, Hang[employer]. Care instructions adapted under license by [facility] ([facility]). If you have questions about a medical condition or this instruction, always ask your healthcare professional. [place] [address] any warranty or liability for your use of this information. Patient Education        Managing Morning Sickness: Care Instructions  Overview     Morning sickness can be the toughest part of early pregnancy. Some people feel mildly sick to their stomach, and others are running to the bathroom. The good news? Morning sickness usually gets better in the second trimester. It's likely that your hormones are to blame for morning sickness. But you can do things to feel better, like changing what you eat, avoiding certain foods and smells, and asking your doctor about medicines you can try. Follow-up care is a key part of your treatment and safety. Be sure to make and go to all appointments, and call your doctor if you are having problems. It's also a good idea to know your test results and keep a list of the medicines you take. How can you care for yourself at home? Keep food in your stomach, but not too much at once. Your nausea may be worse if your stomach is empty. Eat five or six small meals a day instead of three large meals. For morning nausea, eat a small snack, such as a couple of crackers or dry biscuits, before rising. Allow a few minutes for your stomach to settle before you get out of bed slowly. Drink plenty of fluids. If you have kidney, heart, or liver disease and have to limit fluids, talk with your doctor before you increase the amount of fluids you drink. Some women find that peppermint tea helps with nausea. Eat more protein, such as chicken, fish, lean meat, beans, nuts, and seeds. Eat carbohydrate foods, such as potatoes, whole-grain cereals, rice, and pasta. Avoid smells and foods that make you feel nauseated. Spicy or high-fat foods, citrus juice, milk, coffee, and tea with caffeine often make nausea worse. Do not drink alcohol. Do not smoke. Try not to be around others who smoke. If you need help quitting, talk to your doctor about stop-smoking programs and medicines. These can increase your chances of quitting for good. If you are taking iron supplements, ask your doctor if they are necessary. Iron can make nausea worse. Get lots of rest. Stress and fatigue can make your morning sickness worse. Ask your doctor about taking prescription medicine, or over-the-counter products such as vitamin B6, doxylamine, or lan, to relieve your symptoms. Your doctor can tell you the doses that are safe for you. Take your prenatal vitamins at night on a full stomach. When should you call for help? Call 911 anytime you think you may need emergency care. For example, call if:    You passed out (lost consciousness). Call your doctor now or seek immediate medical care if:    You are sick to your stomach or cannot drink fluids. You have symptoms of dehydration, such as:  Dry eyes and a dry mouth. Passing only a little urine. Feeling thirstier than usual.     You are not able to keep down your medicine. You have pain in your belly or pelvis. Watch closely for changes in your health, and be sure to contact your doctor if:    You do not get better as expected. Where can you learn more? Go to https://chmary.The Infatuation. org and sign in to your Vennli account. Enter K271 in the [facility] box to learn more about \"Managing Morning Sickness: Care Instructions. \"     If you do not have an account, please click on the \"Sign Up Now\" link. Current as of: February 23, 2022               Content Version: 13.4  © 8940-7584 [employer]. Care instructions adapted under license by [facility] ([facility]). If you have questions about a medical condition or this instruction, always ask your healthcare professional. [address] any warranty or liability for your use of this information. Patient Education        Nutrition During Pregnancy: Care Instructions  Overview     Healthy eating when you are pregnant is important for you and your baby. It can help you feel well and have a successful pregnancy and delivery. During pregnancy your nutrition needs increase. Even if you have excellent eating habits, your doctor may recommend a multivitamin to make sure you get enough iron and folic acid. You may wonder how much weight you should gain. In general, if you were at a healthy weight before you became pregnant, then you should gain between 25 and 35 pounds. If you were overweight before pregnancy, then you'll likely be advised to gain 15 to 25 pounds. If you were underweight before pregnancy, then you'll probably be advised to gain 28 to 40 pounds. Your doctor will work with you to set a weight goal that is right for you. Gaining a healthy amount of weight helps you have a healthy baby. Follow-up care is a key part of your treatment and safety. Be sure to make and go to all appointments, and call your doctor if you are having problems.  It's also a good idea to know your test results and keep a list of the medicines you take. How can you care for yourself at home? Eat plenty of fruits and vegetables. Include a variety of orange, yellow, and leafy dark-green vegetables every day. Choose whole-grain bread, cereal, and pasta. Good choices include whole wheat bread, whole wheat pasta, brown rice, and oatmeal.  Get 4 or more servings of milk and milk products each day. Good choices include nonfat or low-fat milk, yogurt, and cheese. If you cannot eat milk products, you can get calcium from calcium-fortified products such as orange juice, soy milk, and tofu. Other non-milk sources of calcium include leafy green vegetables, such as broccoli, kale, mustard greens, turnip greens, bok karla, and brussels sprouts. If you eat meat, pick lower-fat types. Good choices include lean cuts of meat and chicken or turkey without the skin. Do not eat shark, swordfish, kev mackerel, or tilefish. They have high levels of mercury, which is dangerous to your baby. You can eat up to 12 ounces a week of fish or shellfish that have low mercury levels. Good choices include shrimp, wild salmon, pollock, and catfish. Limit some other types of fish, such as white (albacore) tuna, to 4 oz (0.1 kg) a week. Heat lunch meats (such as turkey, ham, or bologna) to 165°F before you eat them. This reduces your risk of getting sick from a kind of bacteria that can be found in lunch meats. Do not eat unpasteurized soft cheeses, such as brie, feta, fresh mozzarella, and blue cheese. They have a bacteria that could harm your baby. Limit caffeine. If you drink coffee or tea, have no more than 1 cup a day. Caffeine is also found in kal. Do not drink any alcohol. No amount of alcohol has been found to be safe during pregnancy. Do not diet or try to lose weight. For example, do not follow a low-carbohydrate diet.  If you are overweight at the start of your pregnancy, your doctor will work with you to manage your weight gain.  Tell your doctor about all vitamins and supplements you take. When should you call for help? Watch closely for changes in your health, and be sure to contact your doctor if you have any problems. Where can you learn more? Go to https://mehdi.GoSurf Accessories. org and sign in to your Avatar Reality account. Enter Y785 in the [facility] box to learn more about \"Nutrition During Pregnancy: Care Instructions. \"     If you do not have an account, please click on the \"Sign Up Now\" link. Current as of: May 9, 2022               Content Version: 13.4  © 2006-2022 Well[employer]. Care instructions adapted under license by [facility] ([facility]). If you have questions about a medical condition or this instruction, always ask your healthcare professional. [address] any warranty or liability for your use of this information. Patient Education        Weeks 6 to 10 of Your Pregnancy: Care Instructions  Your baby is growing very quickly. You may start to feel different, both in your body and your emotions. Each pregnancy is different, so there's no \"right\" way to feel. These early weeks are a time to make healthy choices for you and your baby. Take a daily prenatal vitamin. Choose one with folic acid in it. Avoid alcohol, tobacco, and drugs (including marijuana). They can harm your baby. Drink plenty of liquids. Be sure to drink enough water. And limit sodas, other sweetened drinks, and caffeine. Choose foods that are good sources of calcium, iron, and folate. You can try dark leafy greens, fortified orange juice and cereals, almonds, broccoli, dried fruit, and beans. Avoid foods that may harm your baby. Don't eat raw meat, deli meat, raw seafood, or raw eggs. Avoid soft cheese and unpasteurized dairy, like Brie and blue cheese. And don't eat fish that contains a lot of mercury, like shark and swordfish. Don't touch lidia litter or cat poop.   They can cause an infection that could harm your baby. Avoid things that can make your body too hot. For example, avoid hot tubs and saunas. Soothe morning sickness. Try eating 5 or 6 small meals a day, getting some fresh air, or using lan to control symptoms. Follow-up care is a key part of your treatment and safety. Be sure to make and go to all appointments, and call your doctor if you are having problems. It's also a good idea to know your test results and keep a list of the medicines you take. Where can you learn more? Go to https://Skypepepiceweb.Speak With Me. org and sign in to your eVendor Check account. Enter G112 in the [facility] box to learn more about \"Weeks 6 to 10 of Your Pregnancy: Care Instructions. \"     If you do not have an account, please click on the \"Sign Up Now\" link. Current as of: February 23, 2022               Content Version: 13.4  © 2006-2022 [employer]. Care instructions adapted under license by [facility] ([facility]). If you have questions about a medical condition or this instruction, always ask your healthcare professional. [place] [address] any warranty or liability for your use of this information. Childbirth Education 2022 March 30 Wednesday and April 7 Thursday 530 to 9 PM    May 14 Saturday 9am -to 4 PM    June 14 and 21 Tuesdays 5:30 PM to 9 PM    July 23 Saturday 9 AM to 4 PM    August 9 and 16 Tuesdays 5:30 PM to 9 PM    September 14 and 21 Wednesdays 5:30 PM to 9 PM    October 15 Saturday 9 AM to 4 PM    November 9 and 16 Wednesday 5:30 PM to 9 PM      There is no charge for classes. Please bring a pillow to class. Bring a support person.     To sign up for classes  Call the Obstetrics Department at  [facility] at  [phone]          BREASTFEEDING classes 2022    Classes are held in the [address] [facility] [address]    Breastfeeding    January 26 ZOOM CLASS- (Wednesday 12 pm-1:30 pm)    February 1   (Tuesday 11 am - 1 pm)      (Monday 5:30-7:30 pm)     5:30 PM to 7:30 PM    May 17 Tuesday 5:30 PM to 7:30 PM     530 to 7:30 PM     530 to 7:30 PM     530 to 7:30 PM     530 to 7:30 PM     530 to 7:30 PM     530 to 7:30 PM        these classes are free    To sign up for classes  call the Obstetrics Department at  [facility] at  [phone]  More classes will be added as instructor availability increases. Thank you for your patience! Learn and GO with Rogelio              Scan the QR Code  below to access   parent education powered  by [employer].     [employer] gives you access to:     Prenatal   Labor and birth   Postpartum care   Breastfeeding   [place] care  Scan the QR code to access  [employer] at [facility]) -- [facility].

## 2022-10-07 LAB
C. TRACHOMATIS DNA ,URINE: NEGATIVE
CULTURE: NORMAL
HEPATITIS C ANTIBODY: NONREACTIVE
N. GONORRHOEAE DNA, URINE: NEGATIVE
SPECIMEN DESCRIPTION: NORMAL
SPECIMEN DESCRIPTION: NORMAL

## 2022-10-09 LAB
ABSOLUTE EOS #: 0.18 K/UL (ref 0–0.44)
ABSOLUTE IMMATURE GRANULOCYTE: 0.04 K/UL (ref 0–0.3)
ABSOLUTE LYMPH #: 3.41 K/UL (ref 1.1–3.7)
ABSOLUTE MONO #: 0.6 K/UL (ref 0.1–1.2)
BASOPHILS # BLD: 0 % (ref 0–2)
BASOPHILS ABSOLUTE: 0.03 K/UL (ref 0–0.2)
EOSINOPHILS RELATIVE PERCENT: 2 % (ref 1–4)
HCT VFR BLD CALC: 39.2 % (ref 36.3–47.1)
HEMOGLOBIN: 13.2 G/DL (ref 11.9–15.1)
HEPATITIS B SURFACE ANTIGEN: NONREACTIVE
IMMATURE GRANULOCYTES: 0 %
LYMPHOCYTES # BLD: 30 % (ref 24–43)
MCH RBC QN AUTO: 32.6 PG (ref 25.2–33.5)
MCHC RBC AUTO-ENTMCNC: 33.7 G/DL (ref 28.4–34.8)
MCV RBC AUTO: 96.8 FL (ref 82.6–102.9)
MONOCYTES # BLD: 5 % (ref 3–12)
NRBC AUTOMATED: 0 PER 100 WBC
PDW BLD-RTO: 11.9 % (ref 11.8–14.4)
PLATELET # BLD: 315 K/UL (ref 138–453)
PMV BLD AUTO: 8.8 FL (ref 8.1–13.5)
RBC # BLD: 4.05 M/UL (ref 3.95–5.11)
RUBV IGG SER QL: 297.8 IU/ML
SEG NEUTROPHILS: 63 % (ref 36–65)
SEGMENTED NEUTROPHILS ABSOLUTE COUNT: 7.01 K/UL (ref 1.5–8.1)
T. PALLIDUM, IGG: NONREACTIVE
WBC # BLD: 11.3 K/UL (ref 3.5–11.3)

## 2022-11-02 RX ORDER — MONTELUKAST SODIUM 10 MG/1
TABLET ORAL
Qty: 90 TABLET | Refills: 3 | Status: SHIPPED | OUTPATIENT
Start: 2022-11-02

## 2022-11-09 ENCOUNTER — HOSPITAL ENCOUNTER (OUTPATIENT)
Age: 27
Setting detail: SPECIMEN
Discharge: HOME | End: 2022-11-09
Payer: COMMERCIAL

## 2022-11-09 ENCOUNTER — ROUTINE PRENATAL (OUTPATIENT)
Dept: OBGYN | Age: 27
End: 2022-11-09

## 2022-11-09 VITALS — WEIGHT: 152.8 LBS | DIASTOLIC BLOOD PRESSURE: 74 MMHG | BODY MASS INDEX: 27.95 KG/M2 | SYSTOLIC BLOOD PRESSURE: 112 MMHG

## 2022-11-09 DIAGNOSIS — Z3A.12 12 WEEKS GESTATION OF PREGNANCY: ICD-10-CM

## 2022-11-09 DIAGNOSIS — Z12.4 SCREENING FOR MALIGNANT NEOPLASM OF CERVIX: ICD-10-CM

## 2022-11-09 DIAGNOSIS — R19.7 DIARRHEA, UNSPECIFIED TYPE: ICD-10-CM

## 2022-11-09 DIAGNOSIS — Z34.81 ENCOUNTER FOR SUPERVISION OF OTHER NORMAL PREGNANCY IN FIRST TRIMESTER: Primary | ICD-10-CM

## 2022-11-09 PROCEDURE — 87624 HPV HI-RISK TYP POOLED RSLT: CPT

## 2022-11-09 PROCEDURE — 0502F SUBSEQUENT PRENATAL CARE: CPT | Performed by: ADVANCED PRACTICE MIDWIFE

## 2022-11-09 PROCEDURE — 88175 CYTOPATH C/V AUTO FLUID REDO: CPT

## 2022-11-10 LAB
HPV SAMPLE: NORMAL
HPV, GENOTYPE 16: NOT DETECTED
HPV, GENOTYPE 18: NOT DETECTED
HPV, HIGH RISK OTHER: NOT DETECTED
HPV, INTERPRETATION: NORMAL
SPECIMEN DESCRIPTION: NORMAL

## 2022-11-13 ENCOUNTER — TELEPHONE (OUTPATIENT)
Dept: OBGYN | Age: 27
End: 2022-11-13

## 2022-11-14 NOTE — PROGRESS NOTES
Rosibel Alonso is here at [address] for:    Chief Complaint   Patient presents with    Routine Prenatal Visit     TBE, last pap 2022 LGSIL. C/O nausea. Diarrhea last several days. Estimated Due Date: Estimated Date of Delivery: 23    OB History    Para Term  AB Living   2 1 1     1   SAB IAB Ectopic Molar Multiple Live Births           0 1      # Outcome Date GA Lbr Phillip/2nd Weight Sex Delivery Anes PTL Lv   2 Current            1 Term 10/18/20 39w0d / 00:25 6 lb 2 oz (2.778 kg) M Vag-Spont Local N ZENON      Complications: Precipitous Labor        Past Medical History:   Diagnosis Date    Abnormal Pap smear of cervix 2018    Acid reflux     Asthma        Past Surgical History:   Procedure Laterality Date    LEEP  2022    Dr. Silvia Miller    LEEP N/A 2022    LEEP performed by Jose Duke DO at [address] [facility] History     Tobacco Use   Smoking Status Former    Types: Cigarettes    Quit date: 3/3/2020    Years since quittin.6   Smokeless Tobacco Never        Social History     Substance and Sexual Activity   Alcohol Use Not Currently       No results found for this visit on 22. HPI: here for initial ob exam, had LEEP April of this year     PT denies fever, chills, nausea and vomiting       Vitals:  Estimated body mass index is 27.95 kg/m² as calculated from the following:    Height as of 22: 5' 2\" (1.575 m). Weight as of this encounter: 152 lb 12.8 oz (69.3 kg). BP: 112/74  Weight: 152 lb 12.8 oz (69.3 kg)  Patient Position: Sitting  Albumin: Negative  Glucose: Negative  Fundal Height (cm): 12 cm  Fetal HR: 156  Movement: Absent           Abdomen: round,soft, nontender  Total body exam completed please see prenatal physical exam tab in visit navigator     Results reviewed today:    No results found for this visit on 22. ASSESSMENT & Plan    Diagnosis Orders   1.  Encounter for supervision of other normal pregnancy in first trimester        2. 12 weeks gestation of pregnancy        3. Screening for malignant neoplasm of cervix  PAP SMEAR      4. Diarrhea, unspecified type                  I am having Aurelia Sanders maintain her Advair Diskus, albuterol sulfate HFA, levonorgestrel-ethinyl estradiol, ketorolac, fluticasone, omeprazole, Prenatal MV-Min-Fe Fum-FA-DHA (PRENATAL 1 PO), and montelukast.    Return in about 4 weeks (around 12/7/2022) for ob. There are no Patient Instructions on file for this visit.              Thao Patten, FAUSTINA Swartz CNM,11/13/2022 8:18 PM

## 2022-11-14 NOTE — TELEPHONE ENCOUNTER
This patient needs a cervical length sono only in one week (14 weeks gestation) due to having a LEEP procedure this year.

## 2022-11-15 ENCOUNTER — TELEPHONE (OUTPATIENT)
Dept: OBGYN | Age: 27
End: 2022-11-15

## 2022-11-18 RX ORDER — OMEPRAZOLE 20 MG/1
20 CAPSULE, DELAYED RELEASE ORAL
Qty: 90 CAPSULE | Refills: 1 | Status: SHIPPED | OUTPATIENT
Start: 2022-11-18

## 2022-11-21 LAB — CYTOLOGY REPORT: NORMAL

## 2022-11-22 ENCOUNTER — ROUTINE PRENATAL (OUTPATIENT)
Dept: OBGYN | Age: 27
End: 2022-11-22
Payer: COMMERCIAL

## 2022-11-22 VITALS — DIASTOLIC BLOOD PRESSURE: 74 MMHG | BODY MASS INDEX: 28.42 KG/M2 | SYSTOLIC BLOOD PRESSURE: 118 MMHG | WEIGHT: 155.4 LBS

## 2022-11-22 DIAGNOSIS — Z34.81 ENCOUNTER FOR SUPERVISION OF OTHER NORMAL PREGNANCY IN FIRST TRIMESTER: Primary | ICD-10-CM

## 2022-11-22 DIAGNOSIS — Z98.890 HISTORY OF LOOP ELECTROSURGICAL EXCISION PROCEDURE (LEEP) OF CERVIX AFFECTING PREGNANCY, ANTEPARTUM: ICD-10-CM

## 2022-11-22 DIAGNOSIS — Z23 NEED FOR INFLUENZA VACCINATION: ICD-10-CM

## 2022-11-22 DIAGNOSIS — O34.40 HISTORY OF LOOP ELECTROSURGICAL EXCISION PROCEDURE (LEEP) OF CERVIX AFFECTING PREGNANCY, ANTEPARTUM: ICD-10-CM

## 2022-11-22 DIAGNOSIS — Z3A.14 14 WEEKS GESTATION OF PREGNANCY: ICD-10-CM

## 2022-11-22 PROCEDURE — 0502F SUBSEQUENT PRENATAL CARE: CPT | Performed by: ADVANCED PRACTICE MIDWIFE

## 2022-11-22 PROCEDURE — 90674 CCIIV4 VAC NO PRSV 0.5 ML IM: CPT | Performed by: ADVANCED PRACTICE MIDWIFE

## 2022-11-22 PROCEDURE — 90471 IMMUNIZATION ADMIN: CPT | Performed by: ADVANCED PRACTICE MIDWIFE

## 2022-11-27 NOTE — PROGRESS NOTES
Dania Astorga is here at 14w5d for:    Chief Complaint   Patient presents with    Routine Prenatal Visit     Follow up in house ultrasound. Estimated Due Date: Estimated Date of Delivery: 23    OB History    Para Term  AB Living   2 1 1     1   SAB IAB Ectopic Molar Multiple Live Births           0 1      # Outcome Date GA Lbr Phillip/2nd Weight Sex Delivery Anes PTL Lv   2 Current            1 Term 10/18/20 39w0d / 00:25 6 lb 2 oz (2.778 kg) M Vag-Spont Local N ZENON      Complications: Precipitous Labor        Past Medical History:   Diagnosis Date    Abnormal Pap smear of cervix 2018    Acid reflux     Asthma        Past Surgical History:   Procedure Laterality Date    LEEP  2022    Dr. Akhil Mclaughlin LEEP N/A 2022    LEEP performed by Angle Last DO at [address] [place] [address] History     Tobacco Use   Smoking Status Former    Types: Cigarettes    Quit date: 3/3/2020    Years since quittin.7   Smokeless Tobacco Never        Social History     Substance and Sexual Activity   Alcohol Use Not Currently       No results found for this visit on 22. HPI: here for routine ob visit and CL sono due to hx of LEEP, denies c/o     PT denies fever, chills, nausea and vomiting       Vitals:  Estimated body mass index is 28.42 kg/m² as calculated from the following:    Height as of 22: 5' 2\" (1.575 m). Weight as of this encounter: 155 lb 6.4 oz (70.5 kg). BP: 118/74  Weight: 155 lb 6.4 oz (70.5 kg)  Patient Position: Sitting  Albumin: Negative  Glucose: Negative  Movement: Absent           Abdomen: round,soft, nontender    Results reviewed today:  Sono:  OB limit for CL       CL- 3.7 cm   FHR- 161 bpm   Active fetal movements       No results found for this visit on 22. ASSESSMENT & Plan    Diagnosis Orders   1. Encounter for supervision of other normal pregnancy in first trimester        2.  Need for influenza vaccination  Influenza, FLUCELVAX, (age 10 mo+), IM, Preservative Free, 0.5 mL      3. 14 weeks gestation of pregnancy        4. History of loop electrosurgical excision procedure (LEEP) of cervix affecting pregnancy, antepartum                  I am having Aurelia Sanders maintain her Advair Diskus, albuterol sulfate HFA, levonorgestrel-ethinyl estradiol, ketorolac, fluticasone, Prenatal MV-Min-Fe Fum-FA-DHA (PRENATAL 1 PO), montelukast, and omeprazole. Return in about 2 weeks (around 12/6/2022) for ob. There are no Patient Instructions on file for this visit. Clinical References           influenza virus vaccine (injection)  Pronunciation:  IN floo EN barry QUINN seen  Brand:  Afluria PF Pediatric Quadrivalent [id number], Afluria PF Quadrivalent [id number], Afluria Quadrivalent [id number], Fluarix PF Quadrivalent [id number], Flublok Quadrivalent [id number], Flucelvax PF Quadrivalent [id number], Flucelvax Quadrivalent [id number], FluLaval PF Quadrivalent [id number], Fluzone High-Dose Quadrivalent PF [id number], Fluzone PF Pediatric Quadrivalent [id number], Fluzone PF Quadrivalent [id number], Fluzone Quadrivalent [id number]  What is the most important information I should know about this vaccine? Influenza virus vaccine is made from \"killed viruses\" and will not cause you to become ill with the flu virus. What is influenza virus vaccine? Influenza virus (\"the flu\") is a contagious disease caused by a virus that can spread from one person to another through the air or on surfaces. Flu symptoms include fever, chills, tiredness, aches, sore throat, cough, vomiting, and diarrhea. The flu can also cause sinus infections, ear infections, bronchitis, or serious complications such as pneumonia. Influenza causes thousands of deaths each year, and hundreds of thousands of hospitalizations.  Influenza is most dangerous in children, pregnant women, older adults, and people with weak immune systems or health problems such as diabetes, heart disease, or cancer. Influenza virus vaccine is for use in adults and children at least 6 months old, to prevent infection caused by influenza virus. This vaccine helps your body develop immunity to the disease, but will not treat an active infection you already have. Influenza virus vaccine is redeveloped each year to contain specific strains of inactivated (killed) flu virus that are recommended by public health officials for that year. The injectable influenza virus vaccine (flu shot) is made from \"killed viruses. \" Influenza virus vaccine is also available in a nasal spray form, which is a \"live virus\" vaccine. This medication guide addresses only the injectable form of this vaccine. Like any vaccine, influenza virus vaccine may not provide protection from disease in every person. What should I discuss with my healthcare provider before receiving this vaccine? You may not be able to receive this vaccine if you are allergic to eggs, or if you have ever had a severe allergic reaction to a flu vaccine. Tell your vaccination provider if you have:   a weak immune system (caused by disease or by using certain medicine); or   a history of Guillain-[place] syndrome (within 6 weeks after receiving a flu vaccine). You can still receive a vaccine if you have a minor cold. In the case of a more severe illness with a fever or any type of infection, wait until you get better before receiving this vaccine. Tell your vaccination provider if you are pregnant or breastfeeding. The Centers for Disease Control and Prevention recommends that pregnant women get a flu shot during any trimester of pregnancy to protect themselves and their  babies from flu. The nasal spray form of influenza vaccine is not recommended for use in pregnant women. How is this vaccine given? Some brands of this vaccine are made for use in adults and not in children.  Your child's vaccination provider can recommend the best influenza virus vaccine for your child. This vaccine is given as an injection (shot) into a muscle. Children 6 months to 6years old may need a second flu shot 4 weeks after receiving the first vaccine. The influenza virus vaccine is usually given in October or November. Follow your doctor's instructions or the schedule recommended by your local health department. Since the influenza virus vaccine is redeveloped each year for specific strains of influenza, you should receive a flu vaccine every year. What happens if I miss a dose? Call your doctor if you forget to receive your yearly flu shot in October or November, or if your child misses a booster dose. What happens if I overdose? An overdose of this vaccine is unlikely to occur. What should I avoid before or after receiving this vaccine? Follow your vaccination provider's instructions about any restrictions on food, beverages, or activity. What are the possible side effects of influenza virus vaccine? Get emergency medical help if you have signs of an allergic reaction: hives; difficulty breathing; swelling of your face, lips, tongue, or throat. You should not receive a booster vaccine if you had a life threatening allergic reaction after the first shot. Keep track of any and all side effects you have. If you receive an influenza virus vaccine in the future, you will need to tell the vaccination provider if the previous shot caused any side effects. Influenza virus vaccine is made from \"killed viruses\" and will not cause you to become ill with the flu virus. You may have flu-like symptoms at any time during flu season that may be caused by other strains of influenza virus. Call your doctor at once if you have:   a light-headed feeling, like you might pass out;   severe weakness or unusual feeling in your arms and legs;   numbness, pain, tingling, burning or prickly feeling;   vision or hearing problems; or   a fever higher than 101 degrees F.   Common side effects may include:   pain, redness, tenderness, swelling, bruising, or a hard lump where the shot was given;   diarrhea, loss of appetite;   muscle pain;   headache, tiredness; or   fussiness, crying, or drowsiness in a child. This is not a complete list of side effects and others may occur. Call your doctor for medical advice about side effects. You may report vaccine side effects to the [address] [place] [address] and Human Services at [phone]. What other drugs will affect influenza virus vaccine? If you are using any of these medications, you may not be able to receive the vaccine, or may need to wait until the other treatments are finished:   steroid medicine;   medications to treat psoriasis, rheumatoid arthritis, or other autoimmune disorders; or   medicines to treat or prevent organ transplant rejection. This list is not complete. Other drugs may affect influenza virus vaccine, including prescription and over-the-counter medicines, vitamins, and herbal products. Not all possible drug interactions are listed here. Where can I get more information? Your vaccination provider, pharmacist, or doctor can provide more information about this vaccine. Additional information is available from your local health department or the Centers for Disease Control and Prevention. Remember, keep this and all other medicines out of the reach of children, never share your medicines with others, and use this medication only for the indication prescribed. Every effort has been made to ensure that the information provided by [address] is accurate, up-to-date, and complete, but no guarantee is made to that effect. Drug information contained herein may be time sensitive.  [employer] information has been compiled for use by healthcare practitioners and consumers in the [place] and therefore [employer] does not warrant that uses outside of the [place] are appropriate, unless specifically indicated otherwise. [facility] drug information does not endorse drugs, diagnose patients or recommend therapy. [facility] drug information is an informational resource designed to assist licensed healthcare practitioners in caring for their patients and/or to serve consumers viewing this service as a supplement to, and not a substitute for, the expertise, skill, knowledge and judgment of healthcare practitioners. The absence of a warning for a given drug or drug combination in no way should be construed to indicate that the drug or drug combination is safe, effective or appropriate for any given patient. [facility] does not assume any responsibility for any aspect of healthcare administered with the aid of information [facility] provides. The information contained herein is not intended to cover all possible uses, directions, precautions, warnings, drug interactions, allergic reactions, or adverse effects. If you have questions about the drugs you are taking, check with your doctor, nurse or pharmacist.  Copyright 3515-2729 [address]. Version: 9.01. Revision date: 10/7/2021. Care instructions adapted under license by [facility] ([facility]). If you have questions about a medical condition or this instruction, always ask your healthcare professional. [place] [address] any warranty or liability for your use of this information.                     FAUSTINA Naik - LINDAM,11/27/2022 2:40 PM

## 2023-01-09 ENCOUNTER — ROUTINE PRENATAL (OUTPATIENT)
Dept: OBGYN | Age: 28
End: 2023-01-09

## 2023-01-09 VITALS — DIASTOLIC BLOOD PRESSURE: 80 MMHG | WEIGHT: 157 LBS | BODY MASS INDEX: 28.72 KG/M2 | SYSTOLIC BLOOD PRESSURE: 122 MMHG

## 2023-01-09 DIAGNOSIS — Z3A.21 21 WEEKS GESTATION OF PREGNANCY: ICD-10-CM

## 2023-01-09 DIAGNOSIS — Z34.82 ENCOUNTER FOR SUPERVISION OF OTHER NORMAL PREGNANCY IN SECOND TRIMESTER: Primary | ICD-10-CM

## 2023-01-09 PROCEDURE — 0502F SUBSEQUENT PRENATAL CARE: CPT | Performed by: ADVANCED PRACTICE MIDWIFE

## 2023-01-09 NOTE — PROGRESS NOTES
Dania Rosario is here at 21w4d for:    Chief Complaint   Patient presents with    Routine Prenatal Visit     Follow up in house ultrasound. C/O red are around navel, took piercing out approx. 1 1/2 weeks ago. Estimated Due Date: Estimated Date of Delivery: 23    OB History    Para Term  AB Living   2 1 1     1   SAB IAB Ectopic Molar Multiple Live Births           0 1      # Outcome Date GA Lbr Phillip/2nd Weight Sex Delivery Anes PTL Lv   2 Current            1 Term 10/18/20 39w0d / 00:25 6 lb 2 oz (2.778 kg) M Vag-Spont Local N ZENON      Complications: Precipitous Labor        Past Medical History:   Diagnosis Date    Abnormal Pap smear of cervix 2018    Acid reflux     Asthma        Past Surgical History:   Procedure Laterality Date    LEEP  2022    Dr. Nelli Bonds LEEP N/A 2022    LEEP performed by Robert Tarango DO at [address] [facility] History     Tobacco Use   Smoking Status Former    Types: Cigarettes    Quit date: 3/3/2020    Years since quittin.8   Smokeless Tobacco Never        Social History     Substance and Sexual Activity   Alcohol Use Not Currently       No results found for this visit on 23. HPI: here for routine ob visit and anatomy scan WNL, c/o redness at previous umbilical piercing site     PT denies fever, chills, nausea and vomiting       Vitals:  Estimated body mass index is 28.72 kg/m² as calculated from the following:    Height as of 22: 5' 2\" (1.575 m). Weight as of this encounter: 157 lb (71.2 kg).   BP: 122/80  Weight: 157 lb (71.2 kg)  Patient Position: Sitting  Albumin: Negative  Glucose: Negative  Fundal Height (cm): 20 cm  Fetal HR: 147  Movement: Present  Presentation: Breech           Abdomen: round,soft, nontender  Piercing site is red but not draining    Results reviewed today:  Sono:  21.3 WK IUP  CL:4.9cm  HR:148bpm  Anterior placenta, breech presentation  Ovaries: both seen, wnl  Gender: male Active fetal movements  All visualized fetal anatomy WNL    No results found for this visit on 01/09/23. ASSESSMENT & Plan    Diagnosis Orders   1. Encounter for supervision of other normal pregnancy in second trimester        2. 21 weeks gestation of pregnancy            reviewed cleaning of piercing site and BID neosporin use. Reviewed danger signs, if purulent drainage or larger red area will cover with antibiotics      I have discontinued Aurelia AJose R Sanders's Advair Diskus, albuterol sulfate HFA, levonorgestrel-ethinyl estradiol, ketorolac, and fluticasone. I am also having her maintain her Prenatal MV-Min-Fe Fum-FA-DHA (PRENATAL 1 PO), montelukast, and omeprazole. Return in about 4 weeks (around 2/6/2023) for ob. There are no Patient Instructions on file for this visit.              FAUSTINA Santos CNM,1/9/2023 4:52 PM

## 2023-02-06 ENCOUNTER — ROUTINE PRENATAL (OUTPATIENT)
Dept: OBGYN | Age: 28
End: 2023-02-06

## 2023-02-06 VITALS — WEIGHT: 160.8 LBS | SYSTOLIC BLOOD PRESSURE: 122 MMHG | BODY MASS INDEX: 29.41 KG/M2 | DIASTOLIC BLOOD PRESSURE: 74 MMHG

## 2023-02-06 DIAGNOSIS — Z34.82 ENCOUNTER FOR SUPERVISION OF OTHER NORMAL PREGNANCY IN SECOND TRIMESTER: Primary | ICD-10-CM

## 2023-02-06 DIAGNOSIS — Z3A.25 25 WEEKS GESTATION OF PREGNANCY: ICD-10-CM

## 2023-02-06 PROCEDURE — 0502F SUBSEQUENT PRENATAL CARE: CPT | Performed by: ADVANCED PRACTICE MIDWIFE

## 2023-02-06 NOTE — PROGRESS NOTES
Ashlee Pillai is here at 25w4d for:    Chief Complaint   Patient presents with    Routine Prenatal Visit     Instructions for 1 hour gtt. At 28 weeks. Estimated Due Date: Estimated Date of Delivery: 23    OB History    Para Term  AB Living   2 1 1     1   SAB IAB Ectopic Molar Multiple Live Births           0 1      # Outcome Date GA Lbr Phillip/2nd Weight Sex Delivery Anes PTL Lv   2 Current            1 Term 10/18/20 39w0d / 00:25 6 lb 2 oz (2.778 kg) M Vag-Spont Local N ZENON      Complications: Precipitous Labor        Past Medical History:   Diagnosis Date    Abnormal Pap smear of cervix 2018    Acid reflux     Asthma        Past Surgical History:   Procedure Laterality Date    LEEP  2022    Dr. Catracho OLIVER N/A 2022    LEEP performed by Kevin Sharpe DO at [address] [place] [address] History     Tobacco Use   Smoking Status Former    Types: Cigarettes    Quit date: 3/3/2020    Years since quittin.9   Smokeless Tobacco Never        Social History     Substance and Sexual Activity   Alcohol Use Not Currently       No results found for this visit on 23. HPI: here for routine ob visit, denies c/o     PT denies fever, chills, nausea and vomiting       Vitals:  Estimated body mass index is 29.41 kg/m² as calculated from the following:    Height as of 22: 5' 2\" (1.575 m). Weight as of this encounter: 160 lb 12.8 oz (72.9 kg). BP: 122/74  Weight: 160 lb 12.8 oz (72.9 kg)  Patient Position: Sitting  Albumin: Negative  Glucose: Negative  Fundal Height (cm): 25 cm  Fetal HR: 140  Movement: Present           Abdomen: round,soft, nontender    Results reviewed today:    No results found for this visit on 23. ASSESSMENT & Plan    Diagnosis Orders   1. Encounter for supervision of other normal pregnancy in second trimester        2. 25 weeks gestation of pregnancy                  I am having Aurelia Sanders maintain her Prenatal MV-Min-Fe Fum-FA-DHA (PRENATAL 1 PO), montelukast, and omeprazole. Return in about 3 weeks (around 2/27/2023) for ob gtt+rhogam.    There are no Patient Instructions on file for this visit.              FAUSTINA Mina - MIGUEL,2/6/2023 4:40 PM

## 2023-02-27 ENCOUNTER — ROUTINE PRENATAL (OUTPATIENT)
Dept: OBGYN | Age: 28
End: 2023-02-27

## 2023-02-27 ENCOUNTER — HOSPITAL ENCOUNTER (OUTPATIENT)
Age: 28
Setting detail: SPECIMEN
Discharge: HOME | End: 2023-02-27
Payer: COMMERCIAL

## 2023-02-27 VITALS — SYSTOLIC BLOOD PRESSURE: 126 MMHG | BODY MASS INDEX: 30.36 KG/M2 | WEIGHT: 166 LBS | DIASTOLIC BLOOD PRESSURE: 74 MMHG

## 2023-02-27 DIAGNOSIS — Z3A.28 28 WEEKS GESTATION OF PREGNANCY: ICD-10-CM

## 2023-02-27 DIAGNOSIS — Z67.91 RH NEGATIVE STATUS DURING PREGNANCY IN SECOND TRIMESTER: ICD-10-CM

## 2023-02-27 DIAGNOSIS — Z34.83 ENCOUNTER FOR SUPERVISION OF OTHER NORMAL PREGNANCY IN THIRD TRIMESTER: Primary | ICD-10-CM

## 2023-02-27 DIAGNOSIS — O26.892 RH NEGATIVE STATUS DURING PREGNANCY IN SECOND TRIMESTER: ICD-10-CM

## 2023-02-27 LAB
GLUCOSE ADMINISTRATION: NORMAL
GLUCOSE TOLERANCE SCREEN 50G: 115 MG/DL (ref 70–135)
HGB BLD-MCNC: 12.5 G/DL (ref 11.9–15.1)

## 2023-02-27 PROCEDURE — 86901 BLOOD TYPING SEROLOGIC RH(D): CPT

## 2023-02-27 PROCEDURE — 82950 GLUCOSE TEST: CPT

## 2023-02-27 PROCEDURE — 85018 HEMOGLOBIN: CPT

## 2023-02-27 PROCEDURE — 0502F SUBSEQUENT PRENATAL CARE: CPT | Performed by: ADVANCED PRACTICE MIDWIFE

## 2023-02-27 PROCEDURE — 86850 RBC ANTIBODY SCREEN: CPT

## 2023-02-27 NOTE — PROGRESS NOTES
Peri Montez is here at 28w4d for:    Chief Complaint   Patient presents with    Routine Prenatal Visit     1 hour gtt., patient needs rhogam. C/O tightening in stomach when bending at work. Estimated Due Date: Estimated Date of Delivery: 23    OB History    Para Term  AB Living   2 1 1     1   SAB IAB Ectopic Molar Multiple Live Births           0 1      # Outcome Date GA Lbr Phillip/2nd Weight Sex Delivery Anes PTL Lv   2 Current            1 Term 10/18/20 39w0d / 00:25 6 lb 2 oz (2.778 kg) M Vag-Spont Local N ZENON      Complications: Precipitous Labor        Past Medical History:   Diagnosis Date    Abnormal Pap smear of cervix 2018    Acid reflux     Asthma        Past Surgical History:   Procedure Laterality Date    LEEP  2022    Dr. Bonnee Merlin LEEP N/A 2022    LEEP performed by Dilan Osullivan DO at [facility] History     Tobacco Use   Smoking Status Former    Types: Cigarettes    Quit date: 3/3/2020    Years since quittin.9   Smokeless Tobacco Never        Social History     Substance and Sexual Activity   Alcohol Use Not Currently       No results found for this visit on 23. 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 Weight as of this encounter: 166 lb (75.3 kg). BP: 126/74  Weight: 166 lb (75.3 kg)  Patient Position: Sitting  Albumin: Negative  Glucose: Negative  Movement: Present           Abdomen: round,soft, nontender    Results reviewed today:    No results found for this visit on 23. ASSESSMENT & Plan    Diagnosis Orders   1.  Encounter for supervision of other normal pregnancy in third trimester        2. 28 weeks gestation of pregnancy  Glucose tolerance, 1 hour Rhogam Immune Globulin, Antepartum    Hemoglobin      3. Rh negative status during pregnancy in second trimester  Rhogam Immune Globulin, Antepartum        Will start to wear prenatal cradle again, will see if she can get more help with the bending over jobs        I am having Aurelia Sanders maintain her Prenatal MV-Min-Fe Fum-FA-DHA (PRENATAL 1 PO), montelukast, and omeprazole. Return in about 2 weeks (around 3/13/2023) for ob 30wkUS+tdap. There are no Patient Instructions on file for this visit.              FAUSTINA Blanco CNM,2/27/2023 3:25 PM

## 2023-02-28 ENCOUNTER — HOSPITAL ENCOUNTER (OUTPATIENT)
Dept: INFUSION THERAPY | Age: 28
Discharge: HOME | End: 2023-02-28
Payer: COMMERCIAL

## 2023-02-28 ENCOUNTER — TELEPHONE (OUTPATIENT)
Dept: OBGYN | Age: 28
End: 2023-02-28

## 2023-02-28 VITALS
DIASTOLIC BLOOD PRESSURE: 67 MMHG | HEART RATE: 86 BPM | RESPIRATION RATE: 20 BRPM | SYSTOLIC BLOOD PRESSURE: 124 MMHG | TEMPERATURE: 97.8 F

## 2023-02-28 DIAGNOSIS — O92.70 LACTATION DISORDER: Primary | ICD-10-CM

## 2023-02-28 PROCEDURE — 6360000002 HC RX W HCPCS: Performed by: ADVANCED PRACTICE MIDWIFE

## 2023-02-28 PROCEDURE — 96372 THER/PROPH/DIAG INJ SC/IM: CPT

## 2023-02-28 RX ORDER — BREAST PUMP
EACH MISCELLANEOUS
Qty: 1 EACH | Refills: 0 | Status: SHIPPED | OUTPATIENT
Start: 2023-02-28

## 2023-02-28 RX ADMIN — HUMAN RHO(D) IMMUNE GLOBULIN 300 MCG: 300 INJECTION, SOLUTION INTRAMUSCULAR at 15:34

## 2023-02-28 NOTE — DISCHARGE INSTRUCTIONS
Outpatient Instructions for IM or Subcutaneous Injections    [address] [place]   [phone]      You are advised to carry out the following instructions:      Diet:  As prescribed by your physician. Activity:  As prescribed by your physician. Care of the injection site: If your injection site becomes red, sore, swollen, painful,has drainage, or you develop a fever notify your Physician. Other:    If you develop hives, rash, itching or have trouble breathing or any unusual symptoms, go to the nearest Emergency Room. These could be signs of an allergic reaction to the medication.         Follow up appointment:               ANY PROBLEMS OR CONCERNS FOLLOW UP WITH YOUR PHYSICIAN                                                            OR                 GO TO THE NEAREST EMERGENCY ROOM

## 2023-03-01 LAB
ABO/RH: NORMAL
ANTIBODY SCREEN: NEGATIVE
BLD PROD TYP BPU: NORMAL
BLD PROD TYP BPU: NORMAL
HISTORY CHECK: NORMAL
Lab: 1
STATUS OF UNITS: NORMAL
TRANSFUSION STATUS: NORMAL
UNIT DIVISION: 0
UNIT NUMBER: NORMAL

## 2023-03-13 ENCOUNTER — ROUTINE PRENATAL (OUTPATIENT)
Dept: OBGYN | Age: 28
End: 2023-03-13

## 2023-03-13 VITALS — WEIGHT: 165 LBS | DIASTOLIC BLOOD PRESSURE: 82 MMHG | BODY MASS INDEX: 30.18 KG/M2 | SYSTOLIC BLOOD PRESSURE: 122 MMHG

## 2023-03-13 DIAGNOSIS — Z34.83 ENCOUNTER FOR SUPERVISION OF OTHER NORMAL PREGNANCY IN THIRD TRIMESTER: Primary | ICD-10-CM

## 2023-03-13 DIAGNOSIS — Z3A.30 30 WEEKS GESTATION OF PREGNANCY: ICD-10-CM

## 2023-03-13 PROCEDURE — 0502F SUBSEQUENT PRENATAL CARE: CPT | Performed by: ADVANCED PRACTICE MIDWIFE

## 2023-03-13 NOTE — PROGRESS NOTES
Chayo Mederos is here at 30w4d for:    Chief Complaint   Patient presents with    Routine Prenatal Visit     Follow up in house ultrasound. Estimated Due Date: Estimated Date of Delivery: 23    OB History    Para Term  AB Living   2 1 1     1   SAB IAB Ectopic Molar Multiple Live Births           0 1      # Outcome Date GA Lbr Phillip/2nd Weight Sex Delivery Anes PTL Lv   2 Current            1 Term 10/18/20 39w0d / 00:25 6 lb 2 oz (2.778 kg) M Vag-Spont Local N ZENON      Complications: Precipitous Labor        Past Medical History:   Diagnosis Date    Abnormal Pap smear of cervix 2018    Acid reflux     Asthma        Past Surgical History:   Procedure Laterality Date    LEEP  2022    Dr. Betzaida Valadez    LEEP N/A 2022    LEEP performed by Gallo Ritchie DO at [address] [place] [address] History     Tobacco Use   Smoking Status Former    Types: Cigarettes    Quit date: 3/3/2020    Years since quitting: 3.0   Smokeless Tobacco Never        Social History     Substance and Sexual Activity   Alcohol Use Not Currently       No results found for this visit on 23. HPI: here for routine ob visit and growth sono, denies c/o     PT denies fever, chills, nausea and vomiting       Vitals:  Estimated body mass index is 30.18 kg/m² as calculated from the following:    Height as of 22: 5' 2\" (1.575 m). Weight as of this encounter: 165 lb (74.8 kg). BP: 122/82  Weight: 165 lb (74.8 kg)  Patient Position: Sitting  Albumin: Negative  Glucose: Negative  Movement: Present  Presentation: Vertex           Abdomen: gravid, soft,     Results reviewed today:  Sono:  30.4 WK IUP  EFW: 50%, 3 lb 8 oz  CL: 3.7 cm  JUDITH: 16.5 cm  HR: 155 bpm  Anterior gr 2 placenta, vertex presentation  Active fetal movements     No results found for this visit on 23. ASSESSMENT & Plan    Diagnosis Orders   1.  Encounter for supervision of other normal pregnancy in third trimester        2. 30 weeks gestation of pregnancy                  I am having Aurelia Sanders maintain her Prenatal MV-Min-Fe Fum-FA-DHA (PRENATAL 1 PO), montelukast, omeprazole, and Breast Pump. Return in about 2 weeks (around 3/27/2023) for ob. There are no Patient Instructions on file for this visit.              FAUSTINA Bowie CNM,3/13/2023 10:23 PM

## 2023-03-27 ENCOUNTER — ROUTINE PRENATAL (OUTPATIENT)
Dept: OBGYN | Age: 28
End: 2023-03-27

## 2023-03-27 VITALS — DIASTOLIC BLOOD PRESSURE: 74 MMHG | BODY MASS INDEX: 30.51 KG/M2 | WEIGHT: 166.8 LBS | SYSTOLIC BLOOD PRESSURE: 126 MMHG

## 2023-03-27 DIAGNOSIS — Z34.83 ENCOUNTER FOR SUPERVISION OF OTHER NORMAL PREGNANCY IN THIRD TRIMESTER: Primary | ICD-10-CM

## 2023-03-27 DIAGNOSIS — Z3A.32 32 WEEKS GESTATION OF PREGNANCY: ICD-10-CM

## 2023-03-27 PROCEDURE — 0502F SUBSEQUENT PRENATAL CARE: CPT | Performed by: ADVANCED PRACTICE MIDWIFE

## 2023-03-27 NOTE — PROGRESS NOTES
Mercedes Ramos is here at [address] [facility] for:    Chief Complaint   Patient presents with    Routine Prenatal Visit     Cough and runny nose. Estimated Due Date: Estimated Date of Delivery: 23    OB History    Para Term  AB Living   2 1 1     1   SAB IAB Ectopic Molar Multiple Live Births           0 1      # Outcome Date GA Lbr Phillip/2nd Weight Sex Delivery Anes PTL Lv   2 Current            1 Term 10/18/20 39w0d / 00:25 6 lb 2 oz (2.778 kg) M Vag-Spont Local N ZENON      Complications: Precipitous Labor        Past Medical History:   Diagnosis Date    Abnormal Pap smear of cervix 2018    Acid reflux     Asthma        Past Surgical History:   Procedure Laterality Date    LEEP  2022    Dr. Darci Galindo LEECHEMA N/A 2022    LEEP performed by Dayo Ashley DO at [facility] History     Tobacco Use   Smoking Status Former    Types: Cigarettes    Quit date: 3/3/2020    Years since quitting: 3.0   Smokeless Tobacco Never        Social History     Substance and Sexual Activity   Alcohol Use Not Currently       No results found for this visit on 23. HPI: here for routine ob visit, has a cold that is \"getting better\"     PT denies fever, chills, nausea and vomiting       Vitals:  Estimated body mass index is 30.51 kg/m² as calculated from the following:    Height as of 22: 5' 2\" (1.575 m). Weight as of this encounter: 166 lb 12.8 oz (75.7 kg). BP: 126/74  Weight: 166 lb 12.8 oz (75.7 kg)  Patient Position: Sitting  Albumin: Negative  Glucose: Negative  Movement: Present           Abdomen: round,soft, nontender    Results reviewed today:    No results found for this visit on 23. ASSESSMENT & Plan    Diagnosis Orders   1. Encounter for supervision of other normal pregnancy in third trimester        2. 32 weeks gestation of pregnancy                  I am having Aurelia Sanders maintain her Prenatal MV-Min-Fe Fum-FA-DHA (PRENATAL 1 PO),

## 2023-04-24 ENCOUNTER — ROUTINE PRENATAL (OUTPATIENT)
Dept: OBGYN | Age: 28
End: 2023-04-24

## 2023-04-24 ENCOUNTER — HOSPITAL ENCOUNTER (OUTPATIENT)
Age: 28
Setting detail: SPECIMEN
Discharge: HOME | End: 2023-04-24
Payer: COMMERCIAL

## 2023-04-24 VITALS — BODY MASS INDEX: 30.73 KG/M2 | DIASTOLIC BLOOD PRESSURE: 74 MMHG | SYSTOLIC BLOOD PRESSURE: 126 MMHG | WEIGHT: 168 LBS

## 2023-04-24 DIAGNOSIS — Z3A.36 36 WEEKS GESTATION OF PREGNANCY: ICD-10-CM

## 2023-04-24 DIAGNOSIS — Z34.83 ENCOUNTER FOR SUPERVISION OF OTHER NORMAL PREGNANCY IN THIRD TRIMESTER: Primary | ICD-10-CM

## 2023-04-24 PROCEDURE — 87081 CULTURE SCREEN ONLY: CPT

## 2023-04-28 LAB
MICROORGANISM SPEC CULT: NORMAL
SPECIMEN DESCRIPTION: NORMAL

## 2023-04-29 NOTE — PROGRESS NOTES
Al Harris is here at 37w2d for:    Chief Complaint   Patient presents with    Routine Prenatal Visit     GBS. Estimated Due Date: Estimated Date of Delivery: 23    OB History    Para Term  AB Living   2 1 1     1   SAB IAB Ectopic Molar Multiple Live Births           0 1      # Outcome Date GA Lbr Phillip/2nd Weight Sex Delivery Anes PTL Lv   2 Current            1 Term 10/18/20 39w0d / 00:25 6 lb 2 oz (2.778 kg) M Vag-Spont Local N ZENON      Complications: Precipitous Labor        Past Medical History:   Diagnosis Date    Abnormal Pap smear of cervix 2018    Acid reflux     Asthma        Past Surgical History:   Procedure Laterality Date    LEEP  2022    Dr. Elza Arizmendi LEEP N/A 2022    LEEP performed by Paddy Friedman DO at [address] [place] [address] History     Tobacco Use   Smoking Status Former    Types: Cigarettes    Quit date: 3/3/2020    Years since quitting: 3.1   Smokeless Tobacco Never        Social History     Substance and Sexual Activity   Alcohol Use Not Currently       No results found for this visit on 23. HPI: here for routine ob visit and gbs, denies c/o     PT denies fever, chills, nausea and vomiting       Vitals:  Estimated body mass index is 30.73 kg/m² as calculated from the following:    Height as of 22: 5' 2\" (1.575 m). Weight as of this encounter: 168 lb (76.2 kg). BP: 126/74  Weight: 168 lb (76.2 kg)  Patient Position: Sitting  Albumin: Negative  Glucose: Negative  Movement: Present           Abdomen: gravid,soft, nontender    Results reviewed today:    No results found for this visit on 23. ASSESSMENT & Plan    Diagnosis Orders   1. Encounter for supervision of other normal pregnancy in third trimester        2. 36 weeks gestation of pregnancy  Culture, Strep B Screen, Vaginal/Rectal                I am having Aurelia Sanders maintain her Prenatal MV-Min-Fe Fum-FA-DHA (PRENATAL 1 PO), montelukast,

## 2023-04-29 NOTE — PROGRESS NOTES
Pat Primer is here at 34w2d for:    Chief Complaint   Patient presents with    Routine Prenatal Visit     GBS. Estimated Due Date: Estimated Date of Delivery: 23    OB History    Para Term  AB Living   2 1 1     1   SAB IAB Ectopic Molar Multiple Live Births           0 1      # Outcome Date GA Lbr Phillip/2nd Weight Sex Delivery Anes PTL Lv   2 Current            1 Term 10/18/20 39w0d / 00:25 6 lb 2 oz (2.778 kg) M Vag-Spont Local N ZENON      Complications: Precipitous Labor        Past Medical History:   Diagnosis Date    Abnormal Pap smear of cervix 2018    Acid reflux     Asthma        Past Surgical History:   Procedure Laterality Date    LEEP  2022    Dr. Young Cea LEEP N/A 2022    LEEP performed by Isabel Riggs DO at [address] [place] [address] History     Tobacco Use   Smoking Status Former    Types: Cigarettes    Quit date: 3/3/2020    Years since quitting: 3.1   Smokeless Tobacco Never        Social History     Substance and Sexual Activity   Alcohol Use Not Currently       No results found for this visit on 23. HPI: here for routine ob visit and gbs, denies c/o     PT denies fever, chills, nausea and vomiting       Vitals:  Estimated body mass index is 30.73 kg/m² as calculated from the following:    Height as of 22: 5' 2\" (1.575 m). Weight as of this encounter: 168 lb (76.2 kg). BP: 126/74  Weight: 168 lb (76.2 kg)  Patient Position: Sitting  Albumin: Negative  Glucose: Negative  Fundal Height (cm): 36 cm  Fetal HR: 140  Movement: Present  Presentation: Vertex           Abdomen: gravid, soft, nontender    Results reviewed today:    No results found for this visit on 23. ASSESSMENT & Plan    Diagnosis Orders   1. Encounter for supervision of other normal pregnancy in third trimester        2. 36 weeks gestation of pregnancy  Culture, Strep B Screen, Vaginal/Rectal                I am having Aurelia prdao

## 2023-05-01 ENCOUNTER — ROUTINE PRENATAL (OUTPATIENT)
Dept: OBGYN | Age: 28
End: 2023-05-01

## 2023-05-01 VITALS — WEIGHT: 175.2 LBS | SYSTOLIC BLOOD PRESSURE: 118 MMHG | BODY MASS INDEX: 32.04 KG/M2 | DIASTOLIC BLOOD PRESSURE: 70 MMHG

## 2023-05-01 DIAGNOSIS — Z34.83 ENCOUNTER FOR SUPERVISION OF OTHER NORMAL PREGNANCY IN THIRD TRIMESTER: Primary | ICD-10-CM

## 2023-05-01 DIAGNOSIS — Z3A.37 37 WEEKS GESTATION OF PREGNANCY: ICD-10-CM

## 2023-05-01 PROCEDURE — 0502F SUBSEQUENT PRENATAL CARE: CPT | Performed by: ADVANCED PRACTICE MIDWIFE

## 2023-05-01 NOTE — PROGRESS NOTES
Valencia Qureshi is here at 37w4d for:    Chief Complaint   Patient presents with    Routine Prenatal Visit     [place] contractions. Estimated Due Date: Estimated Date of Delivery: 23    OB History    Para Term  AB Living   2 1 1     1   SAB IAB Ectopic Molar Multiple Live Births           0 1      # Outcome Date GA Lbr Phillip/2nd Weight Sex Delivery Anes PTL Lv   2 Current            1 Term 10/18/20 39w0d / 00:25 6 lb 2 oz (2.778 kg) M Vag-Spont Local N ZENON      Complications: Precipitous Labor        Past Medical History:   Diagnosis Date    Abnormal Pap smear of cervix 2018    Acid reflux     Asthma        Past Surgical History:   Procedure Laterality Date    LEEP  2022    Dr. Mo Mar LEEP N/A 2022    LEEP performed by Yifan Zamora DO at [address] [place] [address] History     Tobacco Use   Smoking Status Former    Types: Cigarettes    Quit date: 3/3/2020    Years since quitting: 3.1   Smokeless Tobacco Never        Social History     Substance and Sexual Activity   Alcohol Use Not Currently       No results found for this visit on 23. HPI: here for routine ob visit, denies c/o     PT denies fever, chills, nausea and vomiting       Vitals:  Estimated body mass index is 32.04 kg/m² as calculated from the following:    Height as of 22: 5' 2\" (1.575 m). Weight as of this encounter: 175 lb 3.2 oz (79.5 kg). BP: 118/70  Weight: 175 lb 3.2 oz (79.5 kg)  Patient Position: Sitting  Albumin: Negative  Glucose: Negative  Movement: Present           Abdomen: round,soft, nontender    Results reviewed today:    No results found for this visit on 23. ASSESSMENT & Plan    Diagnosis Orders   1. Encounter for supervision of other normal pregnancy in third trimester        2. 37 weeks gestation of pregnancy                  I am having Aurelia Sanders maintain her Prenatal MV-Min-Fe Fum-FA-DHA (PRENATAL 1 PO), montelukast, omeprazole,

## 2023-05-03 ENCOUNTER — HOSPITAL ENCOUNTER (OUTPATIENT)
Age: 28
Discharge: HOME | End: 2023-05-03
Payer: COMMERCIAL

## 2023-05-03 DIAGNOSIS — Z13.220 LIPID SCREENING: ICD-10-CM

## 2023-05-03 DIAGNOSIS — Z00.00 WELLNESS EXAMINATION: ICD-10-CM

## 2023-05-03 DIAGNOSIS — R73.09 ELEVATED GLUCOSE: ICD-10-CM

## 2023-05-03 DIAGNOSIS — E66.3 OVERWEIGHT: ICD-10-CM

## 2023-05-03 LAB
ALT SERPL-CCNC: 13 U/L (ref 5–33)
ANION GAP SERPL CALCULATED.3IONS-SCNC: 13 MMOL/L (ref 9–17)
AST SERPL-CCNC: 18 U/L
BUN SERPL-MCNC: 8 MG/DL (ref 6–20)
BUN/CREAT BLD: 16 (ref 9–20)
CALCIUM SERPL-MCNC: 9.4 MG/DL (ref 8.6–10.4)
CHLORIDE SERPL-SCNC: 102 MMOL/L (ref 98–107)
CHOLEST SERPL-MCNC: 269 MG/DL
CHOLESTEROL/HDL RATIO: 4
CO2 SERPL-SCNC: 20 MMOL/L (ref 20–31)
CREAT SERPL-MCNC: 0.49 MG/DL (ref 0.5–0.9)
GFR SERPL CREATININE-BSD FRML MDRD: >60 ML/MIN/1.73M2
GLUCOSE SERPL-MCNC: 79 MG/DL (ref 70–99)
HCT VFR BLD AUTO: 38.2 % (ref 36.3–47.1)
HDLC SERPL-MCNC: 68 MG/DL
HGB BLD-MCNC: 12.8 G/DL (ref 11.9–15.1)
LDLC SERPL CALC-MCNC: 146 MG/DL (ref 0–130)
MCH RBC QN AUTO: 33.5 PG (ref 25.2–33.5)
MCHC RBC AUTO-ENTMCNC: 33.5 G/DL (ref 28.4–34.8)
MCV RBC AUTO: 100 FL (ref 82.6–102.9)
NRBC AUTOMATED: 0 PER 100 WBC
PDW BLD-RTO: 13.4 % (ref 11.8–14.4)
PLATELET # BLD AUTO: 170 K/UL (ref 138–453)
PMV BLD AUTO: 9.8 FL (ref 8.1–13.5)
POTASSIUM SERPL-SCNC: 3.6 MMOL/L (ref 3.7–5.3)
RBC # BLD: 3.82 M/UL (ref 3.95–5.11)
SODIUM SERPL-SCNC: 135 MMOL/L (ref 135–144)
TRIGL SERPL-MCNC: 277 MG/DL
TSH SERPL-ACNC: 1.17 UIU/ML (ref 0.3–5)
WBC # BLD AUTO: 10.8 K/UL (ref 3.5–11.3)

## 2023-05-03 PROCEDURE — 83036 HEMOGLOBIN GLYCOSYLATED A1C: CPT

## 2023-05-03 PROCEDURE — 84450 TRANSFERASE (AST) (SGOT): CPT

## 2023-05-03 PROCEDURE — 84443 ASSAY THYROID STIM HORMONE: CPT

## 2023-05-03 PROCEDURE — 80061 LIPID PANEL: CPT

## 2023-05-03 PROCEDURE — 80048 BASIC METABOLIC PNL TOTAL CA: CPT

## 2023-05-03 PROCEDURE — 85027 COMPLETE CBC AUTOMATED: CPT

## 2023-05-03 PROCEDURE — 36415 COLL VENOUS BLD VENIPUNCTURE: CPT

## 2023-05-03 PROCEDURE — 84460 ALANINE AMINO (ALT) (SGPT): CPT

## 2023-05-04 LAB
EST. AVERAGE GLUCOSE BLD GHB EST-MCNC: 108 MG/DL
HBA1C MFR BLD: 5.4 % (ref 4–6)

## 2023-05-08 ENCOUNTER — OFFICE VISIT (OUTPATIENT)
Dept: PRIMARY CARE CLINIC | Age: 28
End: 2023-05-08
Payer: COMMERCIAL

## 2023-05-08 VITALS
WEIGHT: 173 LBS | HEART RATE: 58 BPM | DIASTOLIC BLOOD PRESSURE: 70 MMHG | HEIGHT: 62 IN | BODY MASS INDEX: 31.83 KG/M2 | SYSTOLIC BLOOD PRESSURE: 106 MMHG | RESPIRATION RATE: 18 BRPM | TEMPERATURE: 98.2 F | OXYGEN SATURATION: 98 %

## 2023-05-08 DIAGNOSIS — E78.2 MODERATE MIXED HYPERLIPIDEMIA NOT REQUIRING STATIN THERAPY: ICD-10-CM

## 2023-05-08 DIAGNOSIS — Z82.49 FAMILY HISTORY OF CORONARY ARTERY DISEASE IN PATERNAL GRANDFATHER: ICD-10-CM

## 2023-05-08 DIAGNOSIS — Z3A.38 38 WEEKS GESTATION OF PREGNANCY: ICD-10-CM

## 2023-05-08 DIAGNOSIS — Z00.00 WELLNESS EXAMINATION: Primary | ICD-10-CM

## 2023-05-08 PROCEDURE — 99395 PREV VISIT EST AGE 18-39: CPT | Performed by: NURSE PRACTITIONER

## 2023-05-08 SDOH — ECONOMIC STABILITY: HOUSING INSECURITY
IN THE LAST 12 MONTHS, WAS THERE A TIME WHEN YOU DID NOT HAVE A STEADY PLACE TO SLEEP OR SLEPT IN A SHELTER (INCLUDING NOW)?: NO

## 2023-05-08 SDOH — ECONOMIC STABILITY: FOOD INSECURITY: WITHIN THE PAST 12 MONTHS, THE FOOD YOU BOUGHT JUST DIDN'T LAST AND YOU DIDN'T HAVE MONEY TO GET MORE.: NEVER TRUE

## 2023-05-08 SDOH — ECONOMIC STABILITY: INCOME INSECURITY: HOW HARD IS IT FOR YOU TO PAY FOR THE VERY BASICS LIKE FOOD, HOUSING, MEDICAL CARE, AND HEATING?: NOT HARD AT ALL

## 2023-05-08 SDOH — ECONOMIC STABILITY: FOOD INSECURITY: WITHIN THE PAST 12 MONTHS, YOU WORRIED THAT YOUR FOOD WOULD RUN OUT BEFORE YOU GOT MONEY TO BUY MORE.: NEVER TRUE

## 2023-05-08 ASSESSMENT — ENCOUNTER SYMPTOMS
SINUS PAIN: 0
COUGH: 0
SORE THROAT: 0
SINUS PRESSURE: 0
DIARRHEA: 0
RHINORRHEA: 0
ABDOMINAL PAIN: 0
CONSTIPATION: 0
SHORTNESS OF BREATH: 0

## 2023-05-08 ASSESSMENT — PATIENT HEALTH QUESTIONNAIRE - PHQ9
SUM OF ALL RESPONSES TO PHQ9 QUESTIONS 1 & 2: 0
SUM OF ALL RESPONSES TO PHQ QUESTIONS 1-9: 0
2. FEELING DOWN, DEPRESSED OR HOPELESS: 0
SUM OF ALL RESPONSES TO PHQ QUESTIONS 1-9: 0
1. LITTLE INTEREST OR PLEASURE IN DOING THINGS: 0

## 2023-05-08 NOTE — PATIENT INSTRUCTIONS
SURVEY:    You may be receiving a survey from [employer] regarding your visit today. Please complete the survey to enable us to provide the highest quality of care to you and your family. If you cannot score us a very good on any question, please call the office to discuss how we could of made your experience a very good one. Thank you.

## 2023-05-08 NOTE — PROGRESS NOTES
Name: Blake Christy  : 1995         Chief Complaint:     Chief Complaint   Patient presents with    Annual Exam     Wellness        History of Present Illness:      Blake Christy is a 32 y.o.  female who presents with Annual Exam (Wellness )      HPI    Wellness: The patient presents for wellness visit. She admits well balanced diet. 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 She admits routine eye exams. She admits routine dental exams. She is not vaccinated for covid. Most recent pneumococcal vaccine unknown. Most recent tetanus vaccine 2020. Pap smear 2022 benign. She denies family history of colorectal cancer. She denies family history of breast cancer. Hyperlipidemia:  Current treatment includes none. 5/3/2023 total cholesterol 269, , triglycerides 277. Admits father with HLD and paternal grandfather with CAD/MI. Pregnancy:  Currently 38 weeks 4 days gestation. Following with Janene Higginbotham CNM at [facility]. Past Medical History:     Past Medical History:   Diagnosis Date    Abnormal Pap smear of cervix 2018    Acid reflux     Asthma       Reviewed all health maintenance requirements and ordered appropriate tests  Health Maintenance Due   Topic Date Due    COVID-19 Vaccine (1) Never done    Pneumococcal 0-64 years Vaccine (1 - PCV) Never done       Past Surgical History:     Past Surgical History:   Procedure Laterality Date    LEEP  2022    Dr. Diana OLIVER N/A 2022    LEEP performed by Jacky Wright DO at [address]        Medications:       Prior to Admission medications    Medication Sig Start Date End Date Taking? Authorizing Provider   [facility].  Devices (BREAST PUMP) MIS Medela double pump - plans breastfeeding 23  Yes FAUSTINA Adams CNM   omeprazole (PRILOSEC) 20 MG delayed release capsule Take 1 capsule by mouth every morning (before breakfast) 22  Yes FAUSTINA Saunders - CNP

## 2023-05-09 ENCOUNTER — ROUTINE PRENATAL (OUTPATIENT)
Dept: OBGYN | Age: 28
End: 2023-05-09

## 2023-05-09 VITALS — BODY MASS INDEX: 31.75 KG/M2 | DIASTOLIC BLOOD PRESSURE: 68 MMHG | WEIGHT: 173.6 LBS | SYSTOLIC BLOOD PRESSURE: 120 MMHG

## 2023-05-09 DIAGNOSIS — Z34.83 ENCOUNTER FOR SUPERVISION OF OTHER NORMAL PREGNANCY IN THIRD TRIMESTER: Primary | ICD-10-CM

## 2023-05-09 DIAGNOSIS — Z3A.38 38 WEEKS GESTATION OF PREGNANCY: ICD-10-CM

## 2023-05-15 ENCOUNTER — ROUTINE PRENATAL (OUTPATIENT)
Dept: OBGYN | Age: 28
End: 2023-05-15

## 2023-05-15 VITALS — SYSTOLIC BLOOD PRESSURE: 122 MMHG | DIASTOLIC BLOOD PRESSURE: 66 MMHG | BODY MASS INDEX: 31.75 KG/M2 | WEIGHT: 173.6 LBS

## 2023-05-15 DIAGNOSIS — Z3A.39 39 WEEKS GESTATION OF PREGNANCY: ICD-10-CM

## 2023-05-15 DIAGNOSIS — Z3A.39 39 WEEKS GESTATION OF PREGNANCY: Primary | ICD-10-CM

## 2023-05-15 DIAGNOSIS — Z34.83 ENCOUNTER FOR SUPERVISION OF OTHER NORMAL PREGNANCY IN THIRD TRIMESTER: Primary | ICD-10-CM

## 2023-05-15 PROCEDURE — 0502F SUBSEQUENT PRENATAL CARE: CPT | Performed by: ADVANCED PRACTICE MIDWIFE

## 2023-05-15 NOTE — PROGRESS NOTES
Margaretann Goltz is here at 39w4d for:    Chief Complaint   Patient presents with    Routine Prenatal Visit     Cramps off and on. Estimated Due Date: Estimated Date of Delivery: 23    OB History    Para Term  AB Living   2 1 1     1   SAB IAB Ectopic Molar Multiple Live Births           0 1      # Outcome Date GA Lbr Phillip/2nd Weight Sex Delivery Anes PTL Lv   2 Current            1 Term 10/18/20 39w0d / 00:25 6 lb 2 oz (2.778 kg) M Vag-Spont Local N ZENON      Complications: Precipitous Labor        Past Medical History:   Diagnosis Date    Abnormal Pap smear of cervix 2018    Acid reflux     Asthma        Past Surgical History:   Procedure Laterality Date    LEEP  2022    Dr. Vieira Greek LEEP N/A 2022    LEEP performed by Dayron Rivera DO at [address] [place] [address] History     Tobacco Use   Smoking Status Former    Types: Cigarettes    Quit date: 3/3/2020    Years since quitting: 3.2   Smokeless Tobacco Never        Social History     Substance and Sexual Activity   Alcohol Use Not Currently       No results found for this visit on 05/15/23. HPI: here for routine ob visit, denies c/o     PT denies fever, chills, nausea and vomiting       Vitals:  Estimated body mass index is 31.75 kg/m² as calculated from the following:    Height as of 23: 5' 2\" (1.575 m). Weight as of this encounter: 173 lb 9.6 oz (78.7 kg). BP: 122/66  Weight - Scale: 173 lb 9.6 oz (78.7 kg)  Patient Position: Sitting  Albumin: Negative  Glucose: Negative  Movement: Present           Abdomen: gravid,soft, nontender    Results reviewed today:    No results found for this visit on 05/15/23. ASSESSMENT & Plan    Diagnosis Orders   1. Encounter for supervision of other normal pregnancy in third trimester        2. 39 weeks gestation of pregnancy                  I am having Aurelia Sanders maintain her Prenatal MV-Min-Fe Fum-FA-DHA (PRENATAL 1 PO), montelukast, omeprazole, and

## 2023-05-17 ENCOUNTER — HOSPITAL ENCOUNTER (OUTPATIENT)
Dept: ULTRASOUND IMAGING | Age: 28
Discharge: HOME | End: 2023-05-19
Payer: COMMERCIAL

## 2023-05-17 ENCOUNTER — HOSPITAL ENCOUNTER (OUTPATIENT)
Age: 28
Discharge: HOME | End: 2023-05-18
Attending: MIDWIFE | Admitting: MIDWIFE
Payer: COMMERCIAL

## 2023-05-17 VITALS
SYSTOLIC BLOOD PRESSURE: 119 MMHG | RESPIRATION RATE: 16 BRPM | DIASTOLIC BLOOD PRESSURE: 73 MMHG | HEART RATE: 81 BPM | TEMPERATURE: 97.5 F

## 2023-05-17 DIAGNOSIS — Z3A.39 39 WEEKS GESTATION OF PREGNANCY: ICD-10-CM

## 2023-05-17 PROBLEM — O47.9 UTERINE CONTRACTIONS: Status: ACTIVE | Noted: 2023-05-17

## 2023-05-17 LAB
BILIRUB UR QL STRIP: NEGATIVE
CLARITY UR: CLEAR
COLOR UR: YELLOW
GLUCOSE UR STRIP.AUTO-MCNC: NEGATIVE MG/DL
HGB UR QL STRIP.AUTO: NEGATIVE
KETONES UR STRIP.AUTO-MCNC: NEGATIVE MG/DL
LEUKOCYTE ESTERASE UR QL STRIP: NEGATIVE
NITRITE UR QL STRIP: NEGATIVE
PROT UR STRIP-MCNC: NEGATIVE MG/DL
PROT UR STRIP.AUTO-MCNC: 6.5 MG/DL (ref 5–9)
SP GR UR STRIP.AUTO: 1.01 (ref 1.01–1.02)
UROBILINOGEN UR STRIP-ACNC: NORMAL

## 2023-05-17 PROCEDURE — 81003 URINALYSIS AUTO W/O SCOPE: CPT

## 2023-05-17 PROCEDURE — 76805 OB US >/= 14 WKS SNGL FETUS: CPT

## 2023-05-18 ENCOUNTER — ROUTINE PRENATAL (OUTPATIENT)
Dept: OBGYN | Age: 28
End: 2023-05-18
Payer: COMMERCIAL

## 2023-05-18 ENCOUNTER — HOSPITAL ENCOUNTER (INPATIENT)
Age: 28
LOS: 1 days | Discharge: HOME | End: 2023-05-18
Attending: ADVANCED PRACTICE MIDWIFE | Admitting: ADVANCED PRACTICE MIDWIFE
Payer: COMMERCIAL

## 2023-05-18 ENCOUNTER — HOSPITAL ENCOUNTER (INPATIENT)
Age: 28
LOS: 2 days | Discharge: HOME | End: 2023-05-20
Attending: ADVANCED PRACTICE MIDWIFE | Admitting: ADVANCED PRACTICE MIDWIFE
Payer: COMMERCIAL

## 2023-05-18 VITALS
HEART RATE: 96 BPM | TEMPERATURE: 98.2 F | RESPIRATION RATE: 16 BRPM | DIASTOLIC BLOOD PRESSURE: 71 MMHG | SYSTOLIC BLOOD PRESSURE: 111 MMHG

## 2023-05-18 VITALS — SYSTOLIC BLOOD PRESSURE: 122 MMHG | DIASTOLIC BLOOD PRESSURE: 72 MMHG

## 2023-05-18 DIAGNOSIS — Z34.83 ENCOUNTER FOR SUPERVISION OF OTHER NORMAL PREGNANCY IN THIRD TRIMESTER: ICD-10-CM

## 2023-05-18 DIAGNOSIS — Z3A.40 40 WEEKS GESTATION OF PREGNANCY: Primary | ICD-10-CM

## 2023-05-18 PROBLEM — Z37.9 NORMAL LABOR: Status: ACTIVE | Noted: 2023-05-18

## 2023-05-18 LAB
ABO + RH BLD: NORMAL
ANTIBODY IDENTIFICATION: NORMAL
ARM BAND NUMBER: NORMAL
BASOPHILS # BLD: 0.03 K/UL (ref 0–0.2)
BASOPHILS NFR BLD: 0 % (ref 0–2)
BLOOD GROUP ANTIBODIES SERPL: POSITIVE
EOSINOPHIL # BLD: 0.05 K/UL (ref 0–0.44)
EOSINOPHILS RELATIVE PERCENT: 1 % (ref 1–4)
ERYTHROCYTE [DISTWIDTH] IN BLOOD BY AUTOMATED COUNT: 13.5 % (ref 11.8–14.4)
EXPIRATION DATE: NORMAL
HCT VFR BLD AUTO: 39.3 % (ref 36.3–47.1)
HGB BLD-MCNC: 13.3 G/DL (ref 11.9–15.1)
IMM GRANULOCYTES # BLD AUTO: 0.06 K/UL (ref 0–0.3)
IMM GRANULOCYTES NFR BLD: 1 %
LYMPHOCYTES # BLD: 20 % (ref 24–43)
LYMPHOCYTES NFR BLD: 2.03 K/UL (ref 1.1–3.7)
MCH RBC QN AUTO: 33.5 PG (ref 25.2–33.5)
MCHC RBC AUTO-ENTMCNC: 33.8 G/DL (ref 28.4–34.8)
MCV RBC AUTO: 99 FL (ref 82.6–102.9)
MONOCYTES NFR BLD: 0.5 K/UL (ref 0.1–1.2)
MONOCYTES NFR BLD: 5 % (ref 3–12)
NEUTROPHILS NFR BLD: 73 % (ref 36–65)
NEUTS SEG NFR BLD: 7.7 K/UL (ref 1.5–8.1)
NRBC AUTOMATED: 0 PER 100 WBC
PLATELET # BLD AUTO: 199 K/UL (ref 138–453)
PMV BLD AUTO: 9.5 FL (ref 8.1–13.5)
RBC # BLD AUTO: 3.97 M/UL (ref 3.95–5.11)
WBC OTHER # BLD: 10.4 K/UL (ref 3.5–11.3)

## 2023-05-18 PROCEDURE — 86901 BLOOD TYPING SEROLOGIC RH(D): CPT

## 2023-05-18 PROCEDURE — 1220000000 HC SEMI PRIVATE OB R&B

## 2023-05-18 PROCEDURE — 6370000000 HC RX 637 (ALT 250 FOR IP): Performed by: ADVANCED PRACTICE MIDWIFE

## 2023-05-18 PROCEDURE — 6360000002 HC RX W HCPCS: Performed by: ADVANCED PRACTICE MIDWIFE

## 2023-05-18 PROCEDURE — 85025 COMPLETE CBC W/AUTO DIFF WBC: CPT

## 2023-05-18 PROCEDURE — 86900 BLOOD TYPING SEROLOGIC ABO: CPT

## 2023-05-18 PROCEDURE — 99215 OFFICE O/P EST HI 40 MIN: CPT

## 2023-05-18 PROCEDURE — 59025 FETAL NON-STRESS TEST: CPT

## 2023-05-18 PROCEDURE — 86850 RBC ANTIBODY SCREEN: CPT

## 2023-05-18 PROCEDURE — 7200000001 HC VAGINAL DELIVERY

## 2023-05-18 PROCEDURE — 6370000000 HC RX 637 (ALT 250 FOR IP): Performed by: MIDWIFE

## 2023-05-18 PROCEDURE — 36415 COLL VENOUS BLD VENIPUNCTURE: CPT

## 2023-05-18 PROCEDURE — 86870 RBC ANTIBODY IDENTIFICATION: CPT

## 2023-05-18 RX ORDER — SODIUM CHLORIDE, SODIUM LACTATE, POTASSIUM CHLORIDE, AND CALCIUM CHLORIDE .6; .31; .03; .02 G/100ML; G/100ML; G/100ML; G/100ML
1000 INJECTION, SOLUTION INTRAVENOUS PRN
Status: DISCONTINUED | OUTPATIENT
Start: 2023-05-18 | End: 2023-05-18 | Stop reason: HOSPADM

## 2023-05-18 RX ORDER — HYDROXYZINE PAMOATE 50 MG/1
50 CAPSULE ORAL 3 TIMES DAILY PRN
Status: DISCONTINUED | OUTPATIENT
Start: 2023-05-18 | End: 2023-05-18

## 2023-05-18 RX ORDER — ACETAMINOPHEN 325 MG/1
650 TABLET ORAL EVERY 4 HOURS PRN
Status: DISCONTINUED | OUTPATIENT
Start: 2023-05-18 | End: 2023-05-18

## 2023-05-18 RX ORDER — SODIUM CHLORIDE, SODIUM LACTATE, POTASSIUM CHLORIDE, AND CALCIUM CHLORIDE .6; .31; .03; .02 G/100ML; G/100ML; G/100ML; G/100ML
1000 INJECTION, SOLUTION INTRAVENOUS PRN
Status: DISCONTINUED | OUTPATIENT
Start: 2023-05-18 | End: 2023-05-18

## 2023-05-18 RX ORDER — SODIUM CHLORIDE, SODIUM LACTATE, POTASSIUM CHLORIDE, CALCIUM CHLORIDE 600; 310; 30; 20 MG/100ML; MG/100ML; MG/100ML; MG/100ML
INJECTION, SOLUTION INTRAVENOUS CONTINUOUS
Status: DISCONTINUED | OUTPATIENT
Start: 2023-05-18 | End: 2023-05-18

## 2023-05-18 RX ORDER — TRANEXAMIC ACID 10 MG/ML
1000 INJECTION, SOLUTION INTRAVENOUS
Status: DISCONTINUED | OUTPATIENT
Start: 2023-05-18 | End: 2023-05-18

## 2023-05-18 RX ORDER — MISOPROSTOL 100 UG/1
800 TABLET ORAL PRN
Status: DISCONTINUED | OUTPATIENT
Start: 2023-05-18 | End: 2023-05-20 | Stop reason: HOSPADM

## 2023-05-18 RX ORDER — SODIUM CHLORIDE, SODIUM LACTATE, POTASSIUM CHLORIDE, CALCIUM CHLORIDE 600; 310; 30; 20 MG/100ML; MG/100ML; MG/100ML; MG/100ML
INJECTION, SOLUTION INTRAVENOUS CONTINUOUS
Status: DISCONTINUED | OUTPATIENT
Start: 2023-05-18 | End: 2023-05-18 | Stop reason: HOSPADM

## 2023-05-18 RX ORDER — SEVOFLURANE 250 ML/250ML
1 LIQUID RESPIRATORY (INHALATION) CONTINUOUS PRN
Status: DISCONTINUED | OUTPATIENT
Start: 2023-05-18 | End: 2023-05-18 | Stop reason: HOSPADM

## 2023-05-18 RX ORDER — IBUPROFEN 800 MG/1
800 TABLET ORAL EVERY 8 HOURS PRN
Status: DISCONTINUED | OUTPATIENT
Start: 2023-05-18 | End: 2023-05-20 | Stop reason: HOSPADM

## 2023-05-18 RX ORDER — CARBOPROST TROMETHAMINE 250 UG/ML
250 INJECTION, SOLUTION INTRAMUSCULAR PRN
Status: DISCONTINUED | OUTPATIENT
Start: 2023-05-18 | End: 2023-05-20 | Stop reason: HOSPADM

## 2023-05-18 RX ORDER — MODIFIED LANOLIN
OINTMENT (GRAM) TOPICAL PRN
Status: DISCONTINUED | OUTPATIENT
Start: 2023-05-18 | End: 2023-05-20 | Stop reason: HOSPADM

## 2023-05-18 RX ORDER — SEVOFLURANE 250 ML/250ML
1 LIQUID RESPIRATORY (INHALATION) CONTINUOUS PRN
Status: DISCONTINUED | OUTPATIENT
Start: 2023-05-18 | End: 2023-05-18

## 2023-05-18 RX ORDER — MISOPROSTOL 100 UG/1
200 TABLET ORAL PRN
Status: DISCONTINUED | OUTPATIENT
Start: 2023-05-18 | End: 2023-05-20 | Stop reason: HOSPADM

## 2023-05-18 RX ORDER — SODIUM CHLORIDE 0.9 % (FLUSH) 0.9 %
5-40 SYRINGE (ML) INJECTION PRN
Status: DISCONTINUED | OUTPATIENT
Start: 2023-05-18 | End: 2023-05-20 | Stop reason: HOSPADM

## 2023-05-18 RX ORDER — BUTORPHANOL TARTRATE 1 MG/ML
1 INJECTION, SOLUTION INTRAMUSCULAR; INTRAVENOUS
Status: DISCONTINUED | OUTPATIENT
Start: 2023-05-18 | End: 2023-05-18 | Stop reason: HOSPADM

## 2023-05-18 RX ORDER — BUTORPHANOL TARTRATE 1 MG/ML
1 INJECTION, SOLUTION INTRAMUSCULAR; INTRAVENOUS
Status: DISCONTINUED | OUTPATIENT
Start: 2023-05-18 | End: 2023-05-18

## 2023-05-18 RX ORDER — NALBUPHINE HCL 10 MG/ML
10 AMPUL (ML) INJECTION
Status: DISCONTINUED | OUTPATIENT
Start: 2023-05-18 | End: 2023-05-18

## 2023-05-18 RX ORDER — BUTORPHANOL TARTRATE 1 MG/ML
0.5 INJECTION, SOLUTION INTRAMUSCULAR; INTRAVENOUS
Status: DISCONTINUED | OUTPATIENT
Start: 2023-05-18 | End: 2023-05-18

## 2023-05-18 RX ORDER — METHYLERGONOVINE MALEATE 0.2 MG/ML
200 INJECTION INTRAVENOUS PRN
Status: DISCONTINUED | OUTPATIENT
Start: 2023-05-18 | End: 2023-05-20 | Stop reason: HOSPADM

## 2023-05-18 RX ORDER — CARBOPROST TROMETHAMINE 250 UG/ML
250 INJECTION, SOLUTION INTRAMUSCULAR PRN
Status: DISCONTINUED | OUTPATIENT
Start: 2023-05-18 | End: 2023-05-18 | Stop reason: HOSPADM

## 2023-05-18 RX ORDER — MISOPROSTOL 100 UG/1
900 TABLET ORAL PRN
Status: DISCONTINUED | OUTPATIENT
Start: 2023-05-18 | End: 2023-05-18 | Stop reason: HOSPADM

## 2023-05-18 RX ORDER — NALBUPHINE HCL 10 MG/ML
10 AMPUL (ML) INJECTION
Status: DISCONTINUED | OUTPATIENT
Start: 2023-05-18 | End: 2023-05-18 | Stop reason: HOSPADM

## 2023-05-18 RX ORDER — SODIUM CHLORIDE 9 MG/ML
INJECTION, SOLUTION INTRAVENOUS PRN
Status: DISCONTINUED | OUTPATIENT
Start: 2023-05-18 | End: 2023-05-20 | Stop reason: HOSPADM

## 2023-05-18 RX ORDER — ACETAMINOPHEN 500 MG
1000 TABLET ORAL EVERY 8 HOURS PRN
Status: DISCONTINUED | OUTPATIENT
Start: 2023-05-18 | End: 2023-05-20 | Stop reason: HOSPADM

## 2023-05-18 RX ORDER — MISOPROSTOL 100 UG/1
900 TABLET ORAL PRN
Status: DISCONTINUED | OUTPATIENT
Start: 2023-05-18 | End: 2023-05-20 | Stop reason: HOSPADM

## 2023-05-18 RX ORDER — ACETAMINOPHEN 325 MG/1
650 TABLET ORAL EVERY 4 HOURS PRN
Status: DISCONTINUED | OUTPATIENT
Start: 2023-05-18 | End: 2023-05-18 | Stop reason: HOSPADM

## 2023-05-18 RX ORDER — SODIUM CHLORIDE 9 MG/ML
25 INJECTION, SOLUTION INTRAVENOUS PRN
Status: DISCONTINUED | OUTPATIENT
Start: 2023-05-18 | End: 2023-05-18

## 2023-05-18 RX ORDER — TRANEXAMIC ACID 10 MG/ML
1000 INJECTION, SOLUTION INTRAVENOUS
Status: DISCONTINUED | OUTPATIENT
Start: 2023-05-18 | End: 2023-05-18 | Stop reason: HOSPADM

## 2023-05-18 RX ORDER — ONDANSETRON 2 MG/ML
4 INJECTION INTRAMUSCULAR; INTRAVENOUS EVERY 6 HOURS PRN
Status: DISCONTINUED | OUTPATIENT
Start: 2023-05-18 | End: 2023-05-18 | Stop reason: HOSPADM

## 2023-05-18 RX ORDER — SODIUM CHLORIDE, SODIUM LACTATE, POTASSIUM CHLORIDE, AND CALCIUM CHLORIDE .6; .31; .03; .02 G/100ML; G/100ML; G/100ML; G/100ML
500 INJECTION, SOLUTION INTRAVENOUS PRN
Status: DISCONTINUED | OUTPATIENT
Start: 2023-05-18 | End: 2023-05-18 | Stop reason: HOSPADM

## 2023-05-18 RX ORDER — METHYLERGONOVINE MALEATE 0.2 MG/ML
200 INJECTION INTRAVENOUS PRN
Status: DISCONTINUED | OUTPATIENT
Start: 2023-05-18 | End: 2023-05-18 | Stop reason: HOSPADM

## 2023-05-18 RX ORDER — SODIUM CHLORIDE, SODIUM LACTATE, POTASSIUM CHLORIDE, AND CALCIUM CHLORIDE .6; .31; .03; .02 G/100ML; G/100ML; G/100ML; G/100ML
500 INJECTION, SOLUTION INTRAVENOUS PRN
Status: DISCONTINUED | OUTPATIENT
Start: 2023-05-18 | End: 2023-05-18

## 2023-05-18 RX ORDER — DOCUSATE SODIUM 100 MG/1
100 CAPSULE, LIQUID FILLED ORAL 2 TIMES DAILY PRN
Status: DISCONTINUED | OUTPATIENT
Start: 2023-05-18 | End: 2023-05-20 | Stop reason: HOSPADM

## 2023-05-18 RX ORDER — SODIUM CHLORIDE 9 MG/ML
25 INJECTION, SOLUTION INTRAVENOUS PRN
Status: DISCONTINUED | OUTPATIENT
Start: 2023-05-18 | End: 2023-05-18 | Stop reason: HOSPADM

## 2023-05-18 RX ORDER — HYDROXYZINE HYDROCHLORIDE 25 MG/1
25 TABLET, FILM COATED ORAL
Status: DISCONTINUED | OUTPATIENT
Start: 2023-05-18 | End: 2023-05-18

## 2023-05-18 RX ORDER — ONDANSETRON 2 MG/ML
4 INJECTION INTRAMUSCULAR; INTRAVENOUS EVERY 6 HOURS PRN
Status: DISCONTINUED | OUTPATIENT
Start: 2023-05-18 | End: 2023-05-18

## 2023-05-18 RX ORDER — HYDROXYZINE PAMOATE 50 MG/1
50 CAPSULE ORAL ONCE
Status: COMPLETED | OUTPATIENT
Start: 2023-05-18 | End: 2023-05-18

## 2023-05-18 RX ORDER — SODIUM CHLORIDE 0.9 % (FLUSH) 0.9 %
5-40 SYRINGE (ML) INJECTION PRN
Status: DISCONTINUED | OUTPATIENT
Start: 2023-05-18 | End: 2023-05-18

## 2023-05-18 RX ORDER — SODIUM CHLORIDE 0.9 % (FLUSH) 0.9 %
5-40 SYRINGE (ML) INJECTION PRN
Status: DISCONTINUED | OUTPATIENT
Start: 2023-05-18 | End: 2023-05-18 | Stop reason: HOSPADM

## 2023-05-18 RX ORDER — SODIUM CHLORIDE 0.9 % (FLUSH) 0.9 %
5-40 SYRINGE (ML) INJECTION EVERY 12 HOURS SCHEDULED
Status: DISCONTINUED | OUTPATIENT
Start: 2023-05-18 | End: 2023-05-20 | Stop reason: HOSPADM

## 2023-05-18 RX ORDER — TRANEXAMIC ACID 10 MG/ML
1000 INJECTION, SOLUTION INTRAVENOUS
Status: ACTIVE | OUTPATIENT
Start: 2023-05-18 | End: 2023-05-19

## 2023-05-18 RX ADMIN — BUTORPHANOL TARTRATE 0.5 MG: 1 INJECTION, SOLUTION INTRAMUSCULAR; INTRAVENOUS at 17:21

## 2023-05-18 RX ADMIN — HYDROXYZINE PAMOATE 50 MG: 50 CAPSULE ORAL at 13:58

## 2023-05-18 RX ADMIN — BENZOCAINE AND LEVOMENTHOL: 200; 5 SPRAY TOPICAL at 19:03

## 2023-05-18 RX ADMIN — Medication: at 19:03

## 2023-05-18 RX ADMIN — IBUPROFEN 800 MG: 800 TABLET ORAL at 19:02

## 2023-05-18 RX ADMIN — HYDROXYZINE PAMOATE 50 MG: 50 CAPSULE ORAL at 00:33

## 2023-05-18 RX ADMIN — Medication: at 19:02

## 2023-05-18 ASSESSMENT — PAIN DESCRIPTION - DESCRIPTORS
DESCRIPTORS: CRAMPING
DESCRIPTORS: CRAMPING

## 2023-05-18 ASSESSMENT — PAIN SCALES - GENERAL
PAINLEVEL_OUTOF10: 8
PAINLEVEL_OUTOF10: 3

## 2023-05-18 ASSESSMENT — PAIN DESCRIPTION - ORIENTATION: ORIENTATION: MID

## 2023-05-18 ASSESSMENT — PAIN DESCRIPTION - LOCATION
LOCATION: ABDOMEN
LOCATION: ABDOMEN

## 2023-05-18 NOTE — DISCHARGE INSTRUCTIONS
Stop by [facility] office on your way home, after you leave the labor unit. Call your physicians office or notify your physician by paging them if you experience:    Signs of Pre-eclampsia:  Dizziness or severe headache  Spots before eyes or blurred vision  Epigastric pain / Right side abdominal pain  Swelling of face, hands, legs or feet not decreased with rest on left side.     Signs of infection:  Chills  Fever  Start of, or change in, vaginal discharge    Signs of labor:  Uterine contractions are regular and 5 minutes apart if 1st baby or 10 minutes apart if not 1st baby  Bag or yeung leaking or gush of fluid from vagina  Any vaginal bleeding that is heavier than a menstrual period  Intermittent low backache  Abdominal or menstrual like cramping that is constant or heavier, comes and goes  Vaginal pressure    Potential for dehydration:  Vomiting or diarrhea for several hours    Potential abnormal fetal well being signs:  Decrease or absence of baby movement

## 2023-05-18 NOTE — FLOWSHEET NOTE
Discharged to home. Pt notified she needs to stop at [employer] office on her way out.  AVS reviewed

## 2023-05-18 NOTE — H&P
exchange  Abdomen:  Soft, non tender, gravid, consistent with her gestational age, EFW by Luz's manouever was 8 1/2 lbs by Jose Angel Kohli this week   Fetal heart rate:  Reassuring. Pelvis:  Adequate pelvis  Cervix: 4 -5 cm 100% medium -1  Contraction frequency:  5 minutes    Membranes:  artificially Ruptured clear fluid    Labs: CBC:   Lab Results   Component Value Date/Time    WBC 10.4 2023 11:07 AM    RBC 3.97 2023 11:07 AM    HGB 13.3 2023 11:07 AM    HCT 39.3 2023 11:07 AM    MCV 99.0 2023 11:07 AM    MCH 33.5 2023 11:07 AM    MCHC 33.8 2023 11:07 AM    RDW 13.5 2023 11:07 AM     2023 11:07 AM    MPV 9.5 2023 11:07 AM       ASSESSMENT AND PLAN:    Estimated length of stay: 2 days    Principal Problem:    40 weeks gestation of pregnancy  Plan: admit, active labor      Labor: Admit, anticipate normal delivery, routine labor orders  Fetus: Reassuring, Cat 1  GBS: No  Other: anticipate       Sonia Flores.  MIGUEL Sanchez,2023 5:27 PM

## 2023-05-18 NOTE — FLOWSHEET NOTE
Patient arrived from home with complaint of stronger, more frequent contractions than she had last night. Patient was in [facility] department for evaluation late last evening with no cervical change. Patient says she has had some mucous discharge since that time as well. No vaginal leaking noted and amnio test neg. Noted bloody show with SVE. Patient denies bleeding or dysuria. VSS. Patient says she has some stronger contractions with short milder ones in between.

## 2023-05-18 NOTE — FLOWSHEET NOTE
Spoke with Dunia Johnson regarding patients desire to be augmented with cervadil or otherwise non-medically. Since there is no indications for use of cervadil that request was declined. Elizabeth new plan is to recheck cervix in 2 hours to assess if there was any change. If no change, patient will be discharged to home. Reviewed this with patient, patient is agreeable.

## 2023-05-18 NOTE — FLOWSHEET NOTE
Patient states she is interested in having labor augmentation with pitocin if MEG Crews agrees. Left message for Patricia Murray CNM at office to return phone call.

## 2023-05-18 NOTE — PROGRESS NOTES
Patient presents to L&D today for contractions. IUP at 40 weeks     NST is reactive. Quality of tracing is satisfactory.

## 2023-05-18 NOTE — FLOWSHEET NOTE
Received call back from [address] [place] [address] and reported reactive NSt and patient desires augmentation of labor with pitocin saying \"IF that's what it takes to get it done. \"  Orders received. Patient sitting straight up in bed.

## 2023-05-18 NOTE — FLOWSHEET NOTE
Pt arrives to [facility] unit with significant other and child complaining of frequent contractions and rating them a 5/10 on the pain scale. Pt states with her last baby she came into the hospital completely dilated and wanted to make sure. She also stated she had an ultrasound today to make sure baby was head down. Pt was a fingertip at her appointment with Kellee Chamberlain on Monday 5/15. Pt denies an vaginal bleeding or leaking of fluid.

## 2023-05-18 NOTE — FLOWSHEET NOTE
Spoke with dipika about patients updated cervical exam. Patient would like to go home and is requesting vistaril as it helped her rest last evening. Verbal order for vistaril 50 po taken. Verbal discharge order taken.  Krista Tobias requests RN to have patient stop at her office after she leaves the labor and delivery unit

## 2023-05-18 NOTE — FLOWSHEET NOTE
Talked with Fred Silvestre about patients request to get into shower and off of the EFM while doing so. Fred Silvestre said that is OK. RN will intermittently doppler/palpate while she is doing so.

## 2023-05-18 NOTE — FLOWSHEET NOTE
Patient arrives back to room 204 after stopping at [facility] office. Chantell Dc called and said she may be around to break water and that patients diet will be ice chips.

## 2023-05-18 NOTE — FLOWSHEET NOTE
Updated S. Hotelling on patients Hx, UA results,contraction pattern, GBS status, SVE (fingertip). New orders received.

## 2023-05-18 NOTE — DISCHARGE INSTRUCTIONS
Nonah Else Dr. Paris Lesches MD [place] Buggy Dr. Kenda Ali MD [address] [facility] [address] [phone]   or Teodora Goldberg [phone]     ACTIVITY LIMITATIONS:  (  )Up and about as desired and tolerated  (  )Up to bathroom only  (  )Caleen Kailash on either side  (  )Avoid heavy lifting or exercise  (  )No sex  (  )No nipple stimulation  (  )Complet bedrest  (  )Avoid using stairs  (  x)Increase fluids  **DRINK AT LEAST eight-8oz. Glasses of water daily. **    Call your Doctor if:  (  x)Contractions are every 5 minutes apart (from start of one to the start of the next contraction) lasting 60 seconds for at least 1 hour, strong enough you can not walk or talk through the contraction and regular. (  x)Bag of water breaks  ( x )Vaginal bleeding  (x  )Unusual pain occurs  (  x)Decreased fetal movement  ( x ) labor:  If you have 4 contractions in an hour    Keep your scheduled follow up appointment. Or call for a follow up on________________________________________________. IN CASE OF EMERGENCY CONTACT LABOR AND DELIVERY [phone].

## 2023-05-18 NOTE — PROGRESS NOTES
Sulma Boogie is here at 40w0d for:    Chief Complaint   Patient presents with    Routine Prenatal Visit     C/O cramps and irregular contractions for approx. 24 hours. Bleeding some red-brown. Estimated Due Date: Estimated Date of Delivery: 23    OB History    Para Term  AB Living   2 2 2     2   SAB IAB Ectopic Molar Multiple Live Births           0 2      # Outcome Date GA Lbr Phillip/2nd Weight Sex Delivery Anes PTL Lv   2 Term 23 40w0d / 00:34 7 lb 12.4 oz (3.526 kg) M Vag-Spont   ZENON   1 Term 10/18/20 39w0d / 00:25 6 lb 2 oz (2.778 kg) M Vag-Spont Local N ZENON      Complications: Precipitous Labor        Past Medical History:   Diagnosis Date    Abnormal Pap smear of cervix 2018    Acid reflux     Asthma        Past Surgical History:   Procedure Laterality Date    LEEP  2022    Dr. Bonnie Jasmine    LEEP N/A 2022    LEEP performed by [employer] DO Glenda at [address] [place] [address] History     Tobacco Use   Smoking Status Former    Types: Cigarettes    Quit date: 3/3/2020    Years since quitting: 3.2   Smokeless Tobacco Never        Social History     Substance and Sexual Activity   Alcohol Use Not Currently       No results found for this visit on 23. HPI: patient was observed in hospital without cervical change, I asked that when she was discharged she come to the office for further evaluation as there could be  some concern for cervical stenosis after her leep. She appears to be laboring and breathing heavy while she leans over counter     PT denies fever, chills, nausea and vomiting       Vitals:  Estimated body mass index is 31.75 kg/m² as calculated from the following:    Height as of 23: 5' 2\" (1.575 m). Weight as of 5/15/23: 173 lb 9.6 oz (78.7 kg).   BP: 122/72  Patient Position: Sitting  Fetal HR: 140  Movement: Present  Presentation: Vertex  Dilation (cm): 1  Effacement: 100  Station: -1           Abdomen: gravid, soft, nontender    Results

## 2023-05-18 NOTE — FLOWSHEET NOTE
Discharge instructions reviewed with patient. Patient verbalized understanding and denies any questions. Discharge papers signed and then given to patient. Patient discharged [phone] from unit accompanied by significant other with understanding of follow up care. No signs of distress noted.

## 2023-05-18 NOTE — FLOWSHEET NOTE
Verbal order from Lisa Santiago to give 0.5 mg of stadol. CNM checked patient at [phone] and gave the OK to continue with the 0.5 mg dose.

## 2023-05-19 PROCEDURE — 1220000000 HC SEMI PRIVATE OB R&B

## 2023-05-19 PROCEDURE — 6370000000 HC RX 637 (ALT 250 FOR IP): Performed by: ADVANCED PRACTICE MIDWIFE

## 2023-05-19 RX ADMIN — IBUPROFEN 800 MG: 800 TABLET ORAL at 17:16

## 2023-05-19 RX ADMIN — ACETAMINOPHEN 1000 MG: 500 TABLET, FILM COATED ORAL at 00:08

## 2023-05-19 RX ADMIN — IBUPROFEN 800 MG: 800 TABLET ORAL at 09:17

## 2023-05-19 RX ADMIN — IBUPROFEN 800 MG: 800 TABLET ORAL at 23:48

## 2023-05-19 ASSESSMENT — PAIN SCALES - GENERAL
PAINLEVEL_OUTOF10: 2
PAINLEVEL_OUTOF10: 2

## 2023-05-19 ASSESSMENT — PAIN - FUNCTIONAL ASSESSMENT: PAIN_FUNCTIONAL_ASSESSMENT: ACTIVITIES ARE NOT PREVENTED

## 2023-05-19 ASSESSMENT — PAIN DESCRIPTION - LOCATION: LOCATION: ABDOMEN

## 2023-05-19 ASSESSMENT — PAIN DESCRIPTION - DESCRIPTORS: DESCRIPTORS: CRAMPING

## 2023-05-19 NOTE — PLAN OF CARE
Problem: Pain  Goal: Verbalizes/displays adequate comfort level or baseline comfort level  2023 by Leslie Mcguire RN  Outcome: Progressing  2023 by Juanito Christie RN  Outcome: Progressing     Problem: Postpartum  Goal: Experiences normal postpartum course  Description:  Postpartum OB-Pregnancy care plan goal which identifies if the mother is experiencing a normal postpartum course  2023 by Leslie Mcguire RN  Outcome: Progressing  2023 by Juanito Christie RN  Outcome: Progressing  Goal: Appropriate maternal -  bonding  Description:  Postpartum OB-Pregnancy care plan goal which identifies if the mother and  are bonding appropriately  2023 by Leslie Mcguire RN  Outcome: Progressing  2023 by Juanito Christie RN  Outcome: Progressing  Goal: Establishment of infant feeding pattern  Description:  Postpartum OB-Pregnancy care plan goal which identifies if the mother is establishing a feeding pattern with their   2023 by Leslie Mcguire RN  Outcome: Progressing  2023 by Juanito Christie RN  Outcome: Progressing  Goal: Incisions, wounds, or drain sites healing without S/S of infection  2023 by Leslie Mcguire RN  Outcome: Progressing  2023 by Juanito Christie RN  Outcome: Progressing General: Patient in no apparent distress, AAO x 3  Skin: Dry. +abrasion over IP joint on R 1st digit  HEENT: Oral mucosa moist. No pharyngeal exudates or tonsillar enlargement  Eyes: Conjunctiva normal  Cardiac: Regular rhythm and rate. No edema  Respiratory: Lungs clear b/l and symmetric. No respiratory distress  Gastrointestinal: Abdomen soft, nondistended, nontender  Musculoskeletal: Moves all extremities spontaneously. NROM R 1st digit at IP joint  Neurological: alert and oriented to person, place and time. RUE distally neurovascularly intact  Psychiatric: Cooperative

## 2023-05-19 NOTE — DISCHARGE INSTRUCTIONS
does not help. BREAST CARE  Take medications as recommended by your doctor or midwife for pain  If you develop a warm, red, tender area on your breast or develop a fever contact your OB provider. For breastfeeding moms:  If you become engorged, feeding may be more difficult or painful for 1-2 days. You may find it helpful to hand express some milk so that the infant can latch on more easily. While breastfeeding, continue to take your prenatal vitamins as directed by your doctor or midwife. Refer to the breastfeeding booklet in the  folder/binder for more information. If you feel you need more assistance or have questions, please call Augie Naqvi lactation consultant, at [phone] or the OB department to schedule an appointment or phone consultation. For more FREE breastfeeding help, visit the Breastfeeding Support Group on Monday evenings at 7 pm in the [place] department. RANDOLPH CARE  Use the randolph-bottle after toileting until bleeding stops. Cleanse your perineum from front to back  If used, stitches will dissolve in 4-6 weeks. You may use a sitz bath or soak in a clean tub as needed for comfort. Kegel exercises will help restore bladder control. SWELLING  Try to keep your legs elevated when you are sitting. When lying down keep your legs elevated. When wearing stocking or socks, make sure they are not too tight. WHEN TO CALL THE DOCTOR  If you have a temp of 100.6 or more. If your bleeding has increased and you are saturating a pad in an hour. Your abdomen is tender to touch. You are passing blood clots bigger than the size of a lemon. If you are experiencing extreme weakness or dizziness. If you are having flu-like symptoms such as achy muscles or joints. There is a foul smell or a green color to your vaginal bleeding. If you have pain that cannot be relieved. You have persistent burning or frequency with urination.   Call if you have concerns about your

## 2023-05-19 NOTE — PROGRESS NOTES
Department of Obstetrics and Gynecology  Labor and Delivery       Post Partum Progress Note             SUBJECTIVE:  Pt states she is doing well this am. Noticing some cramping. Denies concerns. Plans to stay overnight. OBJECTIVE:      Vitals:  BP (!) 109/58   Pulse 83   Temp 98.1 °F (36.7 °C) (Oral)   Resp 20   LMP 2022   Breastfeeding Unknown   Patient Vitals for the past 24 hrs:   BP Temp Temp src Pulse Resp   23 0336 (!) 109/58 98.1 °F (36.7 °C) Oral 83 20   23 0001 (!) 105/59 98.3 °F (36.8 °C) Oral 94 23 110/61 -- -- 85 23 (!) 106/56 -- -- 85 23 116/65 -- -- 86 23 115/66 -- -- 80 23 194 (!) 113/57 -- -- (!) 105 23 1933 117/64 -- -- 76 23 191 (!) 111/58 -- -- 77 23 1902 124/68 -- -- 82 23 1848 122/64 -- -- 85 23 1833 116/77 98.2 °F (36.8 °C) -- 90 23 1718 119/79 -- -- 79 --   23 1600 118/81 -- -- 81 --   23 1506 116/72 98.2 °F (36.8 °C) -- 84 --       ABDOMEN: FFM @U  GENITAL/URINARY:  no concerns, light lochia  Cor: RRR no Murmurs  Pulmonary: clear to auscultation anterior and posterior  Extremities: no Clubbing cyanosis or ecchymosis  Psych: Appears to be bonding well with infant, appropriate affect. DATA:    CBC:   Lab Results   Component Value Date/Time    WBC 10.4 2023 11:07 AM    RBC 3.97 2023 11:07 AM    HGB 13.3 2023 11:07 AM    HCT 39.3 2023 11:07 AM    MCV 99.0 2023 11:07 AM    MCH 33.5 2023 11:07 AM    MCHC 33.8 2023 11:07 AM    RDW 13.5 2023 11:07 AM     2023 11:07 AM    MPV 9.5 2023 11:07 AM         ASSESSMENT :      Principal Problem:    40 weeks gestation of pregnancy      Plan:  Routine postpartum care. Anticipate discharge to home tomorrow.

## 2023-05-20 VITALS
RESPIRATION RATE: 16 BRPM | DIASTOLIC BLOOD PRESSURE: 59 MMHG | TEMPERATURE: 97.9 F | HEART RATE: 75 BPM | SYSTOLIC BLOOD PRESSURE: 106 MMHG

## 2023-05-20 PROCEDURE — 6370000000 HC RX 637 (ALT 250 FOR IP): Performed by: ADVANCED PRACTICE MIDWIFE

## 2023-05-20 RX ORDER — IBUPROFEN 800 MG/1
800 TABLET ORAL EVERY 8 HOURS PRN
Qty: 60 TABLET | Refills: 0 | Status: SHIPPED | OUTPATIENT
Start: 2023-05-20

## 2023-05-20 RX ADMIN — IBUPROFEN 800 MG: 800 TABLET ORAL at 07:43

## 2023-05-20 RX ADMIN — ACETAMINOPHEN 1000 MG: 500 TABLET, FILM COATED ORAL at 04:20

## 2023-05-20 ASSESSMENT — PAIN SCALES - GENERAL: PAINLEVEL_OUTOF10: 4

## 2023-05-20 NOTE — FLOWSHEET NOTE
Patient off unit in stable condition. Departure Mode: with significant other.     Mobility at Departure: wheelchair    Discharged to: private residence    Time of Discharge: 2156

## 2023-05-20 NOTE — PROGRESS NOTES
Department of Obstetrics and Gynecology  Labor and Delivery       Post Partum Progress Note             SUBJECTIVE:  Pt doing well this am. Desires discharge to home. Voices she has had no further clots since passing a lemon sized clot yesterday. States bleeding minimal. Taking motrin for pain. OBJECTIVE:      Vitals:  /66   Pulse 78   Temp 98 °F (36.7 °C) (Oral)   Resp 16   LMP 2022   Breastfeeding Unknown   Patient Vitals for the past 24 hrs:   BP Temp Temp src Pulse Resp   23 0345 110/66 98 °F (36.7 °C) Oral 78 16   23 1913 110/74 98.3 °F (36.8 °C) Oral 83 16   23 1618 122/66 97.7 °F (36.5 °C) Oral 83 16       ABDOMEN: FFM U/1  GENITAL/URINARY:  no concerns  Cor: RRR no Murmurs  Pulmonary: clear to auscultation anterior and posterior  Extremities: no Clubbing cyanosis or ecchymosis  Psych: Appears to be bonding well with infant, appropriate affect. DATA:    CBC:   Lab Results   Component Value Date/Time    WBC 10.4 2023 11:07 AM    RBC 3.97 2023 11:07 AM    HGB 13.3 2023 11:07 AM    HCT 39.3 2023 11:07 AM    MCV 99.0 2023 11:07 AM    MCH 33.5 2023 11:07 AM    MCHC 33.8 2023 11:07 AM    RDW 13.5 2023 11:07 AM     2023 11:07 AM    MPV 9.5 2023 11:07 AM         ASSESSMENT :      Principal Problem:    40 weeks gestation of pregnancy      Plan:  Routine postpartum care. Discharge to home. Reviewed warning signs. Rx called to pharmacy.

## 2023-05-20 NOTE — DISCHARGE SUMMARY
Obstetrical Discharge Form        Gestational Age:40w0d    Antepartum complications: none    Date of Delivery: 2023    Type of Delivery:        Delivered By:  Ruth WEEMS CNM    Assisted By:N/A      Baby: male    Anesthesia:  none      Intrapartum complications: None    Feeding method: breast    Blood type: O NEGATIVE      Rubella:    Rubella Antibody, IgG   Date Value Ref Range Status   10/06/2022 297.8 IU/mL Final     Comment:                 REFERENCE RANGE:  <5.0       NON-REACTIVE (non-immune)  5.0 TO 9.9 EQUIVOCAL  >=10.0     REACTIVE     (immune)             T. Pallidium, IGG:    T. pallidum, IgG   Date Value Ref Range Status   10/06/2022 NONREACTIVE NONREACTIVE Final     Comment:           T. pallidum antibodies are not detected. There is no serological evidence of infection with T. pallidum (early primary syphilis   cannot be excluded). Retest in 2-4 weeks if syphilis is clinically suspect. Hepatitis B Surface Antigen:   Hepatitis B Surface Ag   Date Value Ref Range Status   10/06/2022 NONREACTIVE NONREACTIVE Final     Comment:     TEST  CONFIRMED       HIV:  No results found for: [id number]    No results found for this visit on 23.       Postpartum complications: none    Discharge Medication:      Medication List        START taking these medications      ibuprofen 800 MG tablet  Commonly known as: ADVIL;MOTRIN  Take 1 tablet by mouth every 8 hours as needed for Pain            CONTINUE taking these medications      Breast Pump Misc  Medela double pump - plans breastfeeding     omeprazole 20 MG delayed release capsule  Commonly known as: PRILOSEC  Take 1 capsule by mouth every morning (before breakfast)     PRENATAL 1 PO            STOP taking these medications      montelukast 10 MG tablet  Commonly known as: SINGULAIR               Where to Get Your Medications        These medications were sent to [address] -

## 2023-05-22 RX ORDER — OMEPRAZOLE 20 MG/1
CAPSULE, DELAYED RELEASE ORAL
Qty: 90 CAPSULE | Refills: 0 | Status: SHIPPED | OUTPATIENT
Start: 2023-05-22

## 2023-06-01 ENCOUNTER — TELEPHONE (OUTPATIENT)
Dept: OBGYN | Age: 28
End: 2023-06-01

## 2023-06-01 NOTE — TELEPHONE ENCOUNTER
Post Partum Phone Call Vaginal delivery  Follow Up 2 weeks michael after discharge      Date of service: 6/1/2023    Danielle Smith  Is a 32 y.o. Delivery date 5/18/23    Type of delivery:  Spontaneous vaginal delivery    Laceration:    Infant gender: male    Infant name: Chuck    Are you breast or bottle feeding? Breast     How did first pediatrician visit go: good     How are you feeling: Good     How is your bleeding: very minimal     Any questions or concerns: No     Are you on any medications for depression. no    Information given to pt. Pt is doing well, feeling well and stated baby is doing well. EPPDS:   Postpartum Depression Scale 11/30/2020   I have been able to laugh and see the funny side of things As much as I always could   I have looked forward with enjoyment to things As much as I ever did   I have blamed myself unnecessarily when things went wrong Not very often   I have been anxious or worried for no good reason No, not at all   I have felt scared or panicky for no good reason No, not at all   I haven't been able to cope lately No, I have been coping as well as ever   I have been so unhappy that I have had difficulty sleeping Not at all   I have felt sad or miserable No, not at all   I have been so unhappy that I have been crying No, never   The thought of harming myself has occurred to me Never   Total 1       If above 10 schedule pt and appointment. Within a few days    Lets schedule your appt now -   Date - 6/29/23   What for - 6 wk pp    History of thyroid issues? No  If yes order TSH with reflux to have done in lab within a week

## 2023-06-29 ENCOUNTER — POSTPARTUM VISIT (OUTPATIENT)
Dept: OBGYN | Age: 28
End: 2023-06-29

## 2023-06-29 VITALS
BODY MASS INDEX: 28.71 KG/M2 | DIASTOLIC BLOOD PRESSURE: 76 MMHG | WEIGHT: 156 LBS | SYSTOLIC BLOOD PRESSURE: 118 MMHG | HEIGHT: 62 IN

## 2023-06-29 DIAGNOSIS — N92.6 IRREGULAR MENSES: ICD-10-CM

## 2023-06-29 PROCEDURE — 0503F POSTPARTUM CARE VISIT: CPT | Performed by: ADVANCED PRACTICE MIDWIFE

## 2023-06-29 RX ORDER — ACETAMINOPHEN AND CODEINE PHOSPHATE 120; 12 MG/5ML; MG/5ML
1 SOLUTION ORAL DAILY
Qty: 28 TABLET | Refills: 6 | Status: SHIPPED | OUTPATIENT
Start: 2023-06-29

## 2023-06-29 RX ORDER — MONTELUKAST SODIUM 10 MG/1
10 TABLET ORAL NIGHTLY
COMMUNITY

## 2023-07-04 ENCOUNTER — TELEPHONE (OUTPATIENT)
Dept: OBGYN | Age: 28
End: 2023-07-04

## 2023-07-04 NOTE — TELEPHONE ENCOUNTER
This chart needs addressed, the patient was in on the same day twice, the second time she stayed and was delivered. So its the admission from around 1530 that should include the delivery charges and delivery L&D note etc.  But the notes seemed scrambled between the two encounters.   I dont know who needs to address it but problably with me so we can cut and past etc.

## 2023-07-18 ENCOUNTER — OFFICE VISIT (OUTPATIENT)
Dept: CARDIOLOGY | Age: 28
End: 2023-07-18
Payer: COMMERCIAL

## 2023-07-18 VITALS
SYSTOLIC BLOOD PRESSURE: 98 MMHG | RESPIRATION RATE: 18 BRPM | HEART RATE: 75 BPM | OXYGEN SATURATION: 98 % | BODY MASS INDEX: 29.08 KG/M2 | HEIGHT: 62 IN | DIASTOLIC BLOOD PRESSURE: 65 MMHG | WEIGHT: 158 LBS

## 2023-07-18 DIAGNOSIS — E78.5 DYSLIPIDEMIA: Primary | ICD-10-CM

## 2023-07-18 DIAGNOSIS — Z83.438 FAMILY HISTORY OF HYPERLIPIDEMIA: ICD-10-CM

## 2023-07-18 DIAGNOSIS — Z87.891 FORMER SMOKER: ICD-10-CM

## 2023-07-18 PROCEDURE — 99203 OFFICE O/P NEW LOW 30 MIN: CPT | Performed by: PHYSICIAN ASSISTANT

## 2023-07-18 PROCEDURE — 93000 ELECTROCARDIOGRAM COMPLETE: CPT | Performed by: FAMILY MEDICINE

## 2023-07-18 NOTE — PROGRESS NOTES
IJackie am scribing for and in the presence of Dottie Garcia PA-C. Patient: Alvaro Reyes  : 1995  Date of Visit: 2023    REASON FOR VISIT / CONSULTATION: Establish Cardiologist (Babatunde Amaya PT is here to est care due to family history of cholesterol she had baby 2 months ago. She is doing well denies:CP,SOB, lightheaded/dizziness,palp )      History of Present Illness:        Dear Danis Michel, FAUSTINA - CNP    I had the pleasure of seeing Alvaro Reyes in my office today. Ms. Chris Culp is a 32 y.o. female with a history of hyperlipidemia. She is a former smoker, she quit 3 years ago, smoked 1/2 pack a week and smoked for about 8 years. She does have hyperlipidemia in her father and grandfather. She was borderline diabetes with first pregnancy. She delivered second son two months ago and had an uncomplicated delivery. EKG in office today 2023 shows normal sinus rhythm. Ms. Chris Culp is here to establish care today for elevated lab work. Her LDL has been borderline elevated for several years. She states she was told it could be due to her pregnancy. She is currently breast feeding. She is currently asymptomatic and denies any chest pain, pressure, or palpitations. She denies any lightheaded or dizziness. 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 She states she is doing well. Denies stomach pain, nausea, or vomiting. Denies any blood in urine or stools. Ms. Chris Culp denies any chest pain now or in the recent past, increased shortness of breath, abdominal pain, bleeding problems, problems with her medications or any other concerns at this time. PAST MEDICAL HISTORY:        Past Medical History:   Diagnosis Date    Abnormal Pap smear of cervix 2018    Acid reflux     Asthma        CURRENT ALLERGIES: Patient has no known allergies. REVIEW OF SYSTEMS: 14 systems were reviewed.  Pertinent

## 2023-07-18 NOTE — PATIENT INSTRUCTIONS
SURVEY:    You may be receiving a survey from [employer] regarding your visit today. Please complete the survey to enable us to provide the highest quality of care to you and your family. If you cannot score us a very good on any question, please call the office to discuss how we could have made your experience a very good one. Thank you.

## 2023-07-31 RX ORDER — MONTELUKAST SODIUM 10 MG/1
10 TABLET ORAL NIGHTLY
Qty: 90 TABLET | Refills: 3 | Status: SHIPPED | OUTPATIENT
Start: 2023-07-31

## 2023-08-14 RX ORDER — OMEPRAZOLE 20 MG/1
CAPSULE, DELAYED RELEASE ORAL
Qty: 90 CAPSULE | Refills: 0 | Status: SHIPPED | OUTPATIENT
Start: 2023-08-14

## 2023-11-09 RX ORDER — OMEPRAZOLE 20 MG/1
CAPSULE, DELAYED RELEASE ORAL
Qty: 90 CAPSULE | Refills: 0 | Status: SHIPPED | OUTPATIENT
Start: 2023-11-09

## 2023-11-13 RX ORDER — OMEPRAZOLE 20 MG/1
CAPSULE, DELAYED RELEASE ORAL
Qty: 90 CAPSULE | Refills: 0 | Status: SHIPPED | OUTPATIENT
Start: 2023-11-13

## 2023-11-28 ENCOUNTER — HOSPITAL ENCOUNTER (EMERGENCY)
Age: 28
Discharge: HOME | End: 2023-11-28
Payer: COMMERCIAL

## 2023-11-28 ENCOUNTER — APPOINTMENT (OUTPATIENT)
Dept: GENERAL RADIOLOGY | Age: 28
End: 2023-11-28
Payer: COMMERCIAL

## 2023-11-28 VITALS
HEART RATE: 75 BPM | RESPIRATION RATE: 18 BRPM | DIASTOLIC BLOOD PRESSURE: 76 MMHG | SYSTOLIC BLOOD PRESSURE: 118 MMHG | TEMPERATURE: 98.1 F | WEIGHT: 150 LBS | BODY MASS INDEX: 27.6 KG/M2 | HEIGHT: 62 IN | OXYGEN SATURATION: 98 %

## 2023-11-28 DIAGNOSIS — S60.222A CONTUSION OF LEFT HAND, INITIAL ENCOUNTER: Primary | ICD-10-CM

## 2023-11-28 DIAGNOSIS — S61.432A PUNCTURE WOUND OF LEFT HAND WITHOUT FOREIGN BODY, INITIAL ENCOUNTER: ICD-10-CM

## 2023-11-28 PROCEDURE — 99283 EMERGENCY DEPT VISIT LOW MDM: CPT

## 2023-11-28 PROCEDURE — 73120 X-RAY EXAM OF HAND: CPT

## 2023-11-28 ASSESSMENT — PAIN - FUNCTIONAL ASSESSMENT: PAIN_FUNCTIONAL_ASSESSMENT: 0-10

## 2023-11-28 ASSESSMENT — PAIN SCALES - GENERAL: PAINLEVEL_OUTOF10: 5

## 2023-11-28 ASSESSMENT — PAIN DESCRIPTION - LOCATION: LOCATION: HAND

## 2023-11-28 ASSESSMENT — PAIN DESCRIPTION - ORIENTATION: ORIENTATION: LEFT

## 2023-11-28 NOTE — DISCHARGE INSTRUCTIONS
Wash area 3 times daily with soap and water, Dry well. Apply mupirocin and bandaid. Continue until healed. OTC tylenol/ibuprofen as needed for comfort.

## 2023-11-28 NOTE — ED PROVIDER NOTES
Magnolia WEEMS/CNP or Occupational Health in 1 week for recheck/    Worker Compensation paper work completed. Amount and/or Complexity of Data Reviewed  Radiology: ordered. Decision-making details documented in ED Course. REASSESSMENT          CRITICAL CARE TIME   Total Critical Care time was  minutes, excluding separately reportable procedures. There was a high probability of clinically significant/life threatening deterioration in the patient's condition which required my urgent intervention. CONSULTS:  None    PROCEDURES:  Unless otherwise noted below, none     Procedures        FINAL IMPRESSION      1. Contusion of left hand, initial encounter    2. Puncture wound of left hand without foreign body, initial encounter          DISPOSITION/PLAN   DISPOSITION Decision To Discharge 11/28/2023 03:16:18 PM      PATIENT REFERRED TO:  [address]  [place] [address]  [phone]    In 1 week  For wound re-check    [facility]  [phone]  Schedule an appointment as soon as possible for a visit in 1 week  For wound re-check      DISCHARGE MEDICATIONS:  New Prescriptions    MUPIROCIN (BACTROBAN) 2 % OINTMENT    Apply topically 3 times daily. Controlled Substances Monitoring:          No data to display                (Please note that portions of this note were completed with a voice recognition program.  Efforts were made to edit the dictations but occasionally words are mis-transcribed.)    FAUSTINA Scanlon CNP (electronically signed)  Attending Emergency Physician           Gerhardt Slough, APRN - CNP  11/28/23 3535

## 2023-11-29 ENCOUNTER — TELEPHONE (OUTPATIENT)
Dept: PRIMARY CARE CLINIC | Age: 28
End: 2023-11-29

## 2023-11-29 NOTE — TELEPHONE ENCOUNTER
[facility] [place] [facility] ED Follow up Call    Reason for ED visit:  left hand issue     11/29/2023         VOICEMAIL DOCUMENTATION - ERASE IF NOT USED  Hi, this message is for Aurelia. This is Lanny Cueva from [address], [place] office. Just calling to see how you are doing after your recent visit to the Emergency Room. Jacy Parra wants to make sure you were able to fill any prescriptions and that you understand your discharge instructions. Please return our call if you need to make a follow up appointment with your provider or have any further needs. Our phone number is [phone]. Have a great day.

## 2024-01-04 DIAGNOSIS — N92.6 IRREGULAR MENSES: ICD-10-CM

## 2024-01-10 RX ORDER — ACETAMINOPHEN AND CODEINE PHOSPHATE 120; 12 MG/5ML; MG/5ML
1 SOLUTION ORAL DAILY
Qty: 28 TABLET | Refills: 3 | Status: SHIPPED | OUTPATIENT
Start: 2024-01-10

## 2024-01-10 NOTE — TELEPHONE ENCOUNTER
Yearly got moved out. Was suppose to have a yearly in December but patient scheduled for 3/19/24. Needs refills until then.

## 2024-02-12 RX ORDER — NORGESTIMATE AND ETHINYL ESTRADIOL 0.25-0.035
1 KIT ORAL DAILY
Qty: 1 PACKET | Refills: 3 | Status: SHIPPED | OUTPATIENT
Start: 2024-02-12

## 2024-02-29 RX ORDER — MONTELUKAST SODIUM 10 MG/1
10 TABLET ORAL NIGHTLY
Qty: 90 TABLET | Refills: 1 | Status: SHIPPED | OUTPATIENT
Start: 2024-02-29

## 2024-03-19 ENCOUNTER — OFFICE VISIT (OUTPATIENT)
Dept: OBGYN | Age: 29
End: 2024-03-19
Payer: COMMERCIAL

## 2024-03-19 VITALS
WEIGHT: 159 LBS | BODY MASS INDEX: 29.26 KG/M2 | SYSTOLIC BLOOD PRESSURE: 120 MMHG | DIASTOLIC BLOOD PRESSURE: 78 MMHG | HEIGHT: 62 IN

## 2024-03-19 DIAGNOSIS — Z01.419 ENCOUNTER FOR ANNUAL ROUTINE GYNECOLOGICAL EXAMINATION: Primary | ICD-10-CM

## 2024-03-19 PROCEDURE — 99395 PREV VISIT EST AGE 18-39: CPT | Performed by: ADVANCED PRACTICE MIDWIFE

## 2024-03-19 NOTE — PROGRESS NOTES
YEARLY PHYSICAL    Date of service: 3/19/2024    Aurelia Sanders  Is a 28 y.o.  single, female    PT's PCP is: Madelaine Fonseca APRN - CNP     : 1995                                             Subjective:       Patient's last menstrual period was 2024 (exact date).     Are your menses regular: no    OB History    Para Term  AB Living   2 2 2     2   SAB IAB Ectopic Molar Multiple Live Births           0 2      # Outcome Date GA Lbr Phillip/2nd Weight Sex Delivery Anes PTL Lv   2 Term 23 40w0d / 00:34 3.526 kg (7 lb 12.4 oz) M Vag-Spont None N ZENON   1 Term 10/18/20 39w0d / 00:25 2.778 kg (6 lb 2 oz) M Vag-Spont Local N ZENON      Complications: Precipitous Labor        Social History     Tobacco Use   Smoking Status Former    Current packs/day: 0.00    Types: Cigarettes    Quit date: 3/3/2020    Years since quittin.0   Smokeless Tobacco Never        Social History     Substance and Sexual Activity   Alcohol Use Not Currently       Family History   Problem Relation Age of Onset    GERD Mother     Diabetes Father     Hypertension Father     Elevated Lipids Father     Hemochromatosis Brother     Dementia Maternal Grandmother     No Known Problems Maternal Grandfather     Osteoporosis Paternal Grandmother     Hypertension Paternal Grandfather     Heart Disease Paternal Grandfather     Stroke Paternal Grandfather     Breast Cancer Neg Hx     Ovarian Cancer Neg Hx        Any family history of breast or ovarian cancer: No    Any family history of blood clots: No    Allergies: Patient has no known allergies.      Current Outpatient Medications:     montelukast (SINGULAIR) 10 MG tablet, Take 1 tablet by mouth nightly, Disp: 90 tablet, Rfl: 1    norgestimate-ethinyl estradiol (SPRINTEC 28) 0.25-35 MG-MCG per tablet, Take 1 tablet by mouth daily, Disp: 1 packet, Rfl: 3    omeprazole (PRILOSEC) 20 MG delayed release capsule,

## 2024-03-21 ASSESSMENT — ENCOUNTER SYMPTOMS
BACK PAIN: 0
ABDOMINAL PAIN: 0
SHORTNESS OF BREATH: 0

## 2024-05-06 RX ORDER — OMEPRAZOLE 20 MG/1
CAPSULE, DELAYED RELEASE ORAL
Qty: 90 CAPSULE | Refills: 0 | Status: SHIPPED | OUTPATIENT
Start: 2024-05-06

## 2024-05-25 ENCOUNTER — HOSPITAL ENCOUNTER (OUTPATIENT)
Age: 29
Discharge: HOME | End: 2024-05-25
Payer: COMMERCIAL

## 2024-05-25 DIAGNOSIS — Z00.00 WELLNESS EXAMINATION: ICD-10-CM

## 2024-05-25 DIAGNOSIS — E78.2 MODERATE MIXED HYPERLIPIDEMIA NOT REQUIRING STATIN THERAPY: ICD-10-CM

## 2024-05-25 LAB
ALBUMIN SERPL-MCNC: 4.2 G/DL (ref 3.5–5.2)
ALBUMIN/GLOB SERPL: 1.4 {RATIO} (ref 1–2.5)
ALP SERPL-CCNC: 52 U/L (ref 35–104)
ALT SERPL-CCNC: 16 U/L (ref 5–33)
ANION GAP SERPL CALCULATED.3IONS-SCNC: 12 MMOL/L (ref 9–17)
AST SERPL-CCNC: 15 U/L
BILIRUB SERPL-MCNC: 0.3 MG/DL (ref 0.3–1.2)
BUN SERPL-MCNC: 12 MG/DL (ref 6–20)
BUN/CREAT SERPL: 17 (ref 9–20)
CALCIUM SERPL-MCNC: 9 MG/DL (ref 8.6–10.4)
CHLORIDE SERPL-SCNC: 104 MMOL/L (ref 98–107)
CHOLEST SERPL-MCNC: 214 MG/DL (ref 0–199)
CHOLESTEROL/HDL RATIO: 4
CO2 SERPL-SCNC: 23 MMOL/L (ref 20–31)
CREAT SERPL-MCNC: 0.7 MG/DL (ref 0.5–0.9)
EST. AVERAGE GLUCOSE BLD GHB EST-MCNC: 105 MG/DL
GFR, ESTIMATED: >90 ML/MIN/1.73M2
GLUCOSE SERPL-MCNC: 102 MG/DL (ref 70–99)
HBA1C MFR BLD: 5.3 % (ref 4–6)
HDLC SERPL-MCNC: 58 MG/DL
LDLC SERPL CALC-MCNC: 130 MG/DL (ref 0–100)
POTASSIUM SERPL-SCNC: 4.4 MMOL/L (ref 3.7–5.3)
PROT SERPL-MCNC: 7.3 G/DL (ref 6.4–8.3)
SODIUM SERPL-SCNC: 139 MMOL/L (ref 135–144)
TRIGL SERPL-MCNC: 130 MG/DL
VLDLC SERPL CALC-MCNC: 26 MG/DL

## 2024-05-25 PROCEDURE — 83036 HEMOGLOBIN GLYCOSYLATED A1C: CPT

## 2024-05-25 PROCEDURE — 80053 COMPREHEN METABOLIC PANEL: CPT

## 2024-05-25 PROCEDURE — 80061 LIPID PANEL: CPT

## 2024-05-25 PROCEDURE — 36415 COLL VENOUS BLD VENIPUNCTURE: CPT

## 2024-05-28 ENCOUNTER — OFFICE VISIT (OUTPATIENT)
Dept: PRIMARY CARE CLINIC | Age: 29
End: 2024-05-28
Payer: COMMERCIAL

## 2024-05-28 ENCOUNTER — HOSPITAL ENCOUNTER (OUTPATIENT)
Dept: GENERAL RADIOLOGY | Age: 29
Discharge: HOME | End: 2024-05-30
Payer: COMMERCIAL

## 2024-05-28 ENCOUNTER — HOSPITAL ENCOUNTER (OUTPATIENT)
Age: 29
Discharge: HOME | End: 2024-05-30
Payer: COMMERCIAL

## 2024-05-28 VITALS
WEIGHT: 154 LBS | BODY MASS INDEX: 28.17 KG/M2 | HEART RATE: 84 BPM | OXYGEN SATURATION: 100 % | TEMPERATURE: 97.5 F | DIASTOLIC BLOOD PRESSURE: 72 MMHG | SYSTOLIC BLOOD PRESSURE: 110 MMHG

## 2024-05-28 DIAGNOSIS — S99.921S FOOT INJURY, RIGHT, SEQUELA: ICD-10-CM

## 2024-05-28 DIAGNOSIS — M79.674 TOE PAIN, RIGHT: ICD-10-CM

## 2024-05-28 DIAGNOSIS — Z00.00 WELLNESS EXAMINATION: Primary | ICD-10-CM

## 2024-05-28 DIAGNOSIS — Z23 NEED FOR VACCINATION: ICD-10-CM

## 2024-05-28 PROBLEM — R87.619 ABNORMAL PAP SMEAR OF CERVIX: Status: ACTIVE | Noted: 2024-05-28

## 2024-05-28 PROBLEM — K21.9 ACID REFLUX: Status: ACTIVE | Noted: 2024-05-28

## 2024-05-28 PROCEDURE — 90677 PCV20 VACCINE IM: CPT | Performed by: NURSE PRACTITIONER

## 2024-05-28 PROCEDURE — 90471 IMMUNIZATION ADMIN: CPT | Performed by: NURSE PRACTITIONER

## 2024-05-28 PROCEDURE — 99213 OFFICE O/P EST LOW 20 MIN: CPT | Performed by: NURSE PRACTITIONER

## 2024-05-28 PROCEDURE — 99395 PREV VISIT EST AGE 18-39: CPT | Performed by: NURSE PRACTITIONER

## 2024-05-28 PROCEDURE — 73630 X-RAY EXAM OF FOOT: CPT

## 2024-05-28 SDOH — ECONOMIC STABILITY: INCOME INSECURITY: HOW HARD IS IT FOR YOU TO PAY FOR THE VERY BASICS LIKE FOOD, HOUSING, MEDICAL CARE, AND HEATING?: NOT HARD AT ALL

## 2024-05-28 SDOH — ECONOMIC STABILITY: FOOD INSECURITY: WITHIN THE PAST 12 MONTHS, YOU WORRIED THAT YOUR FOOD WOULD RUN OUT BEFORE YOU GOT MONEY TO BUY MORE.: NEVER TRUE

## 2024-05-28 SDOH — ECONOMIC STABILITY: FOOD INSECURITY: WITHIN THE PAST 12 MONTHS, THE FOOD YOU BOUGHT JUST DIDN'T LAST AND YOU DIDN'T HAVE MONEY TO GET MORE.: NEVER TRUE

## 2024-05-28 ASSESSMENT — PATIENT HEALTH QUESTIONNAIRE - PHQ9
SUM OF ALL RESPONSES TO PHQ QUESTIONS 1-9: 0
2. FEELING DOWN, DEPRESSED OR HOPELESS: NOT AT ALL
SUM OF ALL RESPONSES TO PHQ9 QUESTIONS 1 & 2: 0
1. LITTLE INTEREST OR PLEASURE IN DOING THINGS: NOT AT ALL
SUM OF ALL RESPONSES TO PHQ QUESTIONS 1-9: 0

## 2024-05-28 ASSESSMENT — ENCOUNTER SYMPTOMS
SHORTNESS OF BREATH: 0
NAUSEA: 0
DIARRHEA: 0

## 2024-05-28 NOTE — PROGRESS NOTES
worsening/persistent pain or abnormalities     -- Reviewed emergent signs and symptoms and when to seek care at the emergency department and/or call 911     Completed Refills   Requested Prescriptions      No prescriptions requested or ordered in this encounter       Orders Placed This Encounter   Procedures    XR FOOT RIGHT (MIN 3 VIEWS)     Standing Status:   Future     Standing Expiration Date:   5/28/2025    Lipid Panel     Standing Status:   Future     Standing Expiration Date:   5/28/2026    CBC     Standing Status:   Future     Standing Expiration Date:   5/28/2026    Basic Metabolic Panel     Standing Status:   Future     Standing Expiration Date:   5/28/2026    AST     Standing Status:   Future     Standing Expiration Date:   5/28/2026    ALT     Standing Status:   Future     Standing Expiration Date:   5/28/2026        No results found for this visit on 05/28/24.    Return in about 1 year (around 5/28/2025), or if symptoms worsen or fail to improve, for wellness .    Electronically signed by FAUSTINA Colón CNP on 05/28/24 at 3:56 PM.

## 2024-06-04 NOTE — TELEPHONE ENCOUNTER
Pt states she is currently still taking this medication and it is working well she is not having any spotting at all. Pt was last seen 3/2024 and has yearly scheduled for 3/26/25. I noticed the medication did not have refills was this patient supposed to have a three month med check or is a yearly she is already scheduled for ok? Pended medication no refills

## 2024-06-17 ENCOUNTER — OFFICE VISIT (OUTPATIENT)
Dept: PRIMARY CARE CLINIC | Age: 29
End: 2024-06-17
Payer: COMMERCIAL

## 2024-06-17 ENCOUNTER — HOSPITAL ENCOUNTER (OUTPATIENT)
Dept: GENERAL RADIOLOGY | Age: 29
Discharge: HOME | End: 2024-06-19
Payer: COMMERCIAL

## 2024-06-17 ENCOUNTER — HOSPITAL ENCOUNTER (OUTPATIENT)
Age: 29
Discharge: HOME | End: 2024-06-19
Payer: COMMERCIAL

## 2024-06-17 VITALS
BODY MASS INDEX: 27.98 KG/M2 | WEIGHT: 153 LBS | DIASTOLIC BLOOD PRESSURE: 78 MMHG | SYSTOLIC BLOOD PRESSURE: 110 MMHG | TEMPERATURE: 97.3 F | HEART RATE: 91 BPM | OXYGEN SATURATION: 98 %

## 2024-06-17 DIAGNOSIS — J45.909 PERSISTENT ASTHMA WITHOUT COMPLICATION, UNSPECIFIED ASTHMA SEVERITY: ICD-10-CM

## 2024-06-17 DIAGNOSIS — R05.1 ACUTE COUGH: ICD-10-CM

## 2024-06-17 DIAGNOSIS — R05.1 ACUTE COUGH: Primary | ICD-10-CM

## 2024-06-17 PROCEDURE — 71046 X-RAY EXAM CHEST 2 VIEWS: CPT

## 2024-06-17 PROCEDURE — 99214 OFFICE O/P EST MOD 30 MIN: CPT | Performed by: NURSE PRACTITIONER

## 2024-06-17 RX ORDER — FLUTICASONE PROPIONATE AND SALMETEROL 250; 50 UG/1; UG/1
1 POWDER RESPIRATORY (INHALATION) EVERY 12 HOURS
Qty: 1 EACH | Refills: 3 | Status: SHIPPED | OUTPATIENT
Start: 2024-06-17

## 2024-06-17 RX ORDER — ALBUTEROL SULFATE 2.5 MG/3ML
2.5 SOLUTION RESPIRATORY (INHALATION) EVERY 4 HOURS PRN
Qty: 120 EACH | Refills: 3 | Status: SHIPPED | OUTPATIENT
Start: 2024-06-17

## 2024-06-17 RX ORDER — PREDNISONE 20 MG/1
20 TABLET ORAL 2 TIMES DAILY
Qty: 10 TABLET | Refills: 0 | Status: SHIPPED | OUTPATIENT
Start: 2024-06-17 | End: 2024-06-21 | Stop reason: SDUPTHER

## 2024-06-17 RX ORDER — ALBUTEROL SULFATE 90 UG/1
2 AEROSOL, METERED RESPIRATORY (INHALATION) EVERY 4 HOURS PRN
Qty: 18 G | Refills: 3 | Status: SHIPPED | OUTPATIENT
Start: 2024-06-17

## 2024-06-17 RX ORDER — DOXYCYCLINE HYCLATE 100 MG
100 TABLET ORAL 2 TIMES DAILY
Qty: 14 TABLET | Refills: 0 | Status: SHIPPED | OUTPATIENT
Start: 2024-06-17 | End: 2024-06-24

## 2024-06-17 NOTE — PROGRESS NOTES
heart rate, SpO2 WNL.  Will continue to monitor  - Follow-up 4 days or sooner with any concerns  - Reviewed emergent signs and symptoms and when to seek care at the ED    -- Reviewed emergent signs and symptoms and when to seek care at the emergency department and/or call 911     Completed Refills   Requested Prescriptions     Signed Prescriptions Disp Refills    albuterol (PROVENTIL) (2.5 MG/3ML) 0.083% nebulizer solution 120 each 3     Sig: Take 3 mLs by nebulization every 4 hours as needed for Wheezing or Shortness of Breath    doxycycline hyclate (VIBRA-TABS) 100 MG tablet 14 tablet 0     Sig: Take 1 tablet by mouth 2 times daily for 7 days    predniSONE (DELTASONE) 20 MG tablet 10 tablet 0     Sig: Take 1 tablet by mouth 2 times daily for 5 days    albuterol sulfate HFA (VENTOLIN HFA) 108 (90 Base) MCG/ACT inhaler 18 g 3     Sig: Inhale 2 puffs into the lungs every 4 hours as needed for Wheezing or Shortness of Breath    fluticasone-salmeterol (ADVAIR DISKUS) 250-50 MCG/ACT AEPB diskus inhaler 1 each 3     Sig: Inhale 1 puff into the lungs in the morning and 1 puff in the evening.       Orders Placed This Encounter   Procedures    XR CHEST STANDARD (2 VW)     Standing Status:   Future     Number of Occurrences:   1     Standing Expiration Date:   6/17/2025    DME Order for Nebulizer as OP     You must complete the order parameters below and add the medical necessity documentation for this DME in a separate note.    Nebulizer with compressor  Disposable Med Nebs 2 per month  Reusable Med Nebs 1 per 6 months  Aerosol Mask 1 per month  Replacement Filters 2 per month  All other related supplies as needed per month    Frequency: every 4 hours as needed SOB, wheezing     Diagnosis: asthma     Length of Need: 12 months     Order Specific Question:   Medications being used:     Answer:   Albuterol .083 unit dose vial        No results found for this visit on 06/17/24.    Return in about 4 days (around 6/21/2024), or

## 2024-06-21 ENCOUNTER — APPOINTMENT (OUTPATIENT)
Dept: CT IMAGING | Age: 29
End: 2024-06-21
Payer: COMMERCIAL

## 2024-06-21 ENCOUNTER — HOSPITAL ENCOUNTER (OUTPATIENT)
Age: 29
Discharge: HOME | End: 2024-06-21
Payer: COMMERCIAL

## 2024-06-21 ENCOUNTER — HOSPITAL ENCOUNTER (EMERGENCY)
Age: 29
Discharge: HOME | End: 2024-06-21
Payer: COMMERCIAL

## 2024-06-21 ENCOUNTER — OFFICE VISIT (OUTPATIENT)
Dept: PRIMARY CARE CLINIC | Age: 29
End: 2024-06-21
Payer: COMMERCIAL

## 2024-06-21 VITALS
HEIGHT: 62 IN | OXYGEN SATURATION: 99 % | HEART RATE: 92 BPM | TEMPERATURE: 97.3 F | WEIGHT: 154 LBS | SYSTOLIC BLOOD PRESSURE: 110 MMHG | BODY MASS INDEX: 28.34 KG/M2 | RESPIRATION RATE: 16 BRPM | DIASTOLIC BLOOD PRESSURE: 70 MMHG

## 2024-06-21 VITALS
TEMPERATURE: 98.5 F | OXYGEN SATURATION: 99 % | RESPIRATION RATE: 16 BRPM | SYSTOLIC BLOOD PRESSURE: 109 MMHG | DIASTOLIC BLOOD PRESSURE: 69 MMHG | HEART RATE: 85 BPM

## 2024-06-21 DIAGNOSIS — R05.1 ACUTE COUGH: ICD-10-CM

## 2024-06-21 DIAGNOSIS — R07.89 CHEST TIGHTNESS: ICD-10-CM

## 2024-06-21 DIAGNOSIS — R06.02 SHORTNESS OF BREATH: Primary | ICD-10-CM

## 2024-06-21 DIAGNOSIS — R79.89 ELEVATED D-DIMER: ICD-10-CM

## 2024-06-21 DIAGNOSIS — E87.6 HYPOKALEMIA: ICD-10-CM

## 2024-06-21 DIAGNOSIS — R05.1 ACUTE COUGH: Primary | ICD-10-CM

## 2024-06-21 LAB
ALBUMIN SERPL-MCNC: 4.3 G/DL (ref 3.5–5.2)
ALBUMIN/GLOB SERPL: 1.5 {RATIO} (ref 1–2.5)
ALP SERPL-CCNC: 55 U/L (ref 35–104)
ALT SERPL-CCNC: 16 U/L (ref 5–33)
ANION GAP SERPL CALCULATED.3IONS-SCNC: 13 MMOL/L (ref 9–17)
AST SERPL-CCNC: 13 U/L
BASOPHILS # BLD: <0.03 K/UL (ref 0–0.2)
BASOPHILS NFR BLD: 0 % (ref 0–2)
BILIRUB SERPL-MCNC: 0.2 MG/DL (ref 0.3–1.2)
BUN SERPL-MCNC: 15 MG/DL (ref 6–20)
BUN/CREAT SERPL: 21 (ref 9–20)
CALCIUM SERPL-MCNC: 8.7 MG/DL (ref 8.6–10.4)
CHLORIDE SERPL-SCNC: 102 MMOL/L (ref 98–107)
CO2 SERPL-SCNC: 25 MMOL/L (ref 20–31)
CREAT SERPL-MCNC: 0.7 MG/DL (ref 0.5–0.9)
D DIMER PPP FEU-MCNC: 0.82 UG/ML FEU (ref 0–0.59)
EOSINOPHIL # BLD: 0.05 K/UL (ref 0–0.44)
EOSINOPHILS RELATIVE PERCENT: 1 % (ref 1–4)
ERYTHROCYTE [DISTWIDTH] IN BLOOD BY AUTOMATED COUNT: 12.6 % (ref 11.8–14.4)
GFR, ESTIMATED: >90 ML/MIN/1.73M2
GLUCOSE SERPL-MCNC: 97 MG/DL (ref 70–99)
HCG SERPL QL: NEGATIVE
HCT VFR BLD AUTO: 38.5 % (ref 36.3–47.1)
HGB BLD-MCNC: 13.1 G/DL (ref 11.9–15.1)
IMM GRANULOCYTES # BLD AUTO: <0.03 K/UL (ref 0–0.3)
IMM GRANULOCYTES NFR BLD: 0 %
INR PPP: 1
LYMPHOCYTES NFR BLD: 3.96 K/UL (ref 1.1–3.7)
LYMPHOCYTES RELATIVE PERCENT: 38 % (ref 24–43)
MCH RBC QN AUTO: 32.3 PG (ref 25.2–33.5)
MCHC RBC AUTO-ENTMCNC: 34 G/DL (ref 28.4–34.8)
MCV RBC AUTO: 95.1 FL (ref 82.6–102.9)
MONOCYTES NFR BLD: 0.76 K/UL (ref 0.1–1.2)
MONOCYTES NFR BLD: 7 % (ref 3–12)
NEUTROPHILS NFR BLD: 54 % (ref 36–65)
NEUTS SEG NFR BLD: 5.73 K/UL (ref 1.5–8.1)
NRBC BLD-RTO: 0 PER 100 WBC
PARTIAL THROMBOPLASTIN TIME: 26.6 SEC (ref 26.8–34.8)
PLATELET # BLD AUTO: 340 K/UL (ref 138–453)
PMV BLD AUTO: 8.2 FL (ref 8.1–13.5)
POTASSIUM SERPL-SCNC: 3.4 MMOL/L (ref 3.7–5.3)
PROT SERPL-MCNC: 7.2 G/DL (ref 6.4–8.3)
PROTHROMBIN TIME: 12.5 SEC (ref 11.7–14.1)
RBC # BLD AUTO: 4.05 M/UL (ref 3.95–5.11)
SODIUM SERPL-SCNC: 140 MMOL/L (ref 135–144)
WBC OTHER # BLD: 10.5 K/UL (ref 3.5–11.3)

## 2024-06-21 PROCEDURE — 85610 PROTHROMBIN TIME: CPT

## 2024-06-21 PROCEDURE — 6360000004 HC RX CONTRAST MEDICATION: Performed by: NURSE PRACTITIONER

## 2024-06-21 PROCEDURE — 85025 COMPLETE CBC W/AUTO DIFF WBC: CPT

## 2024-06-21 PROCEDURE — 99213 OFFICE O/P EST LOW 20 MIN: CPT | Performed by: NURSE PRACTITIONER

## 2024-06-21 PROCEDURE — 36415 COLL VENOUS BLD VENIPUNCTURE: CPT

## 2024-06-21 PROCEDURE — 71260 CT THORAX DX C+: CPT

## 2024-06-21 PROCEDURE — 85730 THROMBOPLASTIN TIME PARTIAL: CPT

## 2024-06-21 PROCEDURE — 80053 COMPREHEN METABOLIC PANEL: CPT

## 2024-06-21 PROCEDURE — 84703 CHORIONIC GONADOTROPIN ASSAY: CPT

## 2024-06-21 PROCEDURE — 99285 EMERGENCY DEPT VISIT HI MDM: CPT

## 2024-06-21 PROCEDURE — 85379 FIBRIN DEGRADATION QUANT: CPT

## 2024-06-21 PROCEDURE — 6370000000 HC RX 637 (ALT 250 FOR IP): Performed by: NURSE PRACTITIONER

## 2024-06-21 RX ORDER — CETIRIZINE HYDROCHLORIDE 10 MG/1
10 TABLET ORAL DAILY
Qty: 30 TABLET | Refills: 0 | Status: SHIPPED | OUTPATIENT
Start: 2024-06-21

## 2024-06-21 RX ORDER — POTASSIUM CHLORIDE 20 MEQ/1
40 TABLET, EXTENDED RELEASE ORAL ONCE
Status: COMPLETED | OUTPATIENT
Start: 2024-06-21 | End: 2024-06-21

## 2024-06-21 RX ORDER — PREDNISONE 20 MG/1
20 TABLET ORAL 3 TIMES DAILY
Qty: 9 TABLET | Refills: 0 | Status: SHIPPED | OUTPATIENT
Start: 2024-06-21 | End: 2024-06-24 | Stop reason: SDUPTHER

## 2024-06-21 RX ADMIN — POTASSIUM CHLORIDE 40 MEQ: 1500 TABLET, EXTENDED RELEASE ORAL at 19:09

## 2024-06-21 RX ADMIN — IOPAMIDOL 75 ML: 755 INJECTION, SOLUTION INTRAVENOUS at 18:34

## 2024-06-21 ASSESSMENT — PAIN - FUNCTIONAL ASSESSMENT: PAIN_FUNCTIONAL_ASSESSMENT: NONE - DENIES PAIN

## 2024-06-21 ASSESSMENT — ENCOUNTER SYMPTOMS: COUGH: 1

## 2024-06-21 NOTE — PROGRESS NOTES
Name: Aurelia Sanders  : 1995         Chief Complaint:     Chief Complaint   Patient presents with    Follow-up     Patient is here today for asthma exacerbation follow up.  Was seen on 24.  -patient reports feeling better, not coughing as much at night.        History of Present Illness:      Aurelia Sanders is a 28 y.o.  female who presents with Follow-up (Patient is here today for asthma exacerbation follow up./Was seen on 24./-patient reports feeling better, not coughing as much at night. )      HPI    The patient presents for f/u of asthma exacerbation. She is using advair BID. She is also using albuterol nebulizer. She has been avoiding triggers at work such as dust. She is still taking PO prednisone and doxycyline since most recent visit. She is also continuing singulair. Today, she states she feels 50% improved since most recent visit. Cough is still present, but improved. Admits occasional SOB. She is still noticing tightness and wheezing. She admits improvement after using nebulized albuterol. Admits mild headaches in occipital area from coughing.     Past Medical History:     Past Medical History:   Diagnosis Date    Abnormal Pap smear of cervix 2018    Acid reflux     Asthma       Reviewed all health maintenance requirements and ordered appropriate tests  Health Maintenance Due   Topic Date Due    COVID-19 Vaccine (1) Never done    Polio vaccine (4 of 4 - 4-dose series) 10/15/1997       Past Surgical History:     Past Surgical History:   Procedure Laterality Date    LEEP  2022    Dr. Audrey OLIVER N/A 2022    LEEP performed by Vanesa Torre DO at [facility] OR        Medications:       Prior to Admission medications    Medication Sig Start Date End Date Taking? Authorizing Provider   cetirizine (ZYRTEC) 10 MG tablet Take 1 tablet by mouth daily 24  Yes Madelaine Fonseca, APRN - CNP   albuterol (PROVENTIL) (2.5 MG/3ML) 0.083% nebulizer solution Take 3 mLs by

## 2024-06-21 NOTE — DISCHARGE INSTRUCTIONS
Your CT pulmonary emboli study was negative for blood clot or lung disease.   Continue home medications   Increae potassium rich foods daily.

## 2024-06-24 ENCOUNTER — OFFICE VISIT (OUTPATIENT)
Dept: PRIMARY CARE CLINIC | Age: 29
End: 2024-06-24
Payer: COMMERCIAL

## 2024-06-24 VITALS
BODY MASS INDEX: 28.34 KG/M2 | SYSTOLIC BLOOD PRESSURE: 118 MMHG | HEIGHT: 62 IN | OXYGEN SATURATION: 98 % | WEIGHT: 154 LBS | DIASTOLIC BLOOD PRESSURE: 70 MMHG | RESPIRATION RATE: 16 BRPM | HEART RATE: 84 BPM | TEMPERATURE: 98.5 F

## 2024-06-24 DIAGNOSIS — J45.41 MODERATE PERSISTENT ASTHMA WITH EXACERBATION: Primary | ICD-10-CM

## 2024-06-24 PROCEDURE — 99213 OFFICE O/P EST LOW 20 MIN: CPT | Performed by: NURSE PRACTITIONER

## 2024-06-24 RX ORDER — PREDNISONE 20 MG/1
TABLET ORAL
Qty: 7 TABLET | Refills: 0 | Status: SHIPPED | OUTPATIENT
Start: 2024-06-24 | End: 2024-06-30

## 2024-06-24 ASSESSMENT — ENCOUNTER SYMPTOMS
COUGH: 1
SHORTNESS OF BREATH: 1

## 2024-06-24 NOTE — PATIENT INSTRUCTIONS
Prednisone Taper:  6/24/24: 20mg twice daily  6/25/24: 20mg twice daily  6/26/24: 20mg once daily  6/27/24: 20mg once daily  6/28/24: 10mg once daily  6/29/24: 10mg once daily

## 2024-06-24 NOTE — PROGRESS NOTES
Name: Aurelia Sanders  : 1995         Chief Complaint:     Chief Complaint   Patient presents with    Asthma     -patient is here for a follow up on asthma exacerbation.   -patient states she is doing better.  -has SOB when exerting herself. Coughing a little bit.        History of Present Illness:      Aurelia Sanders is a 28 y.o.  female who presents with Asthma (-patient is here for a follow up on asthma exacerbation. /-patient states she is doing better./-has SOB when exerting herself. Coughing a little bit. )      HPI    HPI 24:  Patient presents for shortness of breath and cough follow-up.  At most recent visit 2024 she was encouraged to continue Advair, p.o. doxycycline, Singulair, initiate cetirizine, and increase prednisone to 60 mg daily x 3 days.  She was found to have elevated D-dimer 2024 and was encouraged to present to the ED given her shortness of breath to rule out PE.  CTA chest completed in the ED showed no evidence of pulmonary embolism or acute pulmonary abnormality. Today, she states she feels \"good\". She states her SOB and cough are improving. She is still having SOB with activity. Denies fever or chills.     HPI 24:  The patient presents for f/u of asthma exacerbation. She is using advair BID. She is also using albuterol nebulizer. She has been avoiding triggers at work such as dust. She is still taking PO prednisone and doxycyline since most recent visit. She is also continuing singulair. Today, she states she feels 50% improved since most recent visit. Cough is still present, but improved. Admits occasional SOB. She is still noticing tightness and wheezing. She admits improvement after using nebulized albuterol. Admits mild headaches in occipital area from coughing.       Past Medical History:     Past Medical History:   Diagnosis Date    Abnormal Pap smear of cervix 2018    Acid reflux     Asthma       Reviewed all health maintenance requirements and

## 2024-06-25 ENCOUNTER — TELEPHONE (OUTPATIENT)
Dept: PRIMARY CARE CLINIC | Age: 29
End: 2024-06-25

## 2024-06-25 NOTE — TELEPHONE ENCOUNTER
[facility] ED Follow up Call    Reason for ED visit:  SOB     6/25/2024     Austin Moses , this is Natividad from Madelaine Cronin's office, just calling to see how you are doing after your recent ED visit.    Did you receive discharge instructions?  Yes  Do you understand the discharge instructions? Yes  Did the ED give you any new prescriptions? No:   Were you able to fill your prescriptions? No:       Do you have one of our red, yellow and green  Zone sheets that help you to determine when you should go to the ED?  Not Applicable      Do you need or want to make a follow up appt with your PCP?  No    Do you have any further needs in the home, e.g. equipment?  Not Applicable        [facility] appts/Provider:    Future Appointments   Date Time Provider Department Center   3/26/2025  3:45 PM Jeni Sanchez APRN - CNM [facility]   5/30/2025  3:20 PM Madelaine Fonseca APRN - CNP [facility]

## 2024-07-11 ENCOUNTER — HOSPITAL ENCOUNTER (OUTPATIENT)
Dept: GENERAL RADIOLOGY | Age: 29
Discharge: HOME | End: 2024-07-13
Payer: COMMERCIAL

## 2024-07-11 ENCOUNTER — HOSPITAL ENCOUNTER (OUTPATIENT)
Dept: CT IMAGING | Age: 29
Discharge: HOME | End: 2024-07-13
Payer: COMMERCIAL

## 2024-07-11 ENCOUNTER — HOSPITAL ENCOUNTER (OUTPATIENT)
Age: 29
Discharge: HOME | End: 2024-07-13

## 2024-07-11 ENCOUNTER — OFFICE VISIT (OUTPATIENT)
Dept: PRIMARY CARE CLINIC | Age: 29
End: 2024-07-11
Payer: COMMERCIAL

## 2024-07-11 VITALS
OXYGEN SATURATION: 95 % | DIASTOLIC BLOOD PRESSURE: 62 MMHG | SYSTOLIC BLOOD PRESSURE: 108 MMHG | TEMPERATURE: 101.6 F | WEIGHT: 154 LBS | HEART RATE: 99 BPM | BODY MASS INDEX: 28.17 KG/M2

## 2024-07-11 DIAGNOSIS — R93.89 ABNORMAL CHEST X-RAY: ICD-10-CM

## 2024-07-11 DIAGNOSIS — R05.1 ACUTE COUGH: ICD-10-CM

## 2024-07-11 DIAGNOSIS — R93.89 ABNORMAL CHEST X-RAY: Primary | ICD-10-CM

## 2024-07-11 DIAGNOSIS — R07.89 CHEST TIGHTNESS: ICD-10-CM

## 2024-07-11 DIAGNOSIS — J45.41 MODERATE PERSISTENT ASTHMA WITH EXACERBATION: ICD-10-CM

## 2024-07-11 DIAGNOSIS — R05.1 ACUTE COUGH: Primary | ICD-10-CM

## 2024-07-11 DIAGNOSIS — R06.02 SHORTNESS OF BREATH: ICD-10-CM

## 2024-07-11 DIAGNOSIS — U07.1 COVID: Primary | ICD-10-CM

## 2024-07-11 DIAGNOSIS — U07.1 COVID: ICD-10-CM

## 2024-07-11 DIAGNOSIS — J10.1 INFLUENZA A: ICD-10-CM

## 2024-07-11 DIAGNOSIS — J45.909 PERSISTENT ASTHMA WITHOUT COMPLICATION, UNSPECIFIED ASTHMA SEVERITY: ICD-10-CM

## 2024-07-11 LAB
INFLUENZA A ANTIBODY: POSITIVE
INFLUENZA B ANTIBODY: ABNORMAL
Lab: ABNORMAL
PERFORMING INSTRUMENT: ABNORMAL
QC PASS/FAIL: ABNORMAL
SARS-COV-2, POC: DETECTED

## 2024-07-11 PROCEDURE — 6360000004 HC RX CONTRAST MEDICATION: Performed by: NURSE PRACTITIONER

## 2024-07-11 PROCEDURE — 87426 SARSCOV CORONAVIRUS AG IA: CPT | Performed by: NURSE PRACTITIONER

## 2024-07-11 PROCEDURE — 71260 CT THORAX DX C+: CPT

## 2024-07-11 PROCEDURE — 71046 X-RAY EXAM CHEST 2 VIEWS: CPT

## 2024-07-11 PROCEDURE — 99213 OFFICE O/P EST LOW 20 MIN: CPT | Performed by: NURSE PRACTITIONER

## 2024-07-11 PROCEDURE — 87804 INFLUENZA ASSAY W/OPTIC: CPT | Performed by: NURSE PRACTITIONER

## 2024-07-11 RX ORDER — PREDNISONE 20 MG/1
20 TABLET ORAL 2 TIMES DAILY
Qty: 10 TABLET | Refills: 0 | Status: SHIPPED | OUTPATIENT
Start: 2024-07-11 | End: 2024-07-16

## 2024-07-11 RX ORDER — LEVOFLOXACIN 750 MG/1
750 TABLET, FILM COATED ORAL DAILY
Qty: 7 TABLET | Refills: 0 | Status: SHIPPED | OUTPATIENT
Start: 2024-07-11 | End: 2024-07-18

## 2024-07-11 RX ORDER — OSELTAMIVIR PHOSPHATE 75 MG/1
75 CAPSULE ORAL 2 TIMES DAILY
Qty: 10 CAPSULE | Refills: 0 | Status: SHIPPED | OUTPATIENT
Start: 2024-07-11 | End: 2024-07-16

## 2024-07-11 RX ADMIN — IOPAMIDOL 75 ML: 755 INJECTION, SOLUTION INTRAVENOUS at 17:17

## 2024-07-11 ASSESSMENT — ENCOUNTER SYMPTOMS
NAUSEA: 1
ABDOMINAL PAIN: 0
TROUBLE SWALLOWING: 0
SHORTNESS OF BREATH: 1
RHINORRHEA: 1
CHEST TIGHTNESS: 0
COUGH: 1
DIARRHEA: 0
WHEEZING: 1
SORE THROAT: 0
VOMITING: 0

## 2024-07-11 NOTE — PROGRESS NOTES
Covering MD received phone call re: Results  Plan to start Levaquin with close follow-up as indicated.  Patient was called and updated on the plan, including CT findings.  Electronically signed by Denilson Brandt MD on 7/11/2024 at 7:37 PM    
Normal heart sounds. No murmur heard.     No friction rub. No gallop.   Pulmonary:      Effort: Pulmonary effort is normal. No respiratory distress.      Breath sounds: No stridor. Rales (right mid posterior with inspiration) present. No wheezing or rhonchi.   Abdominal:      General: Bowel sounds are normal. There is no distension.      Palpations: Abdomen is soft. There is no mass.      Tenderness: There is no abdominal tenderness. There is no guarding.      Hernia: No hernia is present.   Musculoskeletal:         General: No tenderness. Normal range of motion.      Cervical back: Normal range of motion and neck supple.   Lymphadenopathy:      Cervical: No cervical adenopathy.   Skin:     General: Skin is warm and dry.      Capillary Refill: Capillary refill takes less than 2 seconds.      Findings: No erythema or rash.   Neurological:      Mental Status: She is alert and oriented to person, place, and time.      Cranial Nerves: No cranial nerve deficit.      Motor: No abnormal muscle tone.      Coordination: Coordination normal.   Psychiatric:         Behavior: Behavior normal.         Thought Content: Thought content normal.         Judgment: Judgment normal.         Data:     Lab Results   Component Value Date/Time     06/21/2024 06:06 PM    K 3.4 06/21/2024 06:06 PM     06/21/2024 06:06 PM    CO2 25 06/21/2024 06:06 PM    BUN 15 06/21/2024 06:06 PM    CREATININE 0.7 06/21/2024 06:06 PM    GLUCOSE 97 06/21/2024 06:06 PM    BILITOT 0.2 06/21/2024 06:06 PM    ALKPHOS 55 06/21/2024 06:06 PM    AST 13 06/21/2024 06:06 PM    ALT 16 06/21/2024 06:06 PM     Lab Results   Component Value Date/Time    WBC 10.5 06/21/2024 06:06 PM    RBC 4.05 06/21/2024 06:06 PM    HGB 13.1 06/21/2024 06:06 PM    HCT 38.5 06/21/2024 06:06 PM    MCV 95.1 06/21/2024 06:06 PM    MCH 32.3 06/21/2024 06:06 PM    MCHC 34.0 06/21/2024 06:06 PM    RDW 12.6 06/21/2024 06:06 PM     06/21/2024 06:06 PM    MPV 8.2 06/21/2024 06:06

## 2024-07-11 NOTE — PROGRESS NOTES
Chest xray performed today was abnormal. Spoke with CNP, Alex Piper who saw patient this morning and she gave VO to order CT chest with contrast. Writer called and spoke with scheduling and added patient to the schedule for today. Scheduling requested her to come right over at her earliest convenience. Writer called patient, Aurelia,  left message for her to return call to the office to notify her of results of chest xray showing that atypical pneumonia couldn't be ruled out and radiologist and provider recommends additional imaging with CT Chest. Patient will need to report to registration as soon as able to have CT chest performed today.

## 2024-07-11 NOTE — PROGRESS NOTES
Contacted patient for follow up to see how she is feeling.  She reports the same.  She states she gets good relief from albuterol inhaler.  She has history of asthma.  Chest xray pending.  Will send prednisone 20 mg BID x 5 days to start.  Informed to take with food, do not take NSAIDs while taking steroids.  Advised if she has SOB, chest pain or difficulty breathing to go to ED.

## 2024-07-15 ENCOUNTER — OFFICE VISIT (OUTPATIENT)
Dept: PRIMARY CARE CLINIC | Age: 29
End: 2024-07-15
Payer: COMMERCIAL

## 2024-07-15 VITALS
HEIGHT: 62 IN | DIASTOLIC BLOOD PRESSURE: 82 MMHG | HEART RATE: 87 BPM | OXYGEN SATURATION: 98 % | RESPIRATION RATE: 18 BRPM | BODY MASS INDEX: 29.08 KG/M2 | TEMPERATURE: 97.1 F | WEIGHT: 158 LBS | SYSTOLIC BLOOD PRESSURE: 120 MMHG

## 2024-07-15 DIAGNOSIS — J45.41 MODERATE PERSISTENT ASTHMA WITH ACUTE EXACERBATION: ICD-10-CM

## 2024-07-15 DIAGNOSIS — J10.1 INFLUENZA A: ICD-10-CM

## 2024-07-15 DIAGNOSIS — J18.9 PNEUMONIA OF BOTH LUNGS DUE TO INFECTIOUS ORGANISM, UNSPECIFIED PART OF LUNG: Primary | ICD-10-CM

## 2024-07-15 DIAGNOSIS — U07.1 COVID: ICD-10-CM

## 2024-07-15 PROCEDURE — 99213 OFFICE O/P EST LOW 20 MIN: CPT | Performed by: NURSE PRACTITIONER

## 2024-07-15 NOTE — PROGRESS NOTES
reflux     Asthma       Reviewed all health maintenance requirements and ordered appropriate tests  Health Maintenance Due   Topic Date Due    COVID-19 Vaccine (1) Never done    Polio vaccine (4 of 4 - 4-dose series) 10/15/1997       Past Surgical History:     Past Surgical History:   Procedure Laterality Date    LEEP  04/01/2022    Dr. Audrey OLIVER N/A 04/01/2022    LEEP performed by Vanesa Torre DO at [facility] OR        Medications:       Prior to Admission medications    Medication Sig Start Date End Date Taking? Authorizing Provider   oseltamivir (TAMIFLU) 75 MG capsule Take 1 capsule by mouth 2 times daily for 5 days 7/11/24 7/16/24 Yes Alex Piper APRN - CNP   predniSONE (DELTASONE) 20 MG tablet Take 1 tablet by mouth 2 times daily for 5 days 7/11/24 7/16/24 Yes Alex Piper APRN - CNP   levoFLOXacin (LEVAQUIN) 750 MG tablet Take 1 tablet by mouth daily for 7 days 7/11/24 7/18/24 Yes Denilson Brandt MD   cetirizine (ZYRTEC) 10 MG tablet Take 1 tablet by mouth daily 6/21/24  Yes Madelaine Fonseca APRN - CNP   albuterol (PROVENTIL) (2.5 MG/3ML) 0.083% nebulizer solution Take 3 mLs by nebulization every 4 hours as needed for Wheezing or Shortness of Breath 6/17/24  Yes Madelaine Fonseca APRN - CNP   albuterol sulfate HFA (VENTOLIN HFA) 108 (90 Base) MCG/ACT inhaler Inhale 2 puffs into the lungs every 4 hours as needed for Wheezing or Shortness of Breath 6/17/24  Yes Madelaine Fonseca APRN - CNP   fluticasone-salmeterol (ADVAIR DISKUS) 250-50 MCG/ACT AEPB diskus inhaler Inhale 1 puff into the lungs in the morning and 1 puff in the evening. 6/17/24  Yes Madelaine Fonseca APRN - CNP   norgestimate-ethinyl estradiol (SPRINTEC 28) 0.25-35 MG-MCG per tablet Take 1 tablet by mouth once daily 6/5/24  Yes Jeni Sanchez APRN - CNM   omeprazole (PRILOSEC) 20 MG delayed release capsule TAKE 1 CAPSULE BY MOUTH ONCE DAILY IN THE MORNING BEFORE BREAKFAST 5/6/24  Yes Denilson Brandt MD montekim

## 2024-07-23 RX ORDER — CETIRIZINE HYDROCHLORIDE 10 MG/1
10 TABLET ORAL DAILY PRN
Qty: 90 TABLET | Refills: 3 | Status: SHIPPED | OUTPATIENT
Start: 2024-07-23

## 2024-08-05 RX ORDER — OMEPRAZOLE 20 MG/1
CAPSULE, DELAYED RELEASE ORAL
Qty: 90 CAPSULE | Refills: 0 | Status: SHIPPED | OUTPATIENT
Start: 2024-08-05

## 2024-08-08 RX ORDER — MONTELUKAST SODIUM 10 MG/1
TABLET ORAL
Qty: 90 TABLET | Refills: 3 | Status: SHIPPED | OUTPATIENT
Start: 2024-08-08

## 2024-08-13 ENCOUNTER — HOSPITAL ENCOUNTER (OUTPATIENT)
Dept: GENERAL RADIOLOGY | Age: 29
Discharge: HOME | End: 2024-08-15
Payer: COMMERCIAL

## 2024-08-13 ENCOUNTER — HOSPITAL ENCOUNTER (OUTPATIENT)
Age: 29
Discharge: HOME | End: 2024-08-15
Payer: COMMERCIAL

## 2024-08-13 DIAGNOSIS — J18.9 PNEUMONIA OF BOTH LUNGS DUE TO INFECTIOUS ORGANISM, UNSPECIFIED PART OF LUNG: ICD-10-CM

## 2024-08-13 PROCEDURE — 71046 X-RAY EXAM CHEST 2 VIEWS: CPT

## 2024-08-21 ENCOUNTER — OFFICE VISIT (OUTPATIENT)
Dept: PULMONOLOGY | Age: 29
End: 2024-08-21
Payer: COMMERCIAL

## 2024-08-21 VITALS
RESPIRATION RATE: 16 BRPM | WEIGHT: 150.5 LBS | HEART RATE: 84 BPM | OXYGEN SATURATION: 97 % | BODY MASS INDEX: 27.7 KG/M2 | DIASTOLIC BLOOD PRESSURE: 60 MMHG | HEIGHT: 62 IN | TEMPERATURE: 96.6 F | SYSTOLIC BLOOD PRESSURE: 118 MMHG

## 2024-08-21 DIAGNOSIS — J45.50 SEVERE PERSISTENT ASTHMA WITHOUT COMPLICATION: Primary | ICD-10-CM

## 2024-08-21 PROCEDURE — 99204 OFFICE O/P NEW MOD 45 MIN: CPT | Performed by: STUDENT IN AN ORGANIZED HEALTH CARE EDUCATION/TRAINING PROGRAM

## 2024-08-21 RX ORDER — FLUTICASONE PROPIONATE AND SALMETEROL 500; 50 UG/1; UG/1
1 POWDER RESPIRATORY (INHALATION) EVERY 12 HOURS
Qty: 60 EACH | Refills: 3 | Status: SHIPPED | OUTPATIENT
Start: 2024-08-21

## 2024-08-21 NOTE — PROGRESS NOTES
[facility]  Office initial visit  ______________________________________________________________________________    Patient: Aurelia Sanders  YOB: 1995   MRN: [id number]    Acct: [id number]     Today's date: 08/21/24    Chief complaint   Worsening shortness of breath, uncontrolled asthma    History of present illness     A 28 y.o. female with PMHx of childhood asthma presented today to the pulmonary clinic for worsening asthma symptoms.  Since June 2024 patient started to have multiple asthma exacerbation and she had prescribed multiple doses of prednisone and antibiotic also she had CTA chest which showed no PE and no pneumonia.  Again in July 2024 she started to have very severe shortness of breath, cough, chest tightness and wheezing she was not able to breathe well for this reason she was reported to the emergency room and she was found to be in respiratory distress and asthma exacerbation.  Also patient was tested positive for COVID-19 pneumonia and influenza A. CT chest from 7/11/2024 showed tree-in-bud and focal pneumonia.  She was treated with slow tapering of prednisone and antibiotic.  For today the patient still complaining from intermittent coughing and copious amount of respiratory secretion also she has intermittent wheezing and shortness of breath which is not like her baseline.  Before last June her asthma was completely controlled by the Advair 250/50 and albuterol as needed.    The CT chest was reviewed from June and July 2024, the 1 from July 2024 showed multifocal pneumonia for which she was already treated with antibiotic.    For today the patient still complaining that her breathing is not like baseline she coughing a lot more than before with copious amount of respiratory secretion she has intermittent wheezing she uses more albuterol than before.  She denies current cigarette smoking.    TOBACCO HISTORY:  She  reports that she quit smoking

## 2024-09-05 ENCOUNTER — HOSPITAL ENCOUNTER (OUTPATIENT)
Age: 29
Discharge: HOME | End: 2024-09-05
Attending: STUDENT IN AN ORGANIZED HEALTH CARE EDUCATION/TRAINING PROGRAM
Payer: COMMERCIAL

## 2024-09-05 ENCOUNTER — HOSPITAL ENCOUNTER (OUTPATIENT)
Dept: PULMONOLOGY | Age: 29
Discharge: HOME | End: 2024-09-05
Attending: STUDENT IN AN ORGANIZED HEALTH CARE EDUCATION/TRAINING PROGRAM
Payer: COMMERCIAL

## 2024-09-05 VITALS — RESPIRATION RATE: 16 BRPM | HEART RATE: 83 BPM | OXYGEN SATURATION: 98 %

## 2024-09-05 DIAGNOSIS — J45.50 SEVERE PERSISTENT ASTHMA WITHOUT COMPLICATION: ICD-10-CM

## 2024-09-05 LAB — EOSINOPHIL # BLD: 2 /UL

## 2024-09-05 PROCEDURE — 82785 ASSAY OF IGE: CPT

## 2024-09-05 PROCEDURE — 94664 DEMO&/EVAL PT USE INHALER: CPT

## 2024-09-05 PROCEDURE — 94729 DIFFUSING CAPACITY: CPT

## 2024-09-05 PROCEDURE — 94726 PLETHYSMOGRAPHY LUNG VOLUMES: CPT

## 2024-09-05 PROCEDURE — 6370000000 HC RX 637 (ALT 250 FOR IP): Performed by: INTERNAL MEDICINE

## 2024-09-05 PROCEDURE — 82784 ASSAY IGA/IGD/IGG/IGM EACH: CPT

## 2024-09-05 PROCEDURE — 86003 ALLG SPEC IGE CRUDE XTRC EA: CPT

## 2024-09-05 PROCEDURE — 36415 COLL VENOUS BLD VENIPUNCTURE: CPT

## 2024-09-05 PROCEDURE — 85048 AUTOMATED LEUKOCYTE COUNT: CPT

## 2024-09-05 PROCEDURE — 94060 EVALUATION OF WHEEZING: CPT

## 2024-09-05 RX ORDER — ALBUTEROL SULFATE 90 UG/1
4 AEROSOL, METERED RESPIRATORY (INHALATION) ONCE
Status: COMPLETED | OUTPATIENT
Start: 2024-09-05 | End: 2024-09-05

## 2024-09-05 RX ADMIN — ALBUTEROL SULFATE 4 PUFF: 90 AEROSOL, METERED RESPIRATORY (INHALATION) at 16:18

## 2024-09-06 LAB
A ALTERNATA IGE QN: <0.1 KU/L (ref 0–0.34)
A FUMIGATUS IGE QN: <0.1 KU/L (ref 0–0.34)
ALLERGEN BIRCH IGE: <0.1 KU/L (ref 0–0.34)
BERMUDA GRASS IGE QN: <0.1 KU/L (ref 0–0.34)
BOXELDER IGE QN: <0.1 KU/L (ref 0–0.34)
C HERBARUM IGE QN: <0.1 KUL/L (ref 0–0.34)
CALIF WALNUT POLN IGE QN: <0.1 KU/L (ref 0–0.34)
CAT DANDER IGE QN: 0.26 KU/L (ref 0–0.34)
CMN PIGWEED IGE QN: ABNORMAL KU/L (ref 0–0.34)
COMMON RAGWEED IGE QN: ABNORMAL KU/L (ref 0–0.34)
COTTONWOOD IGE QN: <0.1 KU/L (ref 0–0.34)
D FARINAE IGE QN: 6.18 KU/L (ref 0–0.34)
D PTERONYSS IGE QN: 9.06 KU/L (ref 0–0.34)
DOG DANDER IGE QN: <0.1 KU/L (ref 0–0.34)
IGA SERPL-MCNC: 146 MG/DL (ref 70–400)
IGE SERPL-ACNC: 51 IU/ML (ref 0–100)
IGE SERPL-ACNC: 52 IU/ML (ref 0–100)
IGG SERPL-MCNC: 924 MG/DL (ref 700–1600)
IGM SERPL-MCNC: 73 MG/DL (ref 40–230)
LONDON PLANE IGE QN: <0.1 KU/L (ref 0–0.34)
M RACEMOSUS IGE QN: <0.1 KU/L (ref 0–0.34)
MOUSE EPITH IGE QN: 23 KU/L (ref 0–0.34)
MT JUNIPER IGE QN: ABNORMAL KU/L (ref 0–0.34)
P NOTATUM IGE QN: <0.1 KU/L (ref 0–0.34)
PECAN/HICK TREE IGE QN: <0.1 KU/L (ref 0–0.34)
ROACH IGE QN: <0.1 KU/L (ref 0–0.34)
SALTWORT IGE QN: ABNORMAL KU/L (ref 0–0.34)
SHEEP SORREL IGE QN: ABNORMAL KU/L (ref 0–0.34)
TIMOTHY IGE QN: <0.1 KU/L (ref 0–0.34)
WHITE ASH IGE QN: <0.1 KU/L (ref 0–0.34)
WHITE ELM IGE QN: ABNORMAL KU/L (ref 0–0.34)
WHITE MULBERRY IGE QN: ABNORMAL KU/L (ref 0–0.34)
WHITE OAK IGE QN: ABNORMAL KU/L (ref 0–0.34)

## 2024-09-07 NOTE — PROCEDURES
[address] [address]                           PULMONARY FUNCTION      PATIENT NAME: TITO BHATIA            : 1995  MED REC NO: [id number]                          ROOM:   ACCOUNT NO: [id number]                       ADMIT DATE: 2024  PROVIDER: Saturnino Thompson MD      DATE OF PROCEDURE: 2024    Spirometry does not meet the criteria for obstruction and has an FEV1 of 3.14 L and FVC of 3.76 L.    The postbronchodilator study does not show any change of significance and has an FEV1 of 3.16 L and FVC of 3.74 L.    Lung volumes show total lung capacity that is normal at 99% predicted.    Diffusing capacity is normal at 112% predicted.    Airway resistance is decreased at 33% predicted.    Specific conductance is increased at 184% predicted.    Flow volume loop does not show any obstructive ventricular pattern.    IMPRESSION:  Normal pulmonary function test with an FEV1 of 3.16 L and FVC of 3.76 L.          SATURNINO THOMPSON MD      D:  2024 08:53:11     T:  2024 10:02:50     SK/ALANA  Job #:  [id number]     Doc#:  [id number]

## 2024-09-09 LAB
A ALTERNATA IGE QN: <0.1 KU/L (ref 0–0.34)
A FUMIGATUS IGE QN: <0.1 KU/L (ref 0–0.34)
ALLERGEN BIRCH IGE: <0.1 KU/L (ref 0–0.34)
BERMUDA GRASS IGE QN: <0.1 KU/L (ref 0–0.34)
BOXELDER IGE QN: <0.1 KU/L (ref 0–0.34)
C HERBARUM IGE QN: <0.1 KUL/L (ref 0–0.34)
CALIF WALNUT POLN IGE QN: <0.1 KU/L (ref 0–0.34)
CAT DANDER IGE QN: 0.26 KU/L (ref 0–0.34)
CMN PIGWEED IGE QN: <0.1 KU/L (ref 0–0.34)
COMMON RAGWEED IGE QN: <0.1 KU/L (ref 0–0.34)
COTTONWOOD IGE QN: <0.1 KU/L (ref 0–0.34)
D FARINAE IGE QN: 6.18 KU/L (ref 0–0.34)
D PTERONYSS IGE QN: 9.06 KU/L (ref 0–0.34)
DOG DANDER IGE QN: <0.1 KU/L (ref 0–0.34)
IGE SERPL-ACNC: 52 IU/ML (ref 0–100)
LONDON PLANE IGE QN: <0.1 KU/L (ref 0–0.34)
M RACEMOSUS IGE QN: <0.1 KU/L (ref 0–0.34)
MOUSE EPITH IGE QN: 23 KU/L (ref 0–0.34)
MT JUNIPER IGE QN: <0.1 KU/L (ref 0–0.34)
P NOTATUM IGE QN: <0.1 KU/L (ref 0–0.34)
PECAN/HICK TREE IGE QN: <0.1 KU/L (ref 0–0.34)
ROACH IGE QN: <0.1 KU/L (ref 0–0.34)
SALTWORT IGE QN: <0.1 KU/L (ref 0–0.34)
SHEEP SORREL IGE QN: <0.1 KU/L (ref 0–0.34)
TIMOTHY IGE QN: <0.1 KU/L (ref 0–0.34)
WHITE ASH IGE QN: <0.1 KU/L (ref 0–0.34)
WHITE ELM IGE QN: <0.1 KU/L (ref 0–0.34)
WHITE MULBERRY IGE QN: <0.1 KU/L (ref 0–0.34)
WHITE OAK IGE QN: <0.1 KU/L (ref 0–0.34)

## 2024-09-13 ENCOUNTER — OFFICE VISIT (OUTPATIENT)
Dept: PRIMARY CARE CLINIC | Age: 29
End: 2024-09-13

## 2024-09-13 VITALS
HEART RATE: 106 BPM | WEIGHT: 150 LBS | OXYGEN SATURATION: 99 % | SYSTOLIC BLOOD PRESSURE: 110 MMHG | BODY MASS INDEX: 27.44 KG/M2 | DIASTOLIC BLOOD PRESSURE: 72 MMHG

## 2024-09-13 DIAGNOSIS — B00.1 COLD SORE: Primary | ICD-10-CM

## 2024-09-13 RX ORDER — VALACYCLOVIR HYDROCHLORIDE 1 G/1
TABLET, FILM COATED ORAL
Qty: 4 TABLET | Refills: 4 | Status: SHIPPED | OUTPATIENT
Start: 2024-09-13

## 2024-10-09 ENCOUNTER — HOSPITAL ENCOUNTER (OUTPATIENT)
Age: 29
Discharge: HOME | End: 2024-10-09
Payer: COMMERCIAL

## 2024-10-09 ENCOUNTER — OFFICE VISIT (OUTPATIENT)
Dept: PRIMARY CARE CLINIC | Age: 29
End: 2024-10-09
Payer: COMMERCIAL

## 2024-10-09 ENCOUNTER — HOSPITAL ENCOUNTER (OUTPATIENT)
Dept: GENERAL RADIOLOGY | Age: 29
Discharge: HOME | End: 2024-10-11
Payer: COMMERCIAL

## 2024-10-09 ENCOUNTER — HOSPITAL ENCOUNTER (OUTPATIENT)
Age: 29
Discharge: HOME | End: 2024-10-11
Payer: COMMERCIAL

## 2024-10-09 VITALS
HEART RATE: 102 BPM | DIASTOLIC BLOOD PRESSURE: 70 MMHG | OXYGEN SATURATION: 100 % | SYSTOLIC BLOOD PRESSURE: 112 MMHG | WEIGHT: 149 LBS | TEMPERATURE: 97.3 F | BODY MASS INDEX: 27.25 KG/M2

## 2024-10-09 DIAGNOSIS — R42 DIZZINESS: ICD-10-CM

## 2024-10-09 DIAGNOSIS — M79.671 RIGHT FOOT PAIN: ICD-10-CM

## 2024-10-09 DIAGNOSIS — R11.0 NAUSEA: ICD-10-CM

## 2024-10-09 DIAGNOSIS — M79.671 RIGHT FOOT PAIN: Primary | ICD-10-CM

## 2024-10-09 DIAGNOSIS — R29.898 RIGHT HAND WEAKNESS: ICD-10-CM

## 2024-10-09 LAB
ANION GAP SERPL CALCULATED.3IONS-SCNC: 14 MMOL/L (ref 9–16)
BUN SERPL-MCNC: 9 MG/DL (ref 6–20)
BUN/CREAT SERPL: 10 (ref 9–20)
CALCIUM SERPL-MCNC: 9.2 MG/DL (ref 8.6–10.4)
CHLORIDE SERPL-SCNC: 104 MMOL/L (ref 98–107)
CO2 SERPL-SCNC: 22 MMOL/L (ref 20–31)
CREAT SERPL-MCNC: 0.9 MG/DL (ref 0.5–0.9)
ERYTHROCYTE [DISTWIDTH] IN BLOOD BY AUTOMATED COUNT: 11.8 % (ref 11.8–14.4)
GFR, ESTIMATED: >60 ML/MIN/1.73M2
GLUCOSE SERPL-MCNC: 90 MG/DL (ref 74–99)
HCG SERPL QL: NEGATIVE
HCT VFR BLD AUTO: 40.5 % (ref 36.3–47.1)
HGB BLD-MCNC: 13.3 G/DL (ref 11.9–15.1)
MCH RBC QN AUTO: 31.6 PG (ref 25.2–33.5)
MCHC RBC AUTO-ENTMCNC: 32.8 G/DL (ref 28.4–34.8)
MCV RBC AUTO: 96.2 FL (ref 82.6–102.9)
NRBC BLD-RTO: 0 PER 100 WBC
PLATELET # BLD AUTO: 282 K/UL (ref 138–453)
PMV BLD AUTO: 8.5 FL (ref 8.1–13.5)
POTASSIUM SERPL-SCNC: 4 MMOL/L (ref 3.7–5.3)
RBC # BLD AUTO: 4.21 M/UL (ref 3.95–5.11)
SODIUM SERPL-SCNC: 140 MMOL/L (ref 136–145)
TSH SERPL DL<=0.05 MIU/L-ACNC: 0.34 UIU/ML (ref 0.27–4.2)
WBC OTHER # BLD: 5.6 K/UL (ref 3.5–11.3)

## 2024-10-09 PROCEDURE — 93005 ELECTROCARDIOGRAM TRACING: CPT

## 2024-10-09 PROCEDURE — 85027 COMPLETE CBC AUTOMATED: CPT

## 2024-10-09 PROCEDURE — 36415 COLL VENOUS BLD VENIPUNCTURE: CPT

## 2024-10-09 PROCEDURE — 80048 BASIC METABOLIC PNL TOTAL CA: CPT

## 2024-10-09 PROCEDURE — 73630 X-RAY EXAM OF FOOT: CPT

## 2024-10-09 PROCEDURE — 84443 ASSAY THYROID STIM HORMONE: CPT

## 2024-10-09 PROCEDURE — 84703 CHORIONIC GONADOTROPIN ASSAY: CPT

## 2024-10-09 PROCEDURE — 99214 OFFICE O/P EST MOD 30 MIN: CPT | Performed by: NURSE PRACTITIONER

## 2024-10-09 NOTE — PROGRESS NOTES
Comments: Point tenderness to plantar aspect of base of right second toe as noted in diagram.  No overlying skin changes.  Flexion of right toes and right ankle WNL.  Neurological:      Mental Status: She is alert.   Psychiatric:         Mood and Affect: Mood normal.         Behavior: Behavior normal.         Thought Content: Thought content normal.         Judgment: Judgment normal.         Data:     Lab Results   Component Value Date/Time     06/21/2024 06:06 PM    K 3.4 06/21/2024 06:06 PM     06/21/2024 06:06 PM    CO2 25 06/21/2024 06:06 PM    BUN 15 06/21/2024 06:06 PM    CREATININE 0.7 06/21/2024 06:06 PM    GLUCOSE 97 06/21/2024 06:06 PM    BILITOT 0.2 06/21/2024 06:06 PM    ALKPHOS 55 06/21/2024 06:06 PM    AST 13 06/21/2024 06:06 PM    ALT 16 06/21/2024 06:06 PM     Lab Results   Component Value Date/Time    WBC 10.5 06/21/2024 06:06 PM    RBC 4.05 06/21/2024 06:06 PM    HGB 13.1 06/21/2024 06:06 PM    HCT 38.5 06/21/2024 06:06 PM    MCV 95.1 06/21/2024 06:06 PM    MCH 32.3 06/21/2024 06:06 PM    MCHC 34.0 06/21/2024 06:06 PM    RDW 12.6 06/21/2024 06:06 PM     06/21/2024 06:06 PM    MPV 8.2 06/21/2024 06:06 PM     Lab Results   Component Value Date/Time    TSH 1.17 05/03/2023 04:06 PM     Lab Results   Component Value Date/Time    CHOL 214 05/25/2024 08:22 AM     05/25/2024 08:22 AM    HDL 58 05/25/2024 08:22 AM    LABA1C 5.3 05/25/2024 08:21 AM       Assessment/Plan:      Diagnosis Orders   1. Right foot pain [M79.671]          Dizziness, nausea:  - Resolved  - Further workup with EKG and basic labs as noted above   - She does have mild effusion left TM. Concerning for vertigo dx based on history.  Continue to monitor at this time.  She was instructed to notify office if dizziness occurs again.  If so, will initiate further workup with head CT, etc. consider PT for vertigo maneuvers  - Encouraged her to remain well-hydrated with water and avoid skipping meals to prevent

## 2024-10-10 LAB
EKG ATRIAL RATE: 68 BPM
EKG P AXIS: -9 DEGREES
EKG P-R INTERVAL: 132 MS
EKG Q-T INTERVAL: 384 MS
EKG QRS DURATION: 80 MS
EKG QTC CALCULATION (BAZETT): 408 MS
EKG R AXIS: 21 DEGREES
EKG T AXIS: 17 DEGREES
EKG VENTRICULAR RATE: 68 BPM

## 2024-11-07 ENCOUNTER — OFFICE VISIT (OUTPATIENT)
Dept: PRIMARY CARE CLINIC | Age: 29
End: 2024-11-07
Payer: COMMERCIAL

## 2024-11-07 VITALS
BODY MASS INDEX: 27.07 KG/M2 | DIASTOLIC BLOOD PRESSURE: 62 MMHG | OXYGEN SATURATION: 99 % | TEMPERATURE: 96.8 F | HEART RATE: 87 BPM | SYSTOLIC BLOOD PRESSURE: 102 MMHG | WEIGHT: 148 LBS

## 2024-11-07 DIAGNOSIS — J40 BRONCHITIS: ICD-10-CM

## 2024-11-07 DIAGNOSIS — R42 DIZZINESS: Primary | ICD-10-CM

## 2024-11-07 DIAGNOSIS — R29.898 RIGHT HAND WEAKNESS: ICD-10-CM

## 2024-11-07 DIAGNOSIS — M79.671 RIGHT FOOT PAIN: ICD-10-CM

## 2024-11-07 PROCEDURE — 99214 OFFICE O/P EST MOD 30 MIN: CPT | Performed by: NURSE PRACTITIONER

## 2024-11-07 RX ORDER — AZITHROMYCIN 250 MG/1
TABLET, FILM COATED ORAL
Qty: 6 TABLET | Refills: 0 | Status: SHIPPED | OUTPATIENT
Start: 2024-11-07

## 2024-11-07 RX ORDER — PREDNISONE 20 MG/1
20 TABLET ORAL 2 TIMES DAILY
Qty: 10 TABLET | Refills: 0 | Status: SHIPPED | OUTPATIENT
Start: 2024-11-07 | End: 2024-11-12

## 2024-11-07 NOTE — PROGRESS NOTES
05:28 PM    CREATININE 0.9 10/09/2024 05:28 PM    GLUCOSE 90 10/09/2024 05:28 PM    BILITOT 0.2 06/21/2024 06:06 PM    ALKPHOS 55 06/21/2024 06:06 PM    AST 13 06/21/2024 06:06 PM    ALT 16 06/21/2024 06:06 PM     Lab Results   Component Value Date/Time    WBC 5.6 10/09/2024 05:28 PM    RBC 4.21 10/09/2024 05:28 PM    HGB 13.3 10/09/2024 05:28 PM    HCT 40.5 10/09/2024 05:28 PM    MCV 96.2 10/09/2024 05:28 PM    MCH 31.6 10/09/2024 05:28 PM    MCHC 32.8 10/09/2024 05:28 PM    RDW 11.8 10/09/2024 05:28 PM     10/09/2024 05:28 PM    MPV 8.5 10/09/2024 05:28 PM     Lab Results   Component Value Date/Time    TSH 0.34 10/09/2024 05:28 PM     Lab Results   Component Value Date/Time    CHOL 214 05/25/2024 08:22 AM     05/25/2024 08:22 AM    HDL 58 05/25/2024 08:22 AM    LABA1C 5.3 05/25/2024 08:21 AM       Assessment/Plan:      Diagnosis Orders   1. Dizziness [R42]        2. Right foot pain [M79.671]        3. Right hand weakness        4. Bronchitis            Foot pain:  - Resolved    Dizziness:  - Resolved    Right hand weakness:  - Concerning for carpal tunnel  - Scheduled for EMG tomorrow  - Will call with above EMG results and update treatment plan/follow-up as appropriate    Bronchitis:  - Patient has strong history of asthma.  Continue all respiratory medications per pulmonology including albuterol, Advair, nebulizer machine  - Will add azithromycin  - Will also prescribe prednisone for patient to take if persistent chest tightness, wheezing, shortness of breath  - Notify office if symptoms worsen or persist    -- Reviewed emergent signs and symptoms and when to seek care at the emergency department and/or call 911     Completed Refills   Requested Prescriptions     Signed Prescriptions Disp Refills    azithromycin (ZITHROMAX Z-LEVI) 250 MG tablet 6 tablet 0     Sig: Take 2 tablets (500mg) by mouth once daily on day 1. Take 1 tablet (250mg) by mouth once daily on days 2 - 5.    predniSONE (DELTASONE)

## 2024-11-12 ENCOUNTER — TELEPHONE (OUTPATIENT)
Dept: PRIMARY CARE CLINIC | Age: 29
End: 2024-11-12

## 2024-11-12 DIAGNOSIS — R20.0 NUMBNESS OF RIGHT HAND: Primary | ICD-10-CM

## 2024-11-12 DIAGNOSIS — R29.898 RIGHT HAND WEAKNESS: ICD-10-CM

## 2024-11-12 NOTE — TELEPHONE ENCOUNTER
Please notify EMG results are okay, no significant abnormality. At this time, I would recommend referral to OT with f/u in 6 weeks. If agreeable please place referral to [facility] OT (or patient choice) and schedule f/u 6 weeks dx: right hand numbness

## 2024-11-13 NOTE — TELEPHONE ENCOUNTER
Patient agreeable to OT, will call when she started OT to schedule 6 week FU. OT referral placed.

## 2024-12-12 ENCOUNTER — OFFICE VISIT (OUTPATIENT)
Dept: PULMONOLOGY | Age: 29
End: 2024-12-12
Payer: COMMERCIAL

## 2024-12-12 VITALS
TEMPERATURE: 96.9 F | HEART RATE: 80 BPM | SYSTOLIC BLOOD PRESSURE: 115 MMHG | HEIGHT: 62 IN | RESPIRATION RATE: 20 BRPM | WEIGHT: 148 LBS | OXYGEN SATURATION: 97 % | DIASTOLIC BLOOD PRESSURE: 63 MMHG | BODY MASS INDEX: 27.23 KG/M2

## 2024-12-12 DIAGNOSIS — J45.50 UNCONTROLLED SEVERE PERSISTENT ASTHMA: Primary | ICD-10-CM

## 2024-12-12 PROCEDURE — 99213 OFFICE O/P EST LOW 20 MIN: CPT | Performed by: STUDENT IN AN ORGANIZED HEALTH CARE EDUCATION/TRAINING PROGRAM

## 2024-12-12 RX ORDER — AZITHROMYCIN 250 MG/1
TABLET, FILM COATED ORAL
Qty: 6 TABLET | Refills: 0 | Status: SHIPPED | OUTPATIENT
Start: 2024-12-12 | End: 2024-12-22

## 2024-12-12 RX ORDER — TEZEPELUMAB-EKKO 210 MG/1.9ML
210 INJECTION, SOLUTION SUBCUTANEOUS
Qty: 1 EACH | Refills: 6 | Status: SHIPPED | OUTPATIENT
Start: 2024-12-12

## 2024-12-12 RX ORDER — PREDNISONE 20 MG/1
40 TABLET ORAL DAILY
Qty: 10 TABLET | Refills: 0 | Status: SHIPPED | OUTPATIENT
Start: 2024-12-12 | End: 2024-12-17

## 2024-12-12 NOTE — PROGRESS NOTES
[facility]  Office visit follow-up  ______________________________________________________________________________    Patient: Aruelia Sanders  YOB: 1995   MRN: [id number]    Acct: [id number]     Today's date: 12/12/24    Chief complaint   Follow up     History of present illness     A 29 y.o. female with uncontrolled severe asthma presented today to the pulmonary clinic for follow-up.   She was last time seen in the clinic on 8/21/2024.  At that time the patient had recent exacerbation secondary to COVID-19 pneumonia and she was reporting worsening shortness of breath, cough, wheezing.  She was already on Advair 500/50 twice a day and albuterol as needed.  I added last visit Spiriva.  In the last 4 months the patient reported at least 7 to exacerbation of asthma the most recent 1 was in the end of November she had to receive systemic steroid and antibiotic.  In the last 6 months she had at least 3-4 times asthma exacerbation.  For today the patient reported significant worsening shortness of breath, cough, wheezing and stating exacerbation almost every month.  Not sure about the trigger but upper respiratory infection versus cold weather might be the trigger.  Despite being on maximum inhaler therapy she is getting the multiple exacerbation.  June 2024 CTA chest which showed no PE and no pneumonia.  July 2024 CT chest from 7/11/2024 showed tree-in-bud and focal pneumonia.  She was treated with slow tapering of prednisone and antibiotic.  Eosinophil count, IgE 9/5/2024 is within normal limit  Respiratory allergen panel all negative.    ROS:     Constitutional:no fever, no chills  HEENT: no runny nose, no sore throat  Respiratory: Mild dyspnea, intermittent cough, intermittent wheeze, no sputum production  CVS: no chest pain, no palpitations, no syncope  Gi: no abdominal pain, no nausea, no vomiting, no diarrhea.  MSK: no myalgia, no joint pain.  Skin: no

## 2024-12-12 NOTE — PATIENT INSTRUCTIONS
SURVEY:    Thank you for allowing us to care for you today.    You may be receiving a survey from [facility] regarding your visit today- electronically or via mail.      Please help us by completing the survey as this will provide the needed feedback to ensure we are providing the very best care for you and your family.    If you cannot score us a very good on any question, please call the office to discuss how we could have made your experience a very good one.    Thank you.       STAFF:    Melany Huston CMA, Ximena CARL CMA      CLINICAL STAFF:    Krista LAI LPN, Maria M LU LPN, Sil CARL LPN, Felicitas KILPATRICK, Gemma KILPATRICK,

## 2024-12-21 DIAGNOSIS — J45.50 SEVERE PERSISTENT ASTHMA WITHOUT COMPLICATION: ICD-10-CM

## 2024-12-23 RX ORDER — FLUTICASONE PROPIONATE AND SALMETEROL 500; 50 UG/1; UG/1
POWDER RESPIRATORY (INHALATION)
Qty: 60 EACH | Refills: 0 | Status: SHIPPED | OUTPATIENT
Start: 2024-12-23

## 2024-12-23 RX ORDER — TIOTROPIUM BROMIDE INHALATION SPRAY 3.12 UG/1
SPRAY, METERED RESPIRATORY (INHALATION)
Qty: 4 G | Refills: 0 | Status: SHIPPED | OUTPATIENT
Start: 2024-12-23

## 2024-12-23 NOTE — TELEPHONE ENCOUNTER
We received a refill request from [facility] for Advair and Spiriva. Next office visit is 6/26/25. Please advise.

## 2025-01-30 ENCOUNTER — TELEPHONE (OUTPATIENT)
Dept: PULMONOLOGY | Age: 30
End: 2025-01-30

## 2025-01-30 DIAGNOSIS — R06.02 SOB (SHORTNESS OF BREATH): Primary | ICD-10-CM

## 2025-01-30 NOTE — TELEPHONE ENCOUNTER
Patient called stating she has been having \"issues\" with her asthma for \"about 3 weeks\". She stated she took the Azithromycin and Prednisone you prescribed, finished on 1/25/25, and is still having a cough with mucus. 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 Patient stated her mother is a nurse and is concerned about Legionella Exposure. She is asking if she can be tested for this? Next appointment is 6/26/25. Please advise.

## 2025-01-31 RX ORDER — LEVOFLOXACIN 750 MG/1
750 TABLET, FILM COATED ORAL DAILY
Qty: 7 TABLET | Refills: 0 | Status: SHIPPED | OUTPATIENT
Start: 2025-01-31 | End: 2025-02-07

## 2025-01-31 NOTE — TELEPHONE ENCOUNTER
Left voicemail message that testing and Rx was ordered and to call office with any additional questions.  Informed will also send message in [place].

## 2025-02-03 ENCOUNTER — HOSPITAL ENCOUNTER (OUTPATIENT)
Age: 30
Discharge: HOME | End: 2025-02-03
Payer: COMMERCIAL

## 2025-02-03 ENCOUNTER — HOSPITAL ENCOUNTER (OUTPATIENT)
Dept: GENERAL RADIOLOGY | Age: 30
Discharge: HOME | End: 2025-02-05
Payer: COMMERCIAL

## 2025-02-03 ENCOUNTER — HOSPITAL ENCOUNTER (OUTPATIENT)
Age: 30
Discharge: HOME | End: 2025-02-05
Payer: COMMERCIAL

## 2025-02-03 DIAGNOSIS — R06.02 SOB (SHORTNESS OF BREATH): ICD-10-CM

## 2025-02-03 PROCEDURE — 87449 NOS EACH ORGANISM AG IA: CPT

## 2025-02-03 PROCEDURE — 71045 X-RAY EXAM CHEST 1 VIEW: CPT

## 2025-02-04 LAB — L PNEUMO1 AG UR QL IA.RAPID: NEGATIVE

## 2025-02-05 DIAGNOSIS — J45.50 SEVERE PERSISTENT ASTHMA WITHOUT COMPLICATION: ICD-10-CM

## 2025-02-05 RX ORDER — FLUTICASONE PROPIONATE AND SALMETEROL 500; 50 UG/1; UG/1
POWDER RESPIRATORY (INHALATION)
Qty: 60 EACH | Refills: 5 | Status: SHIPPED | OUTPATIENT
Start: 2025-02-05

## 2025-02-05 NOTE — TELEPHONE ENCOUNTER
We received a refill request from [facility] for Advair. Next office visit is 6/26/25. Please advise.

## 2025-02-14 ENCOUNTER — TELEPHONE (OUTPATIENT)
Dept: PULMONOLOGY | Age: 30
End: 2025-02-14

## 2025-02-14 NOTE — TELEPHONE ENCOUNTER
Patient's insurance does not cover Tezspire. Dupixent is the preferred medication and they are requesting we try that. Please advise.

## 2025-03-07 DIAGNOSIS — J45.50 SEVERE PERSISTENT ASTHMA WITHOUT COMPLICATION (HCC): ICD-10-CM

## 2025-03-07 RX ORDER — TIOTROPIUM BROMIDE INHALATION SPRAY 3.12 UG/1
SPRAY, METERED RESPIRATORY (INHALATION)
Qty: 4 G | Refills: 0 | Status: SHIPPED | OUTPATIENT
Start: 2025-03-07

## 2025-03-14 ENCOUNTER — TELEPHONE (OUTPATIENT)
Dept: PULMONOLOGY | Age: 30
End: 2025-03-14

## 2025-03-14 NOTE — TELEPHONE ENCOUNTER
Deanne called in and left a voicemail requesting a call back regarding her asthma. I called her back and left her a voicemail that the Tezspire was denied by her insurance. The preferred medication by her insurance was Dupixent. That prescription was ordered. [facility] [employer] was working on PA. Gemma Thomson reached out to them and they have resubmitted a PA with a new ICD-10 code. We will update her as soon as we hear from them. To call with any questions.

## 2025-03-17 ENCOUNTER — HOSPITAL ENCOUNTER (OUTPATIENT)
Dept: GENERAL RADIOLOGY | Age: 30
Discharge: HOME | End: 2025-03-19
Payer: COMMERCIAL

## 2025-03-17 ENCOUNTER — OFFICE VISIT (OUTPATIENT)
Dept: PRIMARY CARE CLINIC | Age: 30
End: 2025-03-17
Payer: COMMERCIAL

## 2025-03-17 ENCOUNTER — HOSPITAL ENCOUNTER (OUTPATIENT)
Age: 30
Discharge: HOME | End: 2025-03-19
Payer: COMMERCIAL

## 2025-03-17 VITALS
BODY MASS INDEX: 26.34 KG/M2 | OXYGEN SATURATION: 98 % | DIASTOLIC BLOOD PRESSURE: 64 MMHG | WEIGHT: 144 LBS | SYSTOLIC BLOOD PRESSURE: 98 MMHG | HEART RATE: 79 BPM

## 2025-03-17 DIAGNOSIS — J45.50 SEVERE PERSISTENT ASTHMA WITHOUT COMPLICATION (HCC): ICD-10-CM

## 2025-03-17 DIAGNOSIS — K21.9 GASTROESOPHAGEAL REFLUX DISEASE WITHOUT ESOPHAGITIS: ICD-10-CM

## 2025-03-17 DIAGNOSIS — R05.2 SUBACUTE COUGH: ICD-10-CM

## 2025-03-17 DIAGNOSIS — R05.2 SUBACUTE COUGH: Primary | ICD-10-CM

## 2025-03-17 PROCEDURE — 71046 X-RAY EXAM CHEST 2 VIEWS: CPT

## 2025-03-17 PROCEDURE — G2211 COMPLEX E/M VISIT ADD ON: HCPCS | Performed by: FAMILY MEDICINE

## 2025-03-17 PROCEDURE — 99214 OFFICE O/P EST MOD 30 MIN: CPT | Performed by: FAMILY MEDICINE

## 2025-03-17 SDOH — ECONOMIC STABILITY: FOOD INSECURITY: WITHIN THE PAST 12 MONTHS, YOU WORRIED THAT YOUR FOOD WOULD RUN OUT BEFORE YOU GOT MONEY TO BUY MORE.: NEVER TRUE

## 2025-03-17 SDOH — ECONOMIC STABILITY: FOOD INSECURITY: WITHIN THE PAST 12 MONTHS, THE FOOD YOU BOUGHT JUST DIDN'T LAST AND YOU DIDN'T HAVE MONEY TO GET MORE.: NEVER TRUE

## 2025-03-17 ASSESSMENT — PATIENT HEALTH QUESTIONNAIRE - PHQ9
SUM OF ALL RESPONSES TO PHQ QUESTIONS 1-9: 0
2. FEELING DOWN, DEPRESSED OR HOPELESS: NOT AT ALL
1. LITTLE INTEREST OR PLEASURE IN DOING THINGS: NOT AT ALL

## 2025-03-17 NOTE — PROGRESS NOTES
patient (or guardian, if applicable) and other individuals in attendance at the appointment consented to the use of AI, including the recording.

## 2025-03-18 ENCOUNTER — RESULTS FOLLOW-UP (OUTPATIENT)
Dept: GENERAL RADIOLOGY | Age: 30
End: 2025-03-18

## 2025-03-26 ENCOUNTER — HOSPITAL ENCOUNTER (OUTPATIENT)
Age: 30
Setting detail: SPECIMEN
Discharge: HOME | End: 2025-03-26
Payer: COMMERCIAL

## 2025-03-26 ENCOUNTER — OFFICE VISIT (OUTPATIENT)
Dept: OBGYN | Age: 30
End: 2025-03-26
Payer: COMMERCIAL

## 2025-03-26 VITALS
DIASTOLIC BLOOD PRESSURE: 82 MMHG | BODY MASS INDEX: 26.79 KG/M2 | SYSTOLIC BLOOD PRESSURE: 122 MMHG | WEIGHT: 145.6 LBS | HEIGHT: 62 IN

## 2025-03-26 DIAGNOSIS — N76.0 VULVOVAGINITIS: ICD-10-CM

## 2025-03-26 DIAGNOSIS — Z12.4 SCREENING FOR CERVICAL CANCER: ICD-10-CM

## 2025-03-26 DIAGNOSIS — Z01.419 ENCOUNTER FOR ANNUAL ROUTINE GYNECOLOGICAL EXAMINATION: Primary | ICD-10-CM

## 2025-03-26 PROCEDURE — 87070 CULTURE OTHR SPECIMN AEROBIC: CPT

## 2025-03-26 PROCEDURE — 87660 TRICHOMONAS VAGIN DIR PROBE: CPT

## 2025-03-26 PROCEDURE — 99395 PREV VISIT EST AGE 18-39: CPT | Performed by: ADVANCED PRACTICE MIDWIFE

## 2025-03-26 PROCEDURE — 87624 HPV HI-RISK TYP POOLED RSLT: CPT

## 2025-03-26 PROCEDURE — 88175 CYTOPATH C/V AUTO FLUID REDO: CPT

## 2025-03-26 PROCEDURE — 87510 GARDNER VAG DNA DIR PROBE: CPT

## 2025-03-26 PROCEDURE — 87480 CANDIDA DNA DIR PROBE: CPT

## 2025-03-26 RX ORDER — CLOTRIMAZOLE AND BETAMETHASONE DIPROPIONATE 10; .64 MG/G; MG/G
CREAM TOPICAL
Qty: 45 G | Refills: 1 | Status: SHIPPED | OUTPATIENT
Start: 2025-03-26

## 2025-03-26 NOTE — PROGRESS NOTES
YEARLY PHYSICAL    Date of service: 3/26/2025    Aurelia Sanders  Is a 29 y.o.  single, female    PT's PCP is: Madelaine Fonseca APRN - CNP     : 1995                                             Subjective:       Patient's last menstrual period was 2025 (approximate).     Are your menses regular: yes    OB History    Para Term  AB Living   2 2 2   2   SAB IAB Ectopic Molar Multiple Live Births       0 2      # Outcome Date GA Lbr Phillip/2nd Weight Sex Type Anes PTL Lv   2 Term 23 40w0d / 00:34 3.526 kg (7 lb 12.4 oz) M Vag-Spont None N ZENON   1 Term 10/18/20 39w0d / 00:25 2.778 kg (6 lb 2 oz) M Vag-Spont Local N ZENON      Complications: Precipitous Labor        Social History     Tobacco Use   Smoking Status Former    Current packs/day: 0.00    Types: Cigarettes    Quit date: 3/3/2020    Years since quittin.0   Smokeless Tobacco Never        Social History     Substance and Sexual Activity   Alcohol Use Yes    Comment: rarely       Family History   Problem Relation Age of Onset    GERD Mother     Diabetes Father     Hypertension Father     Elevated Lipids Father     Hemochromatosis Brother     Dementia Maternal Grandmother     No Known Problems Maternal Grandfather     Osteoporosis Paternal Grandmother     Hypertension Paternal Grandfather     Heart Disease Paternal Grandfather     Stroke Paternal Grandfather     Breast Cancer Neg Hx     Ovarian Cancer Neg Hx        Any family history of breast or ovarian cancer: No    Any family history of blood clots: No    Allergies: Patient has no known allergies.      Current Outpatient Medications:     clotrimazole-betamethasone (LOTRISONE) 1-0.05 % cream, Apply topically 2 times daily., Disp: 45 g, Rfl: 1    SPIRIVA RESPIMAT 2.5 MCG/ACT AERS inhaler, INHALE 2 SPRAY(S) BY MOUTH ONCE DAILY, Disp: 4 g, Rfl: 0    fluticasone-salmeterol (ADVAIR) 500-50 MCG/ACT AEPB diskus inhaler,

## 2025-03-27 ENCOUNTER — RESULTS FOLLOW-UP (OUTPATIENT)
Dept: OBGYN | Age: 30
End: 2025-03-27

## 2025-03-27 LAB
CANDIDA SPECIES: POSITIVE
GARDNERELLA VAGINALIS: POSITIVE
SOURCE: ABNORMAL
TRICHOMONAS: NEGATIVE

## 2025-03-27 RX ORDER — FLUCONAZOLE 150 MG/1
TABLET ORAL
Qty: 3 TABLET | Refills: 0 | Status: SHIPPED | OUTPATIENT
Start: 2025-03-27

## 2025-03-27 RX ORDER — METRONIDAZOLE 500 MG/1
500 TABLET ORAL 2 TIMES DAILY
Qty: 14 TABLET | Refills: 0 | Status: SHIPPED | OUTPATIENT
Start: 2025-03-27 | End: 2025-04-03

## 2025-03-27 NOTE — TELEPHONE ENCOUNTER
I called Kaitlyn Whittaker at [facility]. She said that the Dupixent has been denied by her insurance. Denial letter is scanned into the chart under media. Please advise how you would like to proceed.

## 2025-03-28 LAB
HPV I/H RISK 4 DNA CVX QL NAA+PROBE: NOT DETECTED
HPV SAMPLE: NORMAL
HPV, INTERPRETATION: NORMAL
HPV16 DNA CVX QL NAA+PROBE: NOT DETECTED
HPV18 DNA CVX QL NAA+PROBE: NOT DETECTED
SPECIMEN DESCRIPTION: NORMAL

## 2025-03-28 ASSESSMENT — ENCOUNTER SYMPTOMS
SHORTNESS OF BREATH: 0
ABDOMINAL PAIN: 0
BACK PAIN: 0

## 2025-03-29 LAB
MICROORGANISM SPEC CULT: ABNORMAL
SERVICE CMNT-IMP: ABNORMAL
SPECIMEN DESCRIPTION: ABNORMAL

## 2025-04-04 LAB — CYTOLOGY REPORT: NORMAL

## 2025-04-05 DIAGNOSIS — J45.50 SEVERE PERSISTENT ASTHMA WITHOUT COMPLICATION (HCC): ICD-10-CM

## 2025-04-07 NOTE — TELEPHONE ENCOUNTER
We received a ref[facility] from [facility] for Spiriva. Next office visit is 7/10/25. Please advise.

## 2025-04-13 RX ORDER — TIOTROPIUM BROMIDE INHALATION SPRAY 3.12 UG/1
SPRAY, METERED RESPIRATORY (INHALATION)
Qty: 4 G | Refills: 4 | Status: SHIPPED | OUTPATIENT
Start: 2025-04-13

## 2025-04-14 NOTE — TELEPHONE ENCOUNTER
Left voicemail message to call office to get scheduled with Dr Perdomo at next clinic day in [place].

## 2025-04-18 NOTE — TELEPHONE ENCOUNTER
The office received a medication request from [facility] for this patient. Her last appointment was on 03.26.2025; she is scheduled for a follow up on 04.01.2026. I did look in her

## 2025-04-21 RX ORDER — NORGESTIMATE AND ETHINYL ESTRADIOL 0.25-0.035
1 KIT ORAL DAILY
Qty: 28 TABLET | Refills: 12 | Status: SHIPPED | OUTPATIENT
Start: 2025-04-21

## 2025-04-22 RX ORDER — OMEPRAZOLE 20 MG/1
CAPSULE, DELAYED RELEASE ORAL
Qty: 90 CAPSULE | Refills: 1 | Status: SHIPPED | OUTPATIENT
Start: 2025-04-22

## 2025-04-24 ENCOUNTER — OFFICE VISIT (OUTPATIENT)
Dept: PULMONOLOGY | Age: 30
End: 2025-04-24
Payer: COMMERCIAL

## 2025-04-24 VITALS
HEIGHT: 62 IN | TEMPERATURE: 97 F | BODY MASS INDEX: 26.3 KG/M2 | RESPIRATION RATE: 20 BRPM | OXYGEN SATURATION: 98 % | WEIGHT: 142.9 LBS | SYSTOLIC BLOOD PRESSURE: 103 MMHG | DIASTOLIC BLOOD PRESSURE: 61 MMHG | HEART RATE: 78 BPM

## 2025-04-24 DIAGNOSIS — J45.50 SEVERE PERSISTENT ASTHMA WITHOUT COMPLICATION (HCC): Primary | ICD-10-CM

## 2025-04-24 PROCEDURE — 99214 OFFICE O/P EST MOD 30 MIN: CPT | Performed by: STUDENT IN AN ORGANIZED HEALTH CARE EDUCATION/TRAINING PROGRAM

## 2025-04-24 RX ORDER — AZITHROMYCIN 250 MG/1
TABLET, FILM COATED ORAL
Qty: 6 TABLET | Refills: 0 | Status: SHIPPED | OUTPATIENT
Start: 2025-04-24 | End: 2025-05-04

## 2025-04-24 RX ORDER — PREDNISONE 20 MG/1
20 TABLET ORAL 2 TIMES DAILY
Qty: 10 TABLET | Refills: 0 | Status: SHIPPED | OUTPATIENT
Start: 2025-04-24 | End: 2025-04-29

## 2025-04-24 NOTE — PATIENT INSTRUCTIONS
SURVEY:    Thank you for allowing us to care for you today.    You may be receiving a survey from [facility] regarding your visit today- electronically or via mail.      Please help us by completing the survey as this will provide the needed feedback to ensure we are providing the very best care for you and your family.    If you cannot score us a very good on any question, please call the office to discuss how we could have made your experience a very good one.    Thank you.       STAFF:    Ximena Murillo, Melany WAGNER      CLINICAL STAFF:    Krista OGDEN, Gemma WAGNER, Felicitas WAGNER, Maria M OGDEN

## 2025-04-25 NOTE — PROGRESS NOTES
[facility]  Office visit follow-up  ______________________________________________________________________________    Patient: Aurelia Sanders  YOB: 1995   MRN: [id number]    Acct: [id number]     Today's date: 04/24/25    Chief complaint   Follow up     History of present illness       A 29 y.o. female with uncontrolled severe asthma presented today to the pulmonary clinic for follow-up.   She was last time seen in the clinic on 12/12/2020 for  Her asthma symptoms started with COVID-19 pneumonia in last summer and continued forward with uncontrolled severe persistent asthma symptoms.   June 2024 CTA chest which showed no PE and no pneumonia.  July 2024 CT chest from 7/11/2024 showed tree-in-bud and focal pneumonia.  She was treated with slow tapering of prednisone and antibiotic.  Eosinophil count, IgE 9/5/2024 is within normal limit  Respiratory allergen panel all negative.    Interval history: 04/24/25  History of Present Illness  The patient presents for evaluation of asthma.    She was last seen in December, during which she experienced wheezing and multiple asthma attacks.   Despite normal eosinophil levels and negative allergy tests, a decision was made to initiate biologic injections.   However, her insurance has not approved this treatment.   She has not started any new treatments since December.   She consulted Dr. Granados due to a persistent cough, which resolved spontaneously after a week.   She has experienced 2 to 3 flare-ups since December but reports feeling well at present.   She is seeking an alternative treatment that can be approved by her insurance.   She is currently on albuterol as needed, Advair, Spiriva, and Singulair.   She also requests a prescription for prednisone to have on hand for potential future flare-ups.                   ROS:     Constitutional:no fever, no chills  HEENT: no runny nose, no sore throat  Respiratory: Intermittent

## 2025-05-13 ENCOUNTER — TELEPHONE (OUTPATIENT)
Dept: PRIMARY CARE CLINIC | Age: 30
End: 2025-05-13

## 2025-05-13 ENCOUNTER — OFFICE VISIT (OUTPATIENT)
Dept: PRIMARY CARE CLINIC | Age: 30
End: 2025-05-13
Payer: COMMERCIAL

## 2025-05-13 VITALS
SYSTOLIC BLOOD PRESSURE: 104 MMHG | OXYGEN SATURATION: 98 % | DIASTOLIC BLOOD PRESSURE: 68 MMHG | HEART RATE: 95 BPM | BODY MASS INDEX: 25.9 KG/M2 | WEIGHT: 141.6 LBS

## 2025-05-13 DIAGNOSIS — J10.1 INFLUENZA A: Primary | ICD-10-CM

## 2025-05-13 DIAGNOSIS — B37.0 THRUSH: ICD-10-CM

## 2025-05-13 LAB
INFLUENZA A ANTIGEN, POC: POSITIVE
INFLUENZA B ANTIGEN, POC: POSITIVE
LOT EXPIRE DATE: ABNORMAL
LOT KIT NUMBER: ABNORMAL
SARS-COV-2 RNA, POC: POSITIVE
VALID INTERNAL CONTROL: ABNORMAL
VENDOR AND KIT NAME POC: ABNORMAL

## 2025-05-13 PROCEDURE — 99214 OFFICE O/P EST MOD 30 MIN: CPT | Performed by: NURSE PRACTITIONER

## 2025-05-13 PROCEDURE — G2211 COMPLEX E/M VISIT ADD ON: HCPCS | Performed by: NURSE PRACTITIONER

## 2025-05-13 PROCEDURE — 87428 SARSCOV & INF VIR A&B AG IA: CPT | Performed by: NURSE PRACTITIONER

## 2025-05-13 ASSESSMENT — PATIENT HEALTH QUESTIONNAIRE - PHQ9
1. LITTLE INTEREST OR PLEASURE IN DOING THINGS: NOT AT ALL
SUM OF ALL RESPONSES TO PHQ QUESTIONS 1-9: 0
2. FEELING DOWN, DEPRESSED OR HOPELESS: NOT AT ALL

## 2025-05-13 ASSESSMENT — ENCOUNTER SYMPTOMS
SHORTNESS OF BREATH: 0
DIARRHEA: 0
SORE THROAT: 1
COUGH: 1
WHEEZING: 1
VOMITING: 0
NAUSEA: 0
SINUS PAIN: 0
VOICE CHANGE: 0
RHINORRHEA: 0
SINUS PRESSURE: 0
CHEST TIGHTNESS: 1

## 2025-05-13 NOTE — TELEPHONE ENCOUNTER
[facility] called to report that they no longer compound Magic Mouthwash, most likely the only area pharmacy that still does would be [facility]. Writer reached out to [facility] and they also no longer compound this medication.     Per [place] [facility] for Nystatin Mouthwash/Swish and Swallow, swish and swallow 10 mL QID, gave verbal ok to Kamille at [facility]

## 2025-05-13 NOTE — PROGRESS NOTES
ASSESSMENT/PLAN:  1. Influenza A  - Acute  - Increase fluids  - Use a humidifier during sleep  - AMB POC SARS-COV-2 AND INFLUENZA A/B    2. Thrush  - Acute  - Rinse mouth after advair inhaler.  - Magic Mouthwash (MIRACLE MOUTHWASH); Swish and swallow 10 mLs 4 times daily as needed for Irritation  Dispense: 400 mL; Refill: 0      Return if symptoms worsen or fail to improve.    Return precautions are reviewed extensively. If symptoms fail to improve or worsen rapidly, Aurelia is encouraged to report to an ED for prompt evaluation.    An electronic signature was used to authenticate this note.    --FAUSTINA Francois - CNP on 5/13/2025 at 3:32 PM

## 2025-05-19 ENCOUNTER — TELEPHONE (OUTPATIENT)
Dept: PULMONOLOGY | Age: 30
End: 2025-05-19

## 2025-05-19 NOTE — TELEPHONE ENCOUNTER
Writer received a call regarding patient's Dupixent prescription.  Nuha states she is an  helping with patient's claim.  It looks like PA was denied.    Please follow up and call Nuha at  [phone]

## 2025-05-22 NOTE — TELEPHONE ENCOUNTER
I called and informed Kaitlyn([facility]) that we received a message from Felicitas Estrada MA regarding Dupixent and calling Nuha an . She will call Nuha and let us know if we need to follow up with anything.

## 2025-05-27 RX ORDER — MONTELUKAST SODIUM 10 MG/1
10 TABLET ORAL NIGHTLY
Qty: 90 TABLET | Refills: 1 | Status: SHIPPED | OUTPATIENT
Start: 2025-05-27

## 2025-06-03 ENCOUNTER — OFFICE VISIT (OUTPATIENT)
Dept: PRIMARY CARE CLINIC | Age: 30
End: 2025-06-03
Payer: COMMERCIAL

## 2025-06-03 VITALS
HEART RATE: 71 BPM | DIASTOLIC BLOOD PRESSURE: 60 MMHG | OXYGEN SATURATION: 99 % | SYSTOLIC BLOOD PRESSURE: 110 MMHG | WEIGHT: 142 LBS | BODY MASS INDEX: 25.97 KG/M2 | TEMPERATURE: 96.4 F

## 2025-06-03 DIAGNOSIS — Z00.00 ANNUAL PHYSICAL EXAM: Primary | ICD-10-CM

## 2025-06-03 DIAGNOSIS — K21.9 GASTROESOPHAGEAL REFLUX DISEASE WITHOUT ESOPHAGITIS: ICD-10-CM

## 2025-06-03 DIAGNOSIS — J45.50 SEVERE PERSISTENT ASTHMA WITHOUT COMPLICATION (HCC): ICD-10-CM

## 2025-06-03 PROCEDURE — 99395 PREV VISIT EST AGE 18-39: CPT | Performed by: NURSE PRACTITIONER

## 2025-06-03 NOTE — PROGRESS NOTES
defined types were placed in this encounter.       No results found for this visit on 06/03/25.    Return in about 6 months (around 12/3/2025), or if symptoms worsen or fail to improve, for 6 month f/u .    Electronically signed by FAUSTINA Colón CNP on 06/03/25 at 4:52 PM.

## 2025-06-03 NOTE — PATIENT INSTRUCTIONS
Pepcid - over the counter - 20mg twice daily   Wean from omeprazole by going to every other day dosing x1 week, then every 2 day dosing x1 week, then every 3 day dosing x1 week, then discontinue

## 2025-06-19 ENCOUNTER — TELEPHONE (OUTPATIENT)
Dept: PULMONOLOGY | Age: 30
End: 2025-06-19

## 2025-06-19 NOTE — TELEPHONE ENCOUNTER
Patient's Dupixent has been approved. Does this medication need to be given in the hospital out patient department or can she self inject at home? Please advise. Thank you

## 2025-06-19 NOTE — TELEPHONE ENCOUNTER
I spoke with patient at length. Patient verbalized she will seek care at the emergency room as needed for any issues with the medication.

## 2025-06-23 ENCOUNTER — PATIENT MESSAGE (OUTPATIENT)
Dept: PRIMARY CARE CLINIC | Age: 30
End: 2025-06-23

## 2025-06-23 DIAGNOSIS — R05.1 ACUTE COUGH: ICD-10-CM

## 2025-06-23 DIAGNOSIS — J45.50 SEVERE PERSISTENT ASTHMA WITHOUT COMPLICATION (HCC): ICD-10-CM

## 2025-06-23 RX ORDER — ALBUTEROL SULFATE 0.83 MG/ML
SOLUTION RESPIRATORY (INHALATION)
Qty: 360 ML | Refills: 0 | Status: SHIPPED | OUTPATIENT
Start: 2025-06-23

## 2025-06-23 RX ORDER — FLUTICASONE PROPIONATE 50 MCG
2 SPRAY, SUSPENSION (ML) NASAL DAILY
Qty: 48 G | Refills: 1 | Status: SHIPPED | OUTPATIENT
Start: 2025-06-23

## 2025-06-26 ENCOUNTER — HOSPITAL ENCOUNTER (OUTPATIENT)
Age: 30
Setting detail: SPECIMEN
Discharge: HOME | End: 2025-06-26

## 2025-06-26 ENCOUNTER — HOSPITAL ENCOUNTER (OUTPATIENT)
Age: 30
Discharge: HOME | End: 2025-06-26
Payer: COMMERCIAL

## 2025-06-26 ENCOUNTER — OFFICE VISIT (OUTPATIENT)
Dept: PRIMARY CARE CLINIC | Age: 30
End: 2025-06-26
Payer: COMMERCIAL

## 2025-06-26 ENCOUNTER — RESULTS FOLLOW-UP (OUTPATIENT)
Dept: PRIMARY CARE CLINIC | Age: 30
End: 2025-06-26

## 2025-06-26 VITALS
SYSTOLIC BLOOD PRESSURE: 98 MMHG | BODY MASS INDEX: 25.83 KG/M2 | WEIGHT: 141.2 LBS | DIASTOLIC BLOOD PRESSURE: 78 MMHG | OXYGEN SATURATION: 98 % | HEART RATE: 86 BPM

## 2025-06-26 DIAGNOSIS — J45.51 SEVERE PERSISTENT ASTHMA WITH ACUTE EXACERBATION (HCC): Primary | ICD-10-CM

## 2025-06-26 DIAGNOSIS — B37.81 THRUSH OF MOUTH AND ESOPHAGUS (HCC): ICD-10-CM

## 2025-06-26 DIAGNOSIS — B37.0 THRUSH OF MOUTH AND ESOPHAGUS (HCC): ICD-10-CM

## 2025-06-26 LAB
ALBUMIN SERPL-MCNC: 4.3 G/DL (ref 3.5–5.2)
ALBUMIN/GLOB SERPL: 1.2 {RATIO} (ref 1–2.5)
ALP SERPL-CCNC: 70 U/L (ref 35–104)
ALT SERPL-CCNC: 25 U/L (ref 10–35)
ANION GAP SERPL CALCULATED.3IONS-SCNC: 12 MMOL/L (ref 9–16)
AST SERPL-CCNC: 22 U/L (ref 10–35)
BASOPHILS # BLD: 0.03 K/UL (ref 0–0.2)
BASOPHILS NFR BLD: 0 % (ref 0–2)
BILIRUB DIRECT SERPL-MCNC: <0.2 MG/DL (ref 0–0.3)
BILIRUB INDIRECT SERPL-MCNC: NORMAL MG/DL (ref 0–1)
BILIRUB SERPL-MCNC: <0.2 MG/DL (ref 0–1.2)
BUN SERPL-MCNC: 20 MG/DL (ref 6–20)
CALCIUM SERPL-MCNC: 9.2 MG/DL (ref 8.6–10.4)
CHLORIDE SERPL-SCNC: 102 MMOL/L (ref 98–107)
CO2 SERPL-SCNC: 24 MMOL/L (ref 20–31)
CREAT SERPL-MCNC: 0.8 MG/DL (ref 0.5–0.9)
EOSINOPHIL # BLD: 0.17 K/UL (ref 0–0.44)
EOSINOPHILS RELATIVE PERCENT: 2 % (ref 1–4)
ERYTHROCYTE [DISTWIDTH] IN BLOOD BY AUTOMATED COUNT: 12.2 % (ref 11.8–14.4)
GFR, ESTIMATED: >90 ML/MIN/1.73M2
GLUCOSE SERPL-MCNC: 93 MG/DL (ref 74–99)
HCT VFR BLD AUTO: 43.2 % (ref 36.3–47.1)
HGB BLD-MCNC: 14.1 G/DL (ref 11.9–15.1)
IMM GRANULOCYTES # BLD AUTO: <0.03 K/UL (ref 0–0.3)
IMM GRANULOCYTES NFR BLD: 0 %
LYMPHOCYTES NFR BLD: 2.88 K/UL (ref 1.1–3.7)
LYMPHOCYTES RELATIVE PERCENT: 38 % (ref 24–43)
MCH RBC QN AUTO: 31.5 PG (ref 25.2–33.5)
MCHC RBC AUTO-ENTMCNC: 32.6 G/DL (ref 28.4–34.8)
MCV RBC AUTO: 96.4 FL (ref 82.6–102.9)
MONOCYTES NFR BLD: 0.52 K/UL (ref 0.1–1.2)
MONOCYTES NFR BLD: 7 % (ref 3–12)
NEUTROPHILS NFR BLD: 53 % (ref 36–65)
NEUTS SEG NFR BLD: 3.9 K/UL (ref 1.5–8.1)
NRBC BLD-RTO: 0 PER 100 WBC
PLATELET # BLD AUTO: 313 K/UL (ref 138–453)
PMV BLD AUTO: 8 FL (ref 8.1–13.5)
POTASSIUM SERPL-SCNC: 4.2 MMOL/L (ref 3.7–5.3)
PROT SERPL-MCNC: 7.8 G/DL (ref 6.6–8.7)
RBC # BLD AUTO: 4.48 M/UL (ref 3.95–5.11)
SODIUM SERPL-SCNC: 138 MMOL/L (ref 136–145)
WBC OTHER # BLD: 7.5 K/UL (ref 3.5–11.3)

## 2025-06-26 PROCEDURE — G2211 COMPLEX E/M VISIT ADD ON: HCPCS | Performed by: NURSE PRACTITIONER

## 2025-06-26 PROCEDURE — 80053 COMPREHEN METABOLIC PANEL: CPT

## 2025-06-26 PROCEDURE — 99214 OFFICE O/P EST MOD 30 MIN: CPT | Performed by: NURSE PRACTITIONER

## 2025-06-26 PROCEDURE — 36415 COLL VENOUS BLD VENIPUNCTURE: CPT

## 2025-06-26 PROCEDURE — 85025 COMPLETE CBC W/AUTO DIFF WBC: CPT

## 2025-06-26 PROCEDURE — 82248 BILIRUBIN DIRECT: CPT

## 2025-06-26 RX ORDER — PREDNISONE 20 MG/1
TABLET ORAL
Qty: 15 TABLET | Refills: 0 | Status: SHIPPED | OUTPATIENT
Start: 2025-06-26 | End: 2025-07-06

## 2025-06-26 RX ORDER — FLUCONAZOLE 100 MG/1
200 TABLET ORAL DAILY
Qty: 16 TABLET | Refills: 0 | Status: SHIPPED | OUTPATIENT
Start: 2025-06-26 | End: 2025-07-04

## 2025-06-26 ASSESSMENT — ENCOUNTER SYMPTOMS
VOICE CHANGE: 0
RHINORRHEA: 0
CHEST TIGHTNESS: 1
DIARRHEA: 0
SINUS PRESSURE: 1
NAUSEA: 0
COUGH: 1
VOMITING: 0
SINUS PAIN: 0
SORE THROAT: 1
WHEEZING: 0
SHORTNESS OF BREATH: 1

## 2025-06-26 ASSESSMENT — PATIENT HEALTH QUESTIONNAIRE - PHQ9
SUM OF ALL RESPONSES TO PHQ QUESTIONS 1-9: 0
1. LITTLE INTEREST OR PLEASURE IN DOING THINGS: NOT AT ALL
SUM OF ALL RESPONSES TO PHQ QUESTIONS 1-9: 0
SUM OF ALL RESPONSES TO PHQ QUESTIONS 1-9: 0
2. FEELING DOWN, DEPRESSED OR HOPELESS: NOT AT ALL
SUM OF ALL RESPONSES TO PHQ QUESTIONS 1-9: 0

## 2025-06-26 NOTE — PATIENT INSTRUCTIONS
1. Severe persistent asthma with acute exacerbation (HCC)  - Acute exacerbation of chronic illness  - Start salt water gargles  - Continue inhalers as ordered  - predniSONE (DELTASONE) 20 MG tablet; Take 1 tablet by mouth 2 times daily for 5 days, THEN 1 tablet daily for 5 days.  Dispense: 15 tablet; Refill: 0    2. Thrush of mouth and esophagus (HCC)  - Recurrence  - fluconazole (DIFLUCAN) 100 MG tablet; Take 2 tablets by mouth daily for 8 days 4 tablets by mouth on day one and 2 tablets by mouth for the next 6 days.  Dispense: 16 tablet; Refill: 0  - CBC with Auto Differential; Future  - Comprehensive Metabolic Panel with Bilirubin; Future

## 2025-06-26 NOTE — PROGRESS NOTES
[facility]    2025     Aurelia Sanders (:  1995) is a 29 y.o. female, here for evaluation of the following medical concerns:  Chief Complaint:   Chief Complaint   Patient presents with    Cold Symptoms     Asthma and allergy flare up is what she feels like it is.  Chest congestion, nasal congestion, feels it in her tear ducts, raspy voice, cough, back sore from coughing and getting a headache.  Ongoing since last May or . These symptoms started about last weekend.  Doing inhalers and breathing treatments, humidifer     Asthma exacerbation  - Waking up coughing a couple times a night for the past week    Cold Symptoms   This is a recurrent problem. The current episode started in the past 7 days. The problem has been unchanged. There has been no fever. Associated symptoms include congestion, coughing and a sore throat. Pertinent negatives include no chest pain, diarrhea, ear pain, headaches, nausea, rhinorrhea, sinus pain, vomiting or wheezing.       Prior to Visit Medications    Medication Sig Taking? Authorizing Provider   fluconazole (DIFLUCAN) 100 MG tablet Take 2 tablets by mouth daily for 8 days 4 tablets by mouth on day one and 2 tablets by mouth for the next 6 days. Yes Louie Hall APRN - CNP   predniSONE (DELTASONE) 20 MG tablet Take 1 tablet by mouth 2 times daily for 5 days, THEN 1 tablet daily for 5 days. Yes Louie Hall APRN - CNP   albuterol (PROVENTIL) (2.5 MG/3ML) 0.083% nebulizer solution USE 1 VIAL IN NEBULIZER EVERY 4 HOURS AS NEEDED FOR WHEEZING OR  SHORTNESS  OF  BREATH Yes Madelaine Fonseca APRN - CNP   fluticasone (FLONASE) 50 MCG/ACT nasal spray 2 sprays by Each Nostril route daily Yes Madelaine Fonseca APRN - CNP   montelukast (SINGULAIR) 10 MG tablet Take 1 tablet by mouth nightly Yes Madelaine Fonseca APRN - CNP   omeprazole (PRILOSEC) 20 MG delayed release capsule TAKE 1 CAPSULE BY MOUTH ONCE DAILY IN THE MORNING BEFORE BREAKFAST Yes Raymundo

## 2025-07-01 ENCOUNTER — TELEPHONE (OUTPATIENT)
Dept: PULMONOLOGY | Age: 30
End: 2025-07-01

## 2025-07-01 DIAGNOSIS — J45.50 SEVERE PERSISTENT ASTHMA WITHOUT COMPLICATION (HCC): Primary | ICD-10-CM

## 2025-07-01 NOTE — TELEPHONE ENCOUNTER
Don, pharmacist with [facility] services stating they have the Dupixent loading dose order 600mg but not a maintenance doses to follow of 300mg every other week.    Could order be sent to [facility] for the maintenance doses?

## 2025-07-08 NOTE — TELEPHONE ENCOUNTER
[facility] called to check on the status of this. I reloaded the maintenance dose and pended [facility] pharmacy. Please review.   no fever and no chills.

## 2025-07-21 ENCOUNTER — TELEPHONE (OUTPATIENT)
Dept: PULMONOLOGY | Age: 30
End: 2025-07-21

## 2025-07-21 NOTE — TELEPHONE ENCOUNTER
Message left from Martha at [employer] asking if we received a patient assistance program application via fax. I called her back and left a message to return my call. Upon looking no form found at this time.

## 2025-08-04 DIAGNOSIS — B00.1 COLD SORE: ICD-10-CM

## 2025-08-04 RX ORDER — VALACYCLOVIR HYDROCHLORIDE 1 G/1
TABLET, FILM COATED ORAL
Qty: 4 TABLET | Refills: 4 | Status: SHIPPED | OUTPATIENT
Start: 2025-08-04

## 2025-08-04 RX ORDER — CETIRIZINE HYDROCHLORIDE 10 MG/1
TABLET, FILM COATED ORAL
Qty: 90 TABLET | Refills: 3 | Status: SHIPPED | OUTPATIENT
Start: 2025-08-04

## 2025-08-09 DIAGNOSIS — J45.50 SEVERE PERSISTENT ASTHMA WITHOUT COMPLICATION (HCC): ICD-10-CM

## 2025-08-11 RX ORDER — FLUTICASONE PROPIONATE AND SALMETEROL 500; 50 UG/1; UG/1
POWDER RESPIRATORY (INHALATION)
Qty: 60 EACH | Refills: 0 | Status: SHIPPED | OUTPATIENT
Start: 2025-08-11

## 2025-08-19 ENCOUNTER — OFFICE VISIT (OUTPATIENT)
Dept: PRIMARY CARE CLINIC | Age: 30
End: 2025-08-19
Payer: COMMERCIAL

## 2025-08-19 ENCOUNTER — HOSPITAL ENCOUNTER (OUTPATIENT)
Dept: LAB | Age: 30
Discharge: HOME | End: 2025-08-19
Payer: COMMERCIAL

## 2025-08-19 VITALS
WEIGHT: 145 LBS | TEMPERATURE: 97.4 F | HEART RATE: 88 BPM | DIASTOLIC BLOOD PRESSURE: 62 MMHG | BODY MASS INDEX: 26.52 KG/M2 | OXYGEN SATURATION: 98 % | SYSTOLIC BLOOD PRESSURE: 98 MMHG

## 2025-08-19 DIAGNOSIS — R25.2 MUSCLE CRAMPING: ICD-10-CM

## 2025-08-19 DIAGNOSIS — Z00.00 WELLNESS EXAMINATION: ICD-10-CM

## 2025-08-19 DIAGNOSIS — R25.2 MUSCLE CRAMPING: Primary | ICD-10-CM

## 2025-08-19 LAB
ALBUMIN SERPL-MCNC: 4.1 G/DL (ref 3.5–5.2)
ALBUMIN/GLOB SERPL: 1.6 {RATIO} (ref 1–2.5)
ALP SERPL-CCNC: 50 U/L (ref 35–104)
ALT SERPL-CCNC: 27 U/L (ref 10–35)
ANION GAP SERPL CALCULATED.3IONS-SCNC: 11 MMOL/L (ref 9–16)
AST SERPL-CCNC: 24 U/L (ref 10–35)
BILIRUB SERPL-MCNC: 0.3 MG/DL (ref 0–1.2)
BUN SERPL-MCNC: 18 MG/DL (ref 6–20)
BUN/CREAT SERPL: 18 (ref 9–20)
CALCIUM SERPL-MCNC: 9 MG/DL (ref 8.6–10.4)
CHLORIDE SERPL-SCNC: 102 MMOL/L (ref 98–107)
CHOLEST SERPL-MCNC: 202 MG/DL (ref 0–199)
CHOLESTEROL/HDL RATIO: 3
CO2 SERPL-SCNC: 24 MMOL/L (ref 20–31)
CREAT SERPL-MCNC: 1 MG/DL (ref 0.5–0.9)
ERYTHROCYTE [DISTWIDTH] IN BLOOD BY AUTOMATED COUNT: 12.8 % (ref 11.8–14.4)
FERRITIN SERPL-MCNC: 78 NG/ML (ref 15–150)
GFR, ESTIMATED: 81 ML/MIN/1.73M2
GLUCOSE SERPL-MCNC: 93 MG/DL (ref 74–99)
HCT VFR BLD AUTO: 42.5 % (ref 36.3–47.1)
HDLC SERPL-MCNC: 67 MG/DL
HGB BLD-MCNC: 14.1 G/DL (ref 11.9–15.1)
IRON SATN MFR SERPL: 44 % (ref 20–55)
IRON SERPL-MCNC: 151 UG/DL (ref 37–145)
LDLC SERPL CALC-MCNC: 119 MG/DL (ref 0–100)
MAGNESIUM SERPL-MCNC: 1.8 MG/DL (ref 1.6–2.6)
MCH RBC QN AUTO: 31.7 PG (ref 25.2–33.5)
MCHC RBC AUTO-ENTMCNC: 33.2 G/DL (ref 28.4–34.8)
MCV RBC AUTO: 95.5 FL (ref 82.6–102.9)
NRBC BLD-RTO: 0 PER 100 WBC
PLATELET # BLD AUTO: 274 K/UL (ref 138–453)
PMV BLD AUTO: 8.4 FL (ref 8.1–13.5)
POTASSIUM SERPL-SCNC: 4 MMOL/L (ref 3.7–5.3)
PROT SERPL-MCNC: 6.8 G/DL (ref 6.6–8.7)
RBC # BLD AUTO: 4.45 M/UL (ref 3.95–5.11)
SODIUM SERPL-SCNC: 137 MMOL/L (ref 136–145)
TIBC SERPL-MCNC: 344 UG/DL (ref 250–450)
TRIGL SERPL-MCNC: 80 MG/DL
TSH SERPL DL<=0.05 MIU/L-ACNC: 2.3 UIU/ML (ref 0.27–4.2)
UNSATURATED IRON BINDING CAPACITY: 193 UG/DL (ref 112–347)
VLDLC SERPL CALC-MCNC: 16 MG/DL (ref 1–30)
WBC OTHER # BLD: 6.9 K/UL (ref 3.5–11.3)

## 2025-08-19 PROCEDURE — G2211 COMPLEX E/M VISIT ADD ON: HCPCS | Performed by: NURSE PRACTITIONER

## 2025-08-19 PROCEDURE — 36415 COLL VENOUS BLD VENIPUNCTURE: CPT

## 2025-08-19 PROCEDURE — 83735 ASSAY OF MAGNESIUM: CPT

## 2025-08-19 PROCEDURE — 80053 COMPREHEN METABOLIC PANEL: CPT

## 2025-08-19 PROCEDURE — 83550 IRON BINDING TEST: CPT

## 2025-08-19 PROCEDURE — 82728 ASSAY OF FERRITIN: CPT

## 2025-08-19 PROCEDURE — 80061 LIPID PANEL: CPT

## 2025-08-19 PROCEDURE — 83540 ASSAY OF IRON: CPT

## 2025-08-19 PROCEDURE — 84443 ASSAY THYROID STIM HORMONE: CPT

## 2025-08-19 PROCEDURE — 85027 COMPLETE CBC AUTOMATED: CPT

## 2025-08-19 PROCEDURE — 99213 OFFICE O/P EST LOW 20 MIN: CPT | Performed by: NURSE PRACTITIONER

## (undated) DEVICE — UNDERPANTS INCONT XL 45-70IN KNIT SEAMLESS DSGN COLOR-CODED

## (undated) DEVICE — COUNTER NDL 40 COUNT HLD 70 FOAM BLK ADH W/ MAG

## (undated) DEVICE — MARKER,SKIN,WI/RULER AND LABELS: Brand: MEDLINE

## (undated) DEVICE — GAUZE,SPONGE,4"X4",16PLY,XRAY,STRL,LF: Brand: MEDLINE

## (undated) DEVICE — ELECTRODE ES L11CM DIA5MM BALL SHFT RED DISP UTAHLOOP

## (undated) DEVICE — SOLUTION SURG PREP ANTIMICROBIAL 4 OZ SKIN WND EXIDINE

## (undated) DEVICE — SOLUTION IV IRRIG POUR BRL 0.9% SODIUM CHL 2F7124

## (undated) DEVICE — PACK,LITHOTOMY: Brand: MEDLINE

## (undated) DEVICE — ELECTRODE ARTHSCP W10MM D10MM SHFT 11CM YEL MPLR LOOP W/

## (undated) DEVICE — TOWEL,OR,DSP,ST,BLUE,STD,4/PK,20PK/CS: Brand: MEDLINE

## (undated) DEVICE — PAD N ADH W3XL4IN POLY COT SFT PERF FLM EASILY CUT ABSRB

## (undated) DEVICE — ELECTRODE PT RET AD L9FT HI MOIST COND ADH HYDRGEL CORDED

## (undated) DEVICE — BLADE ES L6IN ELASTOMERIC COAT INSUL DURABLE BEND UPTO

## (undated) DEVICE — COVER LT HNDL BLU PLAS

## (undated) DEVICE — PENCIL ES L3M BTTN SWCH HOLSTER W/ BLDE ELECTRD EDGE

## (undated) DEVICE — TUBING, SUCTION, 9/32" X 12', STRAIGHT: Brand: MEDLINE INDUSTRIES, INC.

## (undated) DEVICE — Z DISCONTINUED BY MEDLINE USE 2711682 TRAY SKIN PREP DRY W/ PREM GLV

## (undated) DEVICE — SYRINGE MED 10ML TRNSLUC BRL PLUNG BLK MRK POLYPR CTRL

## (undated) DEVICE — PREP PAD BNS: Brand: CONVERTORS

## (undated) DEVICE — GOWN,SIRUS,POLYRNF,BRTHSLV,XL,30/CS: Brand: MEDLINE

## (undated) DEVICE — NEEDLE SPNL 20GA L3.5IN YEL HUB S STL REG WALL FIT STYL W/

## (undated) DEVICE — CATHETER URETH 16FR L16IN RED RUB INTMIT ROB MOD BARDX

## (undated) DEVICE — PAD,ABDOMINAL,8"X10",ST,LF: Brand: MEDLINE

## (undated) DEVICE — GLOVE SURG SZ 65 L12IN FNGR THK87MIL WHT LTX FREE

## (undated) DEVICE — SUTURE PERMA-HAND SZ 2-0 L30IN NONABSORBABLE BLK L26MM SH K833H